# Patient Record
Sex: MALE | Race: WHITE | Employment: OTHER | URBAN - NONMETROPOLITAN AREA
[De-identification: names, ages, dates, MRNs, and addresses within clinical notes are randomized per-mention and may not be internally consistent; named-entity substitution may affect disease eponyms.]

---

## 2017-02-28 ENCOUNTER — OFFICE VISIT (OUTPATIENT)
Dept: PULMONOLOGY | Age: 82
End: 2017-02-28

## 2017-02-28 VITALS
BODY MASS INDEX: 25.04 KG/M2 | DIASTOLIC BLOOD PRESSURE: 70 MMHG | WEIGHT: 165.2 LBS | SYSTOLIC BLOOD PRESSURE: 118 MMHG | OXYGEN SATURATION: 97 % | HEIGHT: 68 IN | HEART RATE: 61 BPM

## 2017-02-28 DIAGNOSIS — R09.82 PND (POST-NASAL DRIP): ICD-10-CM

## 2017-02-28 DIAGNOSIS — R06.02 SOB (SHORTNESS OF BREATH): ICD-10-CM

## 2017-02-28 DIAGNOSIS — R06.3 CENTRAL SLEEP APNEA DUE TO CHEYNE-STOKES RESPIRATION: Primary | ICD-10-CM

## 2017-02-28 DIAGNOSIS — R05.9 COUGH: ICD-10-CM

## 2017-02-28 PROCEDURE — 1123F ACP DISCUSS/DSCN MKR DOCD: CPT | Performed by: PHYSICIAN ASSISTANT

## 2017-02-28 PROCEDURE — 4040F PNEUMOC VAC/ADMIN/RCVD: CPT | Performed by: PHYSICIAN ASSISTANT

## 2017-02-28 PROCEDURE — G8427 DOCREV CUR MEDS BY ELIG CLIN: HCPCS | Performed by: PHYSICIAN ASSISTANT

## 2017-02-28 PROCEDURE — 1036F TOBACCO NON-USER: CPT | Performed by: PHYSICIAN ASSISTANT

## 2017-02-28 PROCEDURE — G8484 FLU IMMUNIZE NO ADMIN: HCPCS | Performed by: PHYSICIAN ASSISTANT

## 2017-02-28 PROCEDURE — 99213 OFFICE O/P EST LOW 20 MIN: CPT | Performed by: PHYSICIAN ASSISTANT

## 2017-02-28 PROCEDURE — G8420 CALC BMI NORM PARAMETERS: HCPCS | Performed by: PHYSICIAN ASSISTANT

## 2017-02-28 ASSESSMENT — ENCOUNTER SYMPTOMS
NAUSEA: 0
SHORTNESS OF BREATH: 0
WHEEZING: 0
GASTROINTESTINAL NEGATIVE: 1
COUGH: 1
SPUTUM PRODUCTION: 1
HEARTBURN: 0
VOMITING: 0
BACK PAIN: 0
HEMOPTYSIS: 0
EYES NEGATIVE: 1
SORE THROAT: 0

## 2017-05-30 ENCOUNTER — OFFICE VISIT (OUTPATIENT)
Dept: PULMONOLOGY | Age: 82
End: 2017-05-30

## 2017-05-30 VITALS
HEIGHT: 68 IN | HEART RATE: 78 BPM | SYSTOLIC BLOOD PRESSURE: 130 MMHG | DIASTOLIC BLOOD PRESSURE: 88 MMHG | BODY MASS INDEX: 25.82 KG/M2 | OXYGEN SATURATION: 98 % | WEIGHT: 170.4 LBS

## 2017-05-30 DIAGNOSIS — R06.3 CENTRAL SLEEP APNEA DUE TO CHEYNE-STOKES RESPIRATION: Primary | ICD-10-CM

## 2017-05-30 DIAGNOSIS — R06.02 SOB (SHORTNESS OF BREATH): ICD-10-CM

## 2017-05-30 DIAGNOSIS — M41.9 KYPHOSCOLIOSIS: ICD-10-CM

## 2017-05-30 PROCEDURE — 4040F PNEUMOC VAC/ADMIN/RCVD: CPT | Performed by: PHYSICIAN ASSISTANT

## 2017-05-30 PROCEDURE — 1036F TOBACCO NON-USER: CPT | Performed by: PHYSICIAN ASSISTANT

## 2017-05-30 PROCEDURE — 1123F ACP DISCUSS/DSCN MKR DOCD: CPT | Performed by: PHYSICIAN ASSISTANT

## 2017-05-30 PROCEDURE — G8420 CALC BMI NORM PARAMETERS: HCPCS | Performed by: PHYSICIAN ASSISTANT

## 2017-05-30 PROCEDURE — 99214 OFFICE O/P EST MOD 30 MIN: CPT | Performed by: PHYSICIAN ASSISTANT

## 2017-05-30 PROCEDURE — G8427 DOCREV CUR MEDS BY ELIG CLIN: HCPCS | Performed by: PHYSICIAN ASSISTANT

## 2017-07-07 RX ORDER — FINASTERIDE 5 MG/1
TABLET, FILM COATED ORAL
Qty: 90 TABLET | Refills: 0 | Status: SHIPPED | OUTPATIENT
Start: 2017-07-07 | End: 2017-11-21 | Stop reason: ALTCHOICE

## 2017-10-03 RX ORDER — FINASTERIDE 5 MG/1
5 TABLET, FILM COATED ORAL DAILY
Qty: 90 TABLET | Refills: 3 | Status: SHIPPED | OUTPATIENT
Start: 2017-10-03 | End: 2017-11-21 | Stop reason: SDUPTHER

## 2017-10-03 NOTE — TELEPHONE ENCOUNTER
Minus Guillermina called requesting a refill on the following medications:  Requested Prescriptions     Pending Prescriptions Disp Refills    finasteride (PROSCAR) 5 MG tablet 90 tablet 3     Sig: Take 1 tablet by mouth daily     Pharmacy verified:  [email or web address]      Date of last visit: 11/17/16  Date of next visit (if applicable): 75/49/0682        Date of last fill and quantity (to be completed by clinical staff)  Pharmacy name: [facility] on [address]

## 2017-11-21 ENCOUNTER — OFFICE VISIT (OUTPATIENT)
Dept: UROLOGY | Age: 82
End: 2017-11-21
Payer: MEDICARE

## 2017-11-21 VITALS
BODY MASS INDEX: 25.31 KG/M2 | SYSTOLIC BLOOD PRESSURE: 100 MMHG | DIASTOLIC BLOOD PRESSURE: 60 MMHG | HEIGHT: 69 IN | WEIGHT: 170.9 LBS

## 2017-11-21 DIAGNOSIS — R35.1 NOCTURIA: ICD-10-CM

## 2017-11-21 DIAGNOSIS — R10.9 FLANK PAIN: ICD-10-CM

## 2017-11-21 DIAGNOSIS — R35.0 BENIGN PROSTATIC HYPERPLASIA WITH URINARY FREQUENCY: Primary | ICD-10-CM

## 2017-11-21 DIAGNOSIS — N40.1 BENIGN PROSTATIC HYPERPLASIA WITH URINARY FREQUENCY: Primary | ICD-10-CM

## 2017-11-21 LAB
BILIRUBIN URINE: NEGATIVE
BLOOD URINE, POC: NORMAL
CHARACTER, URINE: CLEAR
COLOR, URINE: YELLOW
GLUCOSE URINE: NEGATIVE MG/DL
KETONES, URINE: NORMAL
LEUKOCYTE CLUMPS, URINE: NEGATIVE
NITRITE, URINE: NEGATIVE
PH, URINE: 5.5
PROTEIN, URINE: NEGATIVE MG/DL
SPECIFIC GRAVITY, URINE: 1.01 (ref 1–1.03)
UROBILINOGEN, URINE: 1 EU/DL

## 2017-11-21 PROCEDURE — 99213 OFFICE O/P EST LOW 20 MIN: CPT | Performed by: UROLOGY

## 2017-11-21 PROCEDURE — G8484 FLU IMMUNIZE NO ADMIN: HCPCS | Performed by: UROLOGY

## 2017-11-21 PROCEDURE — 4040F PNEUMOC VAC/ADMIN/RCVD: CPT | Performed by: UROLOGY

## 2017-11-21 PROCEDURE — 1036F TOBACCO NON-USER: CPT | Performed by: UROLOGY

## 2017-11-21 PROCEDURE — G8428 CUR MEDS NOT DOCUMENT: HCPCS | Performed by: UROLOGY

## 2017-11-21 PROCEDURE — 1123F ACP DISCUSS/DSCN MKR DOCD: CPT | Performed by: UROLOGY

## 2017-11-21 PROCEDURE — G8417 CALC BMI ABV UP PARAM F/U: HCPCS | Performed by: UROLOGY

## 2017-11-21 RX ORDER — TAMSULOSIN HYDROCHLORIDE 0.4 MG/1
0.4 CAPSULE ORAL NIGHTLY
Qty: 90 CAPSULE | Refills: 3 | Status: SHIPPED | OUTPATIENT
Start: 2017-11-21 | End: 2018-11-06 | Stop reason: SDUPTHER

## 2017-11-21 RX ORDER — LIDOCAINE 50 MG/G
OINTMENT TOPICAL PRN
COMMUNITY
End: 2019-01-21 | Stop reason: SDUPTHER

## 2017-11-21 RX ORDER — POTASSIUM CHLORIDE 1.5 G/1.77G
20 POWDER, FOR SOLUTION ORAL DAILY
COMMUNITY

## 2017-11-21 RX ORDER — FUROSEMIDE 20 MG/1
40 TABLET ORAL DAILY
COMMUNITY

## 2017-11-21 RX ORDER — FINASTERIDE 5 MG/1
5 TABLET, FILM COATED ORAL DAILY
Qty: 90 TABLET | Refills: 3 | Status: SHIPPED | OUTPATIENT
Start: 2017-11-21 | End: 2018-10-15 | Stop reason: SDUPTHER

## 2017-11-22 ENCOUNTER — HOSPITAL ENCOUNTER (OUTPATIENT)
Dept: ULTRASOUND IMAGING | Age: 82
Discharge: HOME | End: 2017-11-22
Payer: MEDICARE

## 2017-11-22 ENCOUNTER — TELEPHONE (OUTPATIENT)
Dept: UROLOGY | Age: 82
End: 2017-11-22

## 2017-11-22 DIAGNOSIS — R10.9 FLANK PAIN: ICD-10-CM

## 2017-11-22 PROCEDURE — 76770 US EXAM ABDO BACK WALL COMP: CPT

## 2017-11-28 ENCOUNTER — OFFICE VISIT (OUTPATIENT)
Dept: PULMONOLOGY | Age: 82
End: 2017-11-28
Payer: MEDICARE

## 2017-11-28 VITALS
HEART RATE: 60 BPM | DIASTOLIC BLOOD PRESSURE: 64 MMHG | WEIGHT: 169.6 LBS | SYSTOLIC BLOOD PRESSURE: 122 MMHG | BODY MASS INDEX: 25.12 KG/M2 | HEIGHT: 69 IN | OXYGEN SATURATION: 94 %

## 2017-11-28 DIAGNOSIS — R05.3 CHRONIC COUGH: ICD-10-CM

## 2017-11-28 DIAGNOSIS — R06.3 CENTRAL SLEEP APNEA DUE TO CHEYNE-STOKES RESPIRATION: Primary | ICD-10-CM

## 2017-11-28 DIAGNOSIS — J42 CHRONIC BRONCHITIS, UNSPECIFIED CHRONIC BRONCHITIS TYPE (HCC): ICD-10-CM

## 2017-11-28 DIAGNOSIS — I27.20 PULMONARY HYPERTENSION (HCC): ICD-10-CM

## 2017-11-28 DIAGNOSIS — R09.82 PND (POST-NASAL DRIP): ICD-10-CM

## 2017-11-28 DIAGNOSIS — J98.4 RESTRICTIVE LUNG DISEASE DUE TO KYPHOSCOLIOSIS: ICD-10-CM

## 2017-11-28 DIAGNOSIS — Z98.890 H/O MITRAL VALVE REPAIR: ICD-10-CM

## 2017-11-28 DIAGNOSIS — M41.9 RESTRICTIVE LUNG DISEASE DUE TO KYPHOSCOLIOSIS: ICD-10-CM

## 2017-11-28 DIAGNOSIS — I48.20 CHRONIC ATRIAL FIBRILLATION (HCC): ICD-10-CM

## 2017-11-28 DIAGNOSIS — R06.09 DYSPNEA ON EXERTION: ICD-10-CM

## 2017-11-28 PROCEDURE — G8417 CALC BMI ABV UP PARAM F/U: HCPCS | Performed by: PHYSICIAN ASSISTANT

## 2017-11-28 PROCEDURE — 99215 OFFICE O/P EST HI 40 MIN: CPT | Performed by: PHYSICIAN ASSISTANT

## 2017-11-28 PROCEDURE — G8427 DOCREV CUR MEDS BY ELIG CLIN: HCPCS | Performed by: PHYSICIAN ASSISTANT

## 2017-11-28 PROCEDURE — 1036F TOBACCO NON-USER: CPT | Performed by: PHYSICIAN ASSISTANT

## 2017-11-28 PROCEDURE — 3023F SPIROM DOC REV: CPT | Performed by: PHYSICIAN ASSISTANT

## 2017-11-28 PROCEDURE — 1123F ACP DISCUSS/DSCN MKR DOCD: CPT | Performed by: PHYSICIAN ASSISTANT

## 2017-11-28 PROCEDURE — 4040F PNEUMOC VAC/ADMIN/RCVD: CPT | Performed by: PHYSICIAN ASSISTANT

## 2017-11-28 PROCEDURE — G8926 SPIRO NO PERF OR DOC: HCPCS | Performed by: PHYSICIAN ASSISTANT

## 2017-11-28 PROCEDURE — G8484 FLU IMMUNIZE NO ADMIN: HCPCS | Performed by: PHYSICIAN ASSISTANT

## 2017-11-28 RX ORDER — FLUTICASONE PROPIONATE 50 MCG
2 SPRAY, SUSPENSION (ML) NASAL DAILY
Qty: 1 BOTTLE | Refills: 6 | Status: SHIPPED | OUTPATIENT
Start: 2017-11-28 | End: 2019-03-12

## 2017-11-28 RX ORDER — ALBUTEROL SULFATE 90 UG/1
2 AEROSOL, METERED RESPIRATORY (INHALATION) EVERY 6 HOURS PRN
Qty: 1 INHALER | Refills: 11 | Status: SHIPPED | OUTPATIENT
Start: 2017-11-28 | End: 2020-07-14 | Stop reason: SDUPTHER

## 2017-11-28 NOTE — PROGRESS NOTES
[facility]      Naida Deras                                         [id number]  11/28/2017   Chief Complaint   Patient presents with    Sleep Apnea     6 month fu with download        Pt of Dr. Gokul Lara    PAP Download:   Original or initial  AHI: 22.3     Date of initial study: 8/12/13    [] Compliant  60%   [x]  Noncompliant 40 %       PAP Type - ASV  Max pressure- 25  Min EPAP 4   Max EPAP 15  Min PS 4  Max PS 15  Avg Hrs/Day 5:5:32  AHI: 7.7   Recorded compliance dates 10/29/17-11/27/17  Machine/Mfg: Respironics Interface: Full face mask    Provider:  [x]-HMAZALEA  []Randall  []Jerod  []Javan         [][facility] []Other:   Neck Size: 17.5in. Mallampati 4  ESS:  8      Presentation:   Mary Lou Peguero presents for sleep medicine follow up for central sleep apnea  Since the last visit, Mary Lou Peguero is doing reasonably well with their sleep machine. Other comments: he had stopped using it for several months and now using it again over the past few weeks. He is still having dyspnea with exertion. Following with cardiology for cardiac issues. Had LE edema but improved with diuretics. He is using albuterol as needed for dyspnea with some benefit -1-2 times a day. He is coughing- chronic and productive of yellow sputum. He complains of PND,    Equipment issues: The pressure is  acceptable, the mask is acceptable and he  is  using the humidity. Sleep issues:  Do you feel better? Yes  More rested? Yes   Better concentration? yes    Progress History:   Since last visit any new medical issues? Yes SOB  New ER or hospitlal visits? No  Any new or changes in medicines? No  Any new sleep medicines?  No        Past Medical History:   Diagnosis Date    Atrial fibrillation ([address]     CAD (coronary artery disease)     mitral valve repair only    Chronic kidney disease     Diabetes mellitus ([address]     Hypertension 8/20/2013    Mild acid reflux 8/20/2013    Pancreatitis, gallstone     Sleep apnea        Past Surgical History:   Procedure Laterality Date    CARDIAC SURGERY      mitral valve repair    CATARACT REMOVAL      CHOLECYSTECTOMY      COLONOSCOPY  01/12/2011,10/09/2015    ENDOSCOPY, COLON, DIAGNOSTIC      HERNIA REPAIR      PACEMAKER PLACEMENT  3/23/15    SKIN BIOPSY      negative    TONSILLECTOMY AND ADENOIDECTOMY         Social History   Substance Use Topics    Smoking status: Never Smoker    Smokeless tobacco: Never Used      Comment: never    Alcohol use 0.0 oz/week     1 Glasses of wine per week      Comment: very little. right now none. No Known Allergies    Current Outpatient Prescriptions   Medication Sig Dispense Refill    CPAP Machine MISC by Does not apply route Please change ASV to  Max pressure- 25  Min EPAP 5  Max EPAP 15  Min PS 4  Max PS 15 1 each 0    albuterol sulfate HFA (PROAIR HFA) 108 (90 Base) MCG/ACT inhaler Inhale 2 puffs into the lungs every 6 hours as needed for Wheezing 1 Inhaler 11    fluticasone (FLONASE) 50 MCG/ACT nasal spray 2 sprays by Nasal route daily 1 Bottle 6    Cetirizine HCl 10 MG CAPS Take 1 capsule by mouth daily 30 capsule 3    furosemide (LASIX) 20 MG tablet Take 20 mg by mouth 2 times daily      potassium chloride (KLOR-CON) 20 MEQ packet Take 20 mEq by mouth 2 times daily      Sacubitril-Valsartan (ENTRESTO PO) Take by mouth      Omega-3 Fatty Acids (FISH OIL CONCENTRATE PO) Take by mouth      lidocaine (XYLOCAINE) 5 % ointment Apply topically as needed for Pain Apply topically as needed.  hydrocortisone (WESTCORT) 0.2 % cream Apply topically 2 times daily Apply topically 2 times daily.       finasteride (PROSCAR) 5 MG tablet Take 1 tablet by mouth daily 90 tablet 3    tamsulosin (FLOMAX) 0.4 MG capsule Take 1 capsule by mouth nightly 90 capsule 3    Buprenorphine (BUTRANS TD) Place 7.5 mg onto the skin 7 days a week      amiodarone (CORDARONE) 200 MG tablet Take 100 mg by mouth daily      Calcium Citrate-Vitamin D (CALCIUM CITRATE +D PO) Take  by mouth daily. 630 mg - 500 IU      hydrocortisone 0.5 % cream Apply  topically daily as needed. Apply topically 2 times daily.  METFORMIN HCL ER, OSM, PO Take 500 mg by mouth 2 times daily.  Cholecalciferol (VITAMIN D3) 2000 UNITS CAPS Take  by mouth daily.  Carvedilol (COREG PO) Take 6.25 mg by mouth 2 times daily       aspirin 81 MG EC tablet Take 162 mg by mouth daily.  Warfarin Sodium (COUMADIN PO) Take by mouth Followed by Dr. Tess Franklin. 2.5 mg daily.  omeprazole (PRILOSEC) 20 MG capsule Take 20 mg by mouth daily.  multivitamin (THERAGRAN) per tablet Take 1 tablet by mouth daily.  Ascorbic Acid (VITAMIN C) 500 MG tablet Take 1,000 mg by mouth daily.  vitamin B-12 (CYANOCOBALAMIN) 100 MCG tablet Take 500 mcg by mouth daily. No current facility-administered medications for this visit. Family History   Problem Relation Age of Onset    Diabetes Mother     Cancer Father         Review of Systems -   General ROS: stable / unchanged  ENT ROS: +nasal congestion, oral lesions or sore throat  Hematological and Lymphatic ROS: negative  Endocrine ROS: negative  Respiratory ROS: +cough, shortness of breath,   Cardiovascular ROS: no chest pain   Gastrointestinal ROS: no abdominal pain, change in bowel habits, or black or bloody stools  Musculoskeletal ROS: negative  Neurological ROS: negative    Physical Exam:    BMI:  Body mass index is 25.05 kg/m².     Wt Readings from Last 3 Encounters:   11/28/17 169 lb 9.6 oz (76.9 kg)   11/21/17 170 lb 14.4 oz (77.5 kg)   05/30/17 170 lb 6.4 oz (77.3 kg)     Vitals: /64 (Site: Left Arm, Position: Sitting, Cuff Size: Large Adult)   Pulse 60   Ht 5' 9\" (1.753 m)   Wt 169 lb 9.6 oz (76.9 kg)   SpO2 94% Comment: RA at rest  BMI 25.05 kg/m²       General appearance: alert and oriented to person, place and time, well-developed and well-nourished, in no acute impression and plan. Patient verbalizes understanding.   We will see Luis Wong back in: 3 months for pulm and 6 months with download    José Arias PA-C, MPAS  11/28/2017

## 2018-03-02 ENCOUNTER — OFFICE VISIT (OUTPATIENT)
Dept: PULMONOLOGY | Age: 83
End: 2018-03-02
Payer: MEDICARE

## 2018-03-02 VITALS
BODY MASS INDEX: 25.86 KG/M2 | SYSTOLIC BLOOD PRESSURE: 126 MMHG | TEMPERATURE: 99.2 F | RESPIRATION RATE: 18 BRPM | HEART RATE: 60 BPM | DIASTOLIC BLOOD PRESSURE: 80 MMHG | OXYGEN SATURATION: 97 % | WEIGHT: 174.6 LBS | HEIGHT: 69 IN

## 2018-03-02 DIAGNOSIS — R05.3 CHRONIC COUGH: ICD-10-CM

## 2018-03-02 DIAGNOSIS — J98.4 RESTRICTIVE LUNG DISEASE DUE TO KYPHOSCOLIOSIS: Primary | ICD-10-CM

## 2018-03-02 DIAGNOSIS — M41.9 RESTRICTIVE LUNG DISEASE DUE TO KYPHOSCOLIOSIS: Primary | ICD-10-CM

## 2018-03-02 PROCEDURE — G8417 CALC BMI ABV UP PARAM F/U: HCPCS | Performed by: NURSE PRACTITIONER

## 2018-03-02 PROCEDURE — 99214 OFFICE O/P EST MOD 30 MIN: CPT | Performed by: NURSE PRACTITIONER

## 2018-03-02 PROCEDURE — 1123F ACP DISCUSS/DSCN MKR DOCD: CPT | Performed by: NURSE PRACTITIONER

## 2018-03-02 PROCEDURE — 4040F PNEUMOC VAC/ADMIN/RCVD: CPT | Performed by: NURSE PRACTITIONER

## 2018-03-02 PROCEDURE — G8484 FLU IMMUNIZE NO ADMIN: HCPCS | Performed by: NURSE PRACTITIONER

## 2018-03-02 PROCEDURE — G8427 DOCREV CUR MEDS BY ELIG CLIN: HCPCS | Performed by: NURSE PRACTITIONER

## 2018-03-02 PROCEDURE — 1036F TOBACCO NON-USER: CPT | Performed by: NURSE PRACTITIONER

## 2018-03-02 ASSESSMENT — ENCOUNTER SYMPTOMS
DIARRHEA: 0
DOUBLE VISION: 0
SHORTNESS OF BREATH: 1
COUGH: 0
BLURRED VISION: 0
NAUSEA: 0
HEMOPTYSIS: 0
SPUTUM PRODUCTION: 0
WHEEZING: 0
VOMITING: 0

## 2018-03-02 NOTE — PROGRESS NOTES
[facility]    Patient: Gladys Bermudez, 80 y.o.   : 1934  3/2/2018    Former Pt of Dr. Anabel James   Patient presents with    Shortness of Breath     3 mo pulm f/u with No tests  Has a cough- with expect  DNQ  N 17.5in M IV      Khurram Friedman is here for follow up after being treated for bronchitis at [facility] with Z-pack and prednisone burst 40mg x 5 days. Has followed with Dr. Pastor Burger and Nancy Fierro PA-C. History of kyphoscoliosis with restrictive defect noted on PFT. Patient previously instructed to follow-up PRN per Dr. Pastor Burger note. Shortness of Breath   This is a chronic problem. The current episode started more than 1 year ago. The problem occurs daily. The problem has been unchanged. Pertinent negatives include no chest pain, fever, hemoptysis, sputum production, vomiting or wheezing. Patient admits to having no regular cardiovascular exercise. Reports going to grocery store 2 times per week, otherwise remains at home caring for his wife. Uses albuterol inhaler but has noticed no improvement. Has no SOB while at rest and breathing improves quickly after stopping and resting. Patient also complains of chronic cough diagnosed with chronic bronchitis in the past. Patient reports on an average day 1 episode of coughing in the morning. When asked to further describe patient reports that he was concerned be cause it has no gone away, no other complaints. Progress History:   Since last visit any new medical issues? Yes bronchitis [facility] ED treated with Z-pack and prednisone 40 mg daily. New ER or hospital visits? Yes see above  Any new or changes in medicines? No  Using inhalers? Yes cpl times per day  Are they helpful? No  Flu vaccine? Yes  Pneumonia vaccine?  Yes in the past 5 years  Past Medical hx   PMH:  Past Medical History:   Diagnosis Date    Atrial fibrillation ([facility] [address]     CAD (coronary artery disease)     mitral valve repair

## 2018-06-05 ENCOUNTER — OFFICE VISIT (OUTPATIENT)
Dept: PULMONOLOGY | Age: 83
End: 2018-06-05
Payer: MEDICARE

## 2018-06-05 VITALS
SYSTOLIC BLOOD PRESSURE: 104 MMHG | WEIGHT: 174 LBS | HEIGHT: 69 IN | OXYGEN SATURATION: 97 % | HEART RATE: 65 BPM | DIASTOLIC BLOOD PRESSURE: 66 MMHG | BODY MASS INDEX: 25.77 KG/M2

## 2018-06-05 DIAGNOSIS — I48.20 CHRONIC ATRIAL FIBRILLATION (HCC): ICD-10-CM

## 2018-06-05 DIAGNOSIS — R06.02 SOB (SHORTNESS OF BREATH): ICD-10-CM

## 2018-06-05 DIAGNOSIS — Z98.890 H/O MITRAL VALVE REPAIR: ICD-10-CM

## 2018-06-05 DIAGNOSIS — R06.3 CENTRAL SLEEP APNEA DUE TO CHEYNE-STOKES RESPIRATION: Primary | ICD-10-CM

## 2018-06-05 DIAGNOSIS — R05.3 CHRONIC COUGH: ICD-10-CM

## 2018-06-05 DIAGNOSIS — J42 CHRONIC BRONCHITIS, UNSPECIFIED CHRONIC BRONCHITIS TYPE (HCC): ICD-10-CM

## 2018-06-05 DIAGNOSIS — R09.82 PND (POST-NASAL DRIP): ICD-10-CM

## 2018-06-05 PROCEDURE — G8926 SPIRO NO PERF OR DOC: HCPCS | Performed by: PHYSICIAN ASSISTANT

## 2018-06-05 PROCEDURE — 1123F ACP DISCUSS/DSCN MKR DOCD: CPT | Performed by: PHYSICIAN ASSISTANT

## 2018-06-05 PROCEDURE — 4040F PNEUMOC VAC/ADMIN/RCVD: CPT | Performed by: PHYSICIAN ASSISTANT

## 2018-06-05 PROCEDURE — 99215 OFFICE O/P EST HI 40 MIN: CPT | Performed by: PHYSICIAN ASSISTANT

## 2018-06-05 PROCEDURE — G8427 DOCREV CUR MEDS BY ELIG CLIN: HCPCS | Performed by: PHYSICIAN ASSISTANT

## 2018-06-05 PROCEDURE — 3023F SPIROM DOC REV: CPT | Performed by: PHYSICIAN ASSISTANT

## 2018-06-05 PROCEDURE — G8417 CALC BMI ABV UP PARAM F/U: HCPCS | Performed by: PHYSICIAN ASSISTANT

## 2018-06-05 PROCEDURE — 1036F TOBACCO NON-USER: CPT | Performed by: PHYSICIAN ASSISTANT

## 2018-06-05 RX ORDER — IPRATROPIUM BROMIDE 42 UG/1
2 SPRAY, METERED NASAL 3 TIMES DAILY
Qty: 1 BOTTLE | Refills: 3 | Status: SHIPPED | OUTPATIENT
Start: 2018-06-05 | End: 2018-08-08 | Stop reason: ALTCHOICE

## 2018-06-05 ASSESSMENT — ENCOUNTER SYMPTOMS
EYES NEGATIVE: 1
SHORTNESS OF BREATH: 1
COUGH: 1
GASTROINTESTINAL NEGATIVE: 1
SORE THROAT: 0
SINUS PAIN: 0
NAUSEA: 0
HEARTBURN: 0
SPUTUM PRODUCTION: 0
WHEEZING: 0
ORTHOPNEA: 0

## 2018-08-08 ENCOUNTER — TELEPHONE (OUTPATIENT)
Dept: PHYSICAL MEDICINE AND REHAB | Age: 83
End: 2018-08-08

## 2018-08-08 ENCOUNTER — OFFICE VISIT (OUTPATIENT)
Dept: PHYSICAL MEDICINE AND REHAB | Age: 83
End: 2018-08-08
Payer: MEDICARE

## 2018-08-08 VITALS
HEART RATE: 61 BPM | HEIGHT: 67 IN | SYSTOLIC BLOOD PRESSURE: 94 MMHG | BODY MASS INDEX: 27.25 KG/M2 | DIASTOLIC BLOOD PRESSURE: 60 MMHG | WEIGHT: 173.6 LBS

## 2018-08-08 DIAGNOSIS — M47.816 SPONDYLOSIS OF LUMBAR REGION WITHOUT MYELOPATHY OR RADICULOPATHY: ICD-10-CM

## 2018-08-08 DIAGNOSIS — G89.4 CHRONIC PAIN SYNDROME: ICD-10-CM

## 2018-08-08 DIAGNOSIS — M53.3 SACROILIAC JOINT DYSFUNCTION: ICD-10-CM

## 2018-08-08 DIAGNOSIS — M48.061 SPINAL STENOSIS OF LUMBAR REGION WITHOUT NEUROGENIC CLAUDICATION: Primary | ICD-10-CM

## 2018-08-08 PROCEDURE — 1123F ACP DISCUSS/DSCN MKR DOCD: CPT | Performed by: NURSE PRACTITIONER

## 2018-08-08 PROCEDURE — G8417 CALC BMI ABV UP PARAM F/U: HCPCS | Performed by: NURSE PRACTITIONER

## 2018-08-08 PROCEDURE — 99215 OFFICE O/P EST HI 40 MIN: CPT | Performed by: NURSE PRACTITIONER

## 2018-08-08 PROCEDURE — 4040F PNEUMOC VAC/ADMIN/RCVD: CPT | Performed by: NURSE PRACTITIONER

## 2018-08-08 PROCEDURE — G8427 DOCREV CUR MEDS BY ELIG CLIN: HCPCS | Performed by: NURSE PRACTITIONER

## 2018-08-08 PROCEDURE — 1036F TOBACCO NON-USER: CPT | Performed by: NURSE PRACTITIONER

## 2018-08-08 PROCEDURE — 1101F PT FALLS ASSESS-DOCD LE1/YR: CPT | Performed by: NURSE PRACTITIONER

## 2018-08-08 RX ORDER — TRIAMCINOLONE ACETONIDE 0.25 MG/ML
LOTION TOPICAL PRN
COMMUNITY

## 2018-08-08 RX ORDER — TRAMADOL HYDROCHLORIDE 50 MG/1
50 TABLET ORAL 2 TIMES DAILY PRN
Qty: 30 TABLET | Refills: 0 | Status: SHIPPED | OUTPATIENT
Start: 2018-08-08 | End: 2018-08-23

## 2018-08-08 ASSESSMENT — ENCOUNTER SYMPTOMS
DIARRHEA: 0
PHOTOPHOBIA: 0
RHINORRHEA: 0
BACK PAIN: 1
COLOR CHANGE: 0
COUGH: 0
NAUSEA: 0
CHEST TIGHTNESS: 0
SINUS PRESSURE: 0
SORE THROAT: 0
EYE PAIN: 0
CONSTIPATION: 0
SHORTNESS OF BREATH: 0
WHEEZING: 0
VOMITING: 0
ABDOMINAL PAIN: 0

## 2018-08-08 NOTE — PROGRESS NOTES
[facility]  [address]  Dept: [phone]  Dept Fax: [phone]  Loc: [phone]    Visit Date: 8/8/2018    Lan Valencia is a 80 y.o. male who is referred for pain management evaluation and treatment per Dr. Wayne Delgado. CAGE and [facility] Questions   1. In the last three months, have you felt you should cut down or stop drinking or using drugs? Yes []        No [x]     2. In the last three months, has anyone annoyed you or gotten on your nerves by telling you to cut down or stop drinking or using drugs? Yes []        No [x]     3. In the last three months, have you felt guilty or bad about how much you drink or use drugs? Yes []        No [x]     4. In the last three months, have you been waking up wanting to have an alcoholic drink or use drugs? Yes []        No [x]        Opioid Risk Tool:  Clinician Form       1. Family History of Substance Abuse: Female Male    Alcohol   []1   []3    Illegal drugs   []2   []3    Prescription drugs     []4   []4   2. Personal History of Substance Abuse:          Alcohol   []3   []3    Illegal drugs   []4   []4    Prescription drugs     []5   []5   3. Age (corinne box if between 12 and 39):     []1   []1   4. History of Preadolescent Sexual Abuse:     []3   []0   5. Psychological Disease:      Attention deficit disorder, obsessive-compulsive disorder, bipolar, schizophrenia   []2   []2      Depression     []1   []1    Scoring Totals  0     Total Score  Low Risk  Moderate Risk  High Risk   Risk Category   0 - 3   4 - 7   8 or Above      Patient states symptoms interfere with:  A.  General Activity:  yes   B. Mood: yes    C. Walking Ability:   yes   D. Normal Work (Includes both work outside the home and housework):   yes    E.  Relations with Other People:  no   F. Sleep:   yes   G.  Enjoyment of Life:  yes       HPI:     Chief Complaint: Low back pain    HPI   He is here today with cane  New patient here today with c/o low back pain for the last 10 years. He has went to Dr Nicky Daigle and Dr Miller Better no surgery recommended. He also has severe scoliosis. He has went to [facility] pain clinic. He has had SI MBB with 3-4 weeks of relief in 2015 one note says he got relief the other note states no relief with different Dr.he has had LESI 2015 and 2016 with great relief with Dr Eugenio Pacheco, he has had Lumbar RFA Right side L5-S1  2015 with 1 year relief of 100-50 % relief over the 1 year. He has had TFLESI Right  2017 no relief and couldn't walk for 2 days. He has been on Butrans patch and Tramadol for a few years. He denies any prior back surgeries. He denies any trauma falls or MVA with back injuries. He worked at [employer] as a manager. Describes the low back pain as constant aching right sided mostly increases to sharp pains with walking or lifting over 5 lbs. He has to sit or lay down to relieve some of the pain. The majority of his pain is his low back but radiates into right leg over the last 3-4 weeks down to ankle. The low back pain will radiates into right SI buttock hip and thigh area mostly. Rates pain at worse 9/10, best 2/10, average 6/10. Treatments tried  PT, traction TENS UNIT, Chiropractor, muscle relaxer, narcotics, NSAIDS in past but unable to take now due to being on Coumadin, tylenol, Lidocaine,. He denies h/o cancer or long term steroid use  He denies any bowel dysfunction he does see a urologist for urinary issues. H/O TURP. Lumbar CT 2018  Multilevel DDD,  Mild to moderate spinal stenosis with foraminal stenosis at multiple levels, facet arthropathy L3 down to S1. The patient has No Known Allergies. Past Medical History  Sandra Keene  has a past medical history of Atrial fibrillation ([facility] [address] Bacterial pneumonia; CAD (coronary artery disease);  Chronic kidney disease; Collar bone fracture; Diabetes mellitus ([address] Rfl: 3    tamsulosin (FLOMAX) 0.4 MG capsule, Take 1 capsule by mouth nightly, Disp: 90 capsule, Rfl: 3    Buprenorphine (BUTRANS TD), Place 7.5 mg onto the skin 7 days a week, Disp: , Rfl:     amiodarone (CORDARONE) 200 MG tablet, Take 100 mg by mouth daily, Disp: , Rfl:     Calcium Citrate-Vitamin D (CALCIUM CITRATE +D PO), Take  by mouth daily. 630 mg - 500 IU, Disp: , Rfl:     METFORMIN HCL ER, OSM, PO, Take 500 mg by mouth 2 times daily. , Disp: , Rfl:     Cholecalciferol (VITAMIN D3) 2000 UNITS CAPS, Take  by mouth daily. , Disp: , Rfl:     Carvedilol (COREG PO), Take 6.25 mg by mouth 2 times daily , Disp: , Rfl:     aspirin 81 MG EC tablet, Take 81 mg by mouth daily , Disp: , Rfl:     Warfarin Sodium (COUMADIN PO), Take by mouth Followed by Dr. Olivia Poole. 2.5 mg daily. , Disp: , Rfl:     omeprazole (PRILOSEC) 20 MG capsule, Take 20 mg by mouth daily. , Disp: , Rfl:     multivitamin (THERAGRAN) per tablet, Take 1 tablet by mouth daily. , Disp: , Rfl:     Ascorbic Acid (VITAMIN C) 500 MG tablet, Take 1,000 mg by mouth daily. , Disp: , Rfl:     vitamin B-12 (CYANOCOBALAMIN) 100 MCG tablet, Take 500 mcg by mouth daily. , Disp: , Rfl:     CPAP Machine MISC, by Does not apply route Please change ASV to Max pressure- 25 Min EPAP 5 Max EPAP 15 Min PS 4 Max PS 15, Disp: 1 each, Rfl: 0    hydrocortisone 0.5 % cream, Apply  topically daily as needed. Apply topically 2 times daily. , Disp: , Rfl:     Subjective:      Review of Systems   Constitutional: Positive for activity change. Negative for appetite change, chills, diaphoresis, fatigue, fever and unexpected weight change. HENT: Negative for congestion, ear pain, hearing loss, mouth sores, nosebleeds, rhinorrhea, sinus pressure and sore throat. Eyes: Negative for photophobia, pain and visual disturbance. Respiratory: Negative for cough, chest tightness, shortness of breath and wheezing. Cardiovascular: Negative for chest pain and palpitations. Cardiovascular: Normal rate, regular rhythm, normal heart sounds and intact distal pulses. Exam reveals no gallop and no friction rub. No murmur heard. Pulmonary/Chest: Effort normal and breath sounds normal. No respiratory distress. He has no wheezes. He has no rales. He exhibits no tenderness. Wears CPAP at hs   Abdominal: Soft. Bowel sounds are normal. He exhibits no distension. There is no tenderness. There is no rebound and no guarding. Musculoskeletal: He exhibits tenderness. Right shoulder: Normal.        Left shoulder: Normal.        Right wrist: Normal.        Right hip: He exhibits decreased range of motion, decreased strength, tenderness and bony tenderness. Left hip: He exhibits tenderness. Right knee: He exhibits decreased range of motion and swelling. Tenderness found. Left knee: Tenderness found. Right ankle: Tenderness. Left ankle: Normal.        Cervical back: Normal.        Thoracic back: He exhibits tenderness. Lumbar back: He exhibits tenderness, pain and spasm. Back:         Right hand: Normal.        Left hand: Normal.        Right upper leg: He exhibits tenderness. Left upper leg: Normal.        Right lower leg: He exhibits tenderness. Left lower leg: Normal.        Right foot: There is tenderness. Left foot: Normal.   Neurological: He is alert and oriented to person, place, and time. He has normal strength and normal reflexes. He is not disoriented. He displays no atrophy. No cranial nerve deficit or sensory deficit. He exhibits normal muscle tone. He displays a negative Romberg sign. Coordination and gait normal. He displays no Babinski's sign on the right side. Motor 4/5 RLE   SLR + RLE    Skin: Skin is warm. No rash noted. He is not diaphoretic. No erythema. No pallor. Psychiatric: He has a normal mood and affect.  His speech is normal and behavior is normal. Judgment and thought content normal. His possible SCS trial  · Discussed possible EMG  Previous Treatments tried:  · PT: Yes,  any benefit? No, how many weeks? 6, last date done: 9649-3286  · NSAIDs: Yes,  any benefit? No,  how long taken: months to years unable to take now due to Coumadin  · Chiropractic: Yes,  any benefit? No  · Muscle relaxants: Yes,  any benefit? No  · Narcotics: Yes,  any benefit? Yes  · Spine surgeon consult: Yes Dr Chance Blum  · Any Implants: Yes mesh form hernia surgery    Meds. Prescribed:   Orders Placed This Encounter   Medications    traMADol (ULTRAM) 50 MG tablet     Sig: Take 1 tablet by mouth 2 times daily as needed for Pain for up to 15 days. .     Dispense:  30 tablet     Refill:  0       Return for [email or web address] or L4 right, Follow up after procedure, Follow up pain medications, Follow up UDS. Time spent with patient was 60 minutes more than 50% was spent  Counseling/coordinated the patient's care.     Electronically signed by MEGHANN George CNP on 8/8/2018 at 10:19 AM

## 2018-08-21 DIAGNOSIS — G89.4 CHRONIC PAIN SYNDROME: Primary | ICD-10-CM

## 2018-08-21 RX ORDER — BUPRENORPHINE 7.5 UG/H
1 PATCH TRANSDERMAL WEEKLY
Qty: 4 PATCH | Refills: 0 | Status: SHIPPED | OUTPATIENT
Start: 2018-08-27 | End: 2018-09-24 | Stop reason: SDUPTHER

## 2018-09-05 DIAGNOSIS — G89.4 CHRONIC PAIN SYNDROME: Primary | ICD-10-CM

## 2018-09-06 RX ORDER — TRAMADOL HYDROCHLORIDE 50 MG/1
50 TABLET ORAL 2 TIMES DAILY PRN
Qty: 60 TABLET | Refills: 0 | Status: SHIPPED | OUTPATIENT
Start: 2018-09-06 | End: 2018-10-06

## 2018-09-24 DIAGNOSIS — G89.4 CHRONIC PAIN SYNDROME: ICD-10-CM

## 2018-09-25 RX ORDER — BUPRENORPHINE 7.5 UG/H
1 PATCH TRANSDERMAL WEEKLY
Qty: 4 PATCH | Refills: 0 | Status: SHIPPED | OUTPATIENT
Start: 2018-09-25 | End: 2018-10-23 | Stop reason: SDUPTHER

## 2018-09-26 ENCOUNTER — OFFICE VISIT (OUTPATIENT)
Dept: PHYSICAL MEDICINE AND REHAB | Age: 83
End: 2018-09-26
Payer: MEDICARE

## 2018-09-26 VITALS
SYSTOLIC BLOOD PRESSURE: 112 MMHG | WEIGHT: 173 LBS | HEART RATE: 60 BPM | HEIGHT: 67 IN | BODY MASS INDEX: 27.15 KG/M2 | DIASTOLIC BLOOD PRESSURE: 66 MMHG

## 2018-09-26 DIAGNOSIS — M48.061 SPINAL STENOSIS OF LUMBAR REGION WITHOUT NEUROGENIC CLAUDICATION: Primary | ICD-10-CM

## 2018-09-26 DIAGNOSIS — M47.816 SPONDYLOSIS OF LUMBAR REGION WITHOUT MYELOPATHY OR RADICULOPATHY: ICD-10-CM

## 2018-09-26 DIAGNOSIS — M54.17 LUMBOSACRAL RADICULITIS: ICD-10-CM

## 2018-09-26 DIAGNOSIS — G89.4 CHRONIC PAIN SYNDROME: ICD-10-CM

## 2018-09-26 DIAGNOSIS — M53.3 SACROILIAC JOINT DYSFUNCTION: ICD-10-CM

## 2018-09-26 PROCEDURE — 4040F PNEUMOC VAC/ADMIN/RCVD: CPT | Performed by: NURSE PRACTITIONER

## 2018-09-26 PROCEDURE — G8417 CALC BMI ABV UP PARAM F/U: HCPCS | Performed by: NURSE PRACTITIONER

## 2018-09-26 PROCEDURE — 1036F TOBACCO NON-USER: CPT | Performed by: NURSE PRACTITIONER

## 2018-09-26 PROCEDURE — 1123F ACP DISCUSS/DSCN MKR DOCD: CPT | Performed by: NURSE PRACTITIONER

## 2018-09-26 PROCEDURE — G8427 DOCREV CUR MEDS BY ELIG CLIN: HCPCS | Performed by: NURSE PRACTITIONER

## 2018-09-26 PROCEDURE — 1101F PT FALLS ASSESS-DOCD LE1/YR: CPT | Performed by: NURSE PRACTITIONER

## 2018-09-26 PROCEDURE — 99213 OFFICE O/P EST LOW 20 MIN: CPT | Performed by: NURSE PRACTITIONER

## 2018-09-26 ASSESSMENT — ENCOUNTER SYMPTOMS
SHORTNESS OF BREATH: 0
CONSTIPATION: 0
BACK PAIN: 1
DIARRHEA: 0
COLOR CHANGE: 0
EYE PAIN: 0
ABDOMINAL PAIN: 0
SORE THROAT: 0
VOMITING: 0
CHEST TIGHTNESS: 0
PHOTOPHOBIA: 0
COUGH: 0
RHINORRHEA: 0
WHEEZING: 0
NAUSEA: 0
SINUS PRESSURE: 0

## 2018-09-26 NOTE — PROGRESS NOTES
[address] [facility] [place]  [address]  Dept: [phone]  Dept Fax: [phone]  Loc: [phone]    Visit Date: 9/26/2018    Functionality Assessment/Goals Worksheet     On a scale of 0 (Does not Interfere) to 10 (Completely Interferes)     1. Which number describes how during the past week pain has interfered with       the following:  A. General Activity:  8  B. Mood: 8  C. Walking Ability:  8  D. Normal Work (Includes both work outside the home and housework):  8  E. Relations with Other People:   8  F. Sleep:   8  G. Enjoyment of Life:   8    2. Patient Prefers to Take their Pain Medications:     [x]  On a regular basis   []  Only when necessary    []  Does not take pain medications    3. What are the Patient's Goals/Expectations for Visiting Pain Management? []  Learn about my pain    []  Receive Medication   []  Physical Therapy     []  Treat Depression   [x]  Receive Injections    []  Treat Sleep   []  Deal with Anxiety and Stress   []  Treat Opoid Dependence/Addiction   []  Other:    HPI:   Mauro Munguia is a 80 y.o. male is here today for    Chief Complaint: Low back pain  SI pain    HPI   F/U was scheduled to have LESI L3 or L4 but had to cancel due to recent hospitalization for Pancreatitis. He is to reschedule. He continues to have low back pain leg pain and SI pain. He continues to take Butrans patch and Tramadol BID prn. He has been havng right side mostly but bilateral leg and foot numbness tingling burning. Medications reviewed. Patient denies  side effects with medications. Patient states he is  taking medications as prescribed. He denies receiving pain medications from other sources. He complains of any ER visits since last visit. Pancreaitis  Pain scale with out pain medications is 7/10. Pain scale with pain medications is  3/10.   Last dose of Tramadol was today  Drug Rfl: 6    furosemide (LASIX) 20 MG tablet, Take 20 mg by mouth 2 times daily, Disp: , Rfl:     potassium chloride (KLOR-CON) 20 MEQ packet, Take 20 mEq by mouth daily , Disp: , Rfl:     Sacubitril-Valsartan (ENTRESTO PO), Take by mouth, Disp: , Rfl:     Omega-3 Fatty Acids (FISH OIL CONCENTRATE PO), Take by mouth, Disp: , Rfl:     lidocaine (XYLOCAINE) 5 % ointment, Apply topically as needed for Pain Apply topically as needed. , Disp: , Rfl:     hydrocortisone (WESTCORT) 0.2 % cream, Apply topically 2 times daily Apply topically 2 times daily. , Disp: , Rfl:     finasteride (PROSCAR) 5 MG tablet, Take 1 tablet by mouth daily, Disp: 90 tablet, Rfl: 3    tamsulosin (FLOMAX) 0.4 MG capsule, Take 1 capsule by mouth nightly, Disp: 90 capsule, Rfl: 3    amiodarone (CORDARONE) 200 MG tablet, Take 100 mg by mouth daily, Disp: , Rfl:     Calcium Citrate-Vitamin D (CALCIUM CITRATE +D PO), Take  by mouth daily. 630 mg - 500 IU, Disp: , Rfl:     hydrocortisone 0.5 % cream, Apply  topically daily as needed. Apply topically 2 times daily. , Disp: , Rfl:     METFORMIN HCL ER, OSM, PO, Take 500 mg by mouth 2 times daily. , Disp: , Rfl:     Cholecalciferol (VITAMIN D3) 2000 UNITS CAPS, Take  by mouth daily. , Disp: , Rfl:     Carvedilol (COREG PO), Take 6.25 mg by mouth 2 times daily , Disp: , Rfl:     aspirin 81 MG EC tablet, Take 81 mg by mouth daily , Disp: , Rfl:     Warfarin Sodium (COUMADIN PO), Take by mouth Followed by Dr. Olivia Poole. 2.5 mg daily. , Disp: , Rfl:     omeprazole (PRILOSEC) 20 MG capsule, Take 20 mg by mouth daily. , Disp: , Rfl:     multivitamin (THERAGRAN) per tablet, Take 1 tablet by mouth daily. , Disp: , Rfl:     Ascorbic Acid (VITAMIN C) 500 MG tablet, Take 1,000 mg by mouth daily. , Disp: , Rfl:     vitamin B-12 (CYANOCOBALAMIN) 100 MCG tablet, Take 500 mcg by mouth daily. , Disp: , Rfl:     Subjective:      Review of Systems   Constitutional: Positive for activity change.  Negative for

## 2018-10-15 RX ORDER — FINASTERIDE 5 MG/1
5 TABLET, FILM COATED ORAL DAILY
Qty: 90 TABLET | Refills: 3 | Status: SHIPPED | OUTPATIENT
Start: 2018-10-15 | End: 2018-11-06 | Stop reason: SDUPTHER

## 2018-10-15 NOTE — TELEPHONE ENCOUNTER
Stevan Del Rio called requesting a refill on the following medications:  Requested Prescriptions     Pending Prescriptions Disp Refills    finasteride (PROSCAR) 5 MG tablet 90 tablet 3     Sig: Take 1 tablet by mouth daily     Pharmacy verified:  [email or web address]      Date of last visit: 11/21/2017  Date of next visit (if applicable): 25/2/6802    **Pt. Has been out of medication for a week.

## 2018-10-18 ENCOUNTER — OFFICE VISIT (OUTPATIENT)
Dept: PULMONOLOGY | Age: 83
End: 2018-10-18
Payer: MEDICARE

## 2018-10-18 VITALS
HEIGHT: 67 IN | DIASTOLIC BLOOD PRESSURE: 68 MMHG | TEMPERATURE: 97.4 F | OXYGEN SATURATION: 96 % | WEIGHT: 173 LBS | BODY MASS INDEX: 27.15 KG/M2 | HEART RATE: 60 BPM | SYSTOLIC BLOOD PRESSURE: 114 MMHG

## 2018-10-18 DIAGNOSIS — R06.09 DYSPNEA ON EXERTION: ICD-10-CM

## 2018-10-18 DIAGNOSIS — J42 CHRONIC BRONCHITIS, UNSPECIFIED CHRONIC BRONCHITIS TYPE (HCC): ICD-10-CM

## 2018-10-18 DIAGNOSIS — I48.20 CHRONIC ATRIAL FIBRILLATION (HCC): ICD-10-CM

## 2018-10-18 DIAGNOSIS — M41.9 RESTRICTIVE LUNG DISEASE DUE TO KYPHOSCOLIOSIS: Primary | ICD-10-CM

## 2018-10-18 DIAGNOSIS — J98.4 RESTRICTIVE LUNG DISEASE DUE TO KYPHOSCOLIOSIS: Primary | ICD-10-CM

## 2018-10-18 PROCEDURE — 3023F SPIROM DOC REV: CPT | Performed by: NURSE PRACTITIONER

## 2018-10-18 PROCEDURE — 1101F PT FALLS ASSESS-DOCD LE1/YR: CPT | Performed by: NURSE PRACTITIONER

## 2018-10-18 PROCEDURE — G8926 SPIRO NO PERF OR DOC: HCPCS | Performed by: NURSE PRACTITIONER

## 2018-10-18 PROCEDURE — G8417 CALC BMI ABV UP PARAM F/U: HCPCS | Performed by: NURSE PRACTITIONER

## 2018-10-18 PROCEDURE — G8427 DOCREV CUR MEDS BY ELIG CLIN: HCPCS | Performed by: NURSE PRACTITIONER

## 2018-10-18 PROCEDURE — 1123F ACP DISCUSS/DSCN MKR DOCD: CPT | Performed by: NURSE PRACTITIONER

## 2018-10-18 PROCEDURE — 99214 OFFICE O/P EST MOD 30 MIN: CPT | Performed by: NURSE PRACTITIONER

## 2018-10-18 PROCEDURE — 1036F TOBACCO NON-USER: CPT | Performed by: NURSE PRACTITIONER

## 2018-10-18 PROCEDURE — 4040F PNEUMOC VAC/ADMIN/RCVD: CPT | Performed by: NURSE PRACTITIONER

## 2018-10-18 PROCEDURE — G8484 FLU IMMUNIZE NO ADMIN: HCPCS | Performed by: NURSE PRACTITIONER

## 2018-10-18 ASSESSMENT — ENCOUNTER SYMPTOMS
SINUS PRESSURE: 0
SINUS PAIN: 0
COUGH: 1
WHEEZING: 0
SHORTNESS OF BREATH: 1
RHINORRHEA: 0
CHEST TIGHTNESS: 0
BACK PAIN: 0
STRIDOR: 0
DIARRHEA: 0
NAUSEA: 0
EYES NEGATIVE: 1

## 2018-10-18 NOTE — PROGRESS NOTES
daily as needed. Apply topically 2 times daily.  METFORMIN HCL ER, OSM, PO Take 500 mg by mouth 2 times daily.  Cholecalciferol (VITAMIN D3) 2000 UNITS CAPS Take  by mouth daily.  Carvedilol (COREG PO) Take 6.25 mg by mouth 2 times daily       aspirin 81 MG EC tablet Take 81 mg by mouth daily       Warfarin Sodium (COUMADIN PO) Take by mouth Followed by Dr. Chaim Youssef. 2.5 mg daily.  omeprazole (PRILOSEC) 20 MG capsule Take 20 mg by mouth daily.  multivitamin (THERAGRAN) per tablet Take 1 tablet by mouth daily.  Ascorbic Acid (VITAMIN C) 500 MG tablet Take 1,000 mg by mouth daily.  vitamin B-12 (CYANOCOBALAMIN) 100 MCG tablet Take 500 mcg by mouth daily. No current facility-administered medications for this visit. Leesa MONTGOMERY   Review of Systems   Constitutional: Negative for activity change, appetite change, chills, fatigue, fever and unexpected weight change. HENT: Negative for congestion, postnasal drip, rhinorrhea, sinus pain and sinus pressure. Eyes: Negative. Respiratory: Positive for cough (morning cough with yellow mucous ) and shortness of breath. Negative for chest tightness, wheezing and stridor. Cardiovascular: Negative for chest pain and leg swelling. Gastrointestinal: Negative for diarrhea and nausea. Endocrine: Negative. Genitourinary: Negative. Musculoskeletal: Negative. Negative for arthralgias and back pain. Skin: Negative. Allergic/Immunologic: Positive for environmental allergies. Neurological: Negative. Negative for dizziness, seizures and light-headedness. Hematological: Bruises/bleeds easily. Psychiatric/Behavioral: Negative. Negative for decreased concentration and sleep disturbance. All other systems reviewed and are negative.        Physical exam   /68   Pulse 60   Temp 97.4 °F (36.3 °C)   Ht 5' 7\" (1.702 m)   Wt 173 lb (78.5 kg)   SpO2 96%   BMI 27.10 kg/m²        Physical Exam   Constitutional:

## 2018-10-19 ENCOUNTER — SURG/PROC ORDERS (OUTPATIENT)
Dept: PHYSICAL MEDICINE AND REHAB | Age: 83
End: 2018-10-19

## 2018-10-19 NOTE — H&P
Gamal Leger is an 80 y.o.  male. Chief Complaint: Low back pain            The patient has No Known Allergies. Past Medical History  Stann Mcardle  has a past medical history of Atrial fibrillation ([facility] [address] Bacterial pneumonia; CAD (coronary artery disease); Chronic kidney disease; Collar bone fracture; Diabetes mellitus ([facility] [address] Enlarged prostate; Hypertension; Mild acid reflux; Pancreatitis, gallstone; Sleep apnea; and UTI (urinary tract infection). Past Surgical History  The patient  has a past surgical history that includes hernia repair; Tonsillectomy and adenoidectomy; Cholecystectomy; Endoscopy, colon, diagnostic; Colonoscopy (01/12/2011,10/09/2015); Cardiac surgery; skin biopsy; pacemaker placement (3/23/15); Cataract removal; TURP (01/04/2010); and hernia repair (Right, 03/12/2010). Family History  This patient's family history includes Cancer in his father; Diabetes in his mother. Social History  Stann Mcardle  reports that he has never smoked. He has never used smokeless tobacco. He reports that he drinks alcohol. He reports that he does not use drugs. Medications    Current Medication      Current Outpatient Prescriptions:     buprenorphine (BUTRANS) 7.5 MCG/HR PTWK, Place 1 patch onto the skin once a week for 30 days. 7 days a week., Disp: 4 patch, Rfl: 0    traMADol (ULTRAM) 50 MG tablet, Take 1 tablet by mouth 2 times daily as needed for Pain for up to 30 days. ., Disp: 60 tablet, Rfl: 0    triamcinolone (KENALOG) 0.025 % LOTN lotion, Apply topically 3 times daily, Disp: , Rfl:     CPAP Machine MISC, by Does not apply route Please change ASV to Max pressure- 25 Min EPAP 5 Max EPAP 15 Min PS 4 Max PS 15, Disp: 1 each, Rfl: 0    albuterol sulfate HFA (PROAIR HFA) 108 (90 Base) MCG/ACT inhaler, Inhale 2 puffs into the lungs every 6 hours as needed for Wheezing, Disp: 1 Inhaler, Rfl: 11    fluticasone (FLONASE) 50 MCG/ACT nasal spray, 2 sprays by Nasal route daily, Disp: 1 Bottle, Rfl: 6   furosemide (LASIX) 20 MG tablet, Take 20 mg by mouth 2 times daily, Disp: , Rfl:     potassium chloride (KLOR-CON) 20 MEQ packet, Take 20 mEq by mouth daily , Disp: , Rfl:     Sacubitril-Valsartan (ENTRESTO PO), Take by mouth, Disp: , Rfl:     Omega-3 Fatty Acids (FISH OIL CONCENTRATE PO), Take by mouth, Disp: , Rfl:     lidocaine (XYLOCAINE) 5 % ointment, Apply topically as needed for Pain Apply topically as needed. , Disp: , Rfl:     hydrocortisone (WESTCORT) 0.2 % cream, Apply topically 2 times daily Apply topically 2 times daily. , Disp: , Rfl:     finasteride (PROSCAR) 5 MG tablet, Take 1 tablet by mouth daily, Disp: 90 tablet, Rfl: 3    tamsulosin (FLOMAX) 0.4 MG capsule, Take 1 capsule by mouth nightly, Disp: 90 capsule, Rfl: 3    amiodarone (CORDARONE) 200 MG tablet, Take 100 mg by mouth daily, Disp: , Rfl:     Calcium Citrate-Vitamin D (CALCIUM CITRATE +D PO), Take  by mouth daily. 630 mg - 500 IU, Disp: , Rfl:     hydrocortisone 0.5 % cream, Apply  topically daily as needed. Apply topically 2 times daily. , Disp: , Rfl:     METFORMIN HCL ER, OSM, PO, Take 500 mg by mouth 2 times daily. , Disp: , Rfl:     Cholecalciferol (VITAMIN D3) 2000 UNITS CAPS, Take  by mouth daily. , Disp: , Rfl:     Carvedilol (COREG PO), Take 6.25 mg by mouth 2 times daily , Disp: , Rfl:     aspirin 81 MG EC tablet, Take 81 mg by mouth daily , Disp: , Rfl:     Warfarin Sodium (COUMADIN PO), Take by mouth Followed by Dr. Esme Benton. 2.5 mg daily. , Disp: , Rfl:     omeprazole (PRILOSEC) 20 MG capsule, Take 20 mg by mouth daily. , Disp: , Rfl:     multivitamin (THERAGRAN) per tablet, Take 1 tablet by mouth daily. , Disp: , Rfl:     Ascorbic Acid (VITAMIN C) 500 MG tablet, Take 1,000 mg by mouth daily. , Disp: , Rfl:     vitamin B-12 (CYANOCOBALAMIN) 100 MCG tablet, Take 500 mcg by mouth daily. , Disp: , Rfl:         Subjective:      Review of Systems   Constitutional: Positive for activity change.  Negative for appetite

## 2018-10-23 DIAGNOSIS — G89.4 CHRONIC PAIN SYNDROME: ICD-10-CM

## 2018-10-23 RX ORDER — MULTIVIT WITH MINERALS/LUTEIN
1000 TABLET ORAL DAILY
COMMUNITY
End: 2020-07-14

## 2018-10-23 RX ORDER — AMIODARONE HYDROCHLORIDE 100 MG/1
100 TABLET ORAL DAILY
COMMUNITY

## 2018-10-23 RX ORDER — BUPRENORPHINE 7.5 UG/H
1 PATCH TRANSDERMAL WEEKLY
Qty: 4 PATCH | Refills: 0 | Status: SHIPPED | OUTPATIENT
Start: 2018-10-23 | End: 2018-11-16 | Stop reason: SDUPTHER

## 2018-10-23 RX ORDER — CARVEDILOL 6.25 MG/1
6.25 TABLET ORAL DAILY
COMMUNITY

## 2018-10-23 RX ORDER — TRAMADOL HYDROCHLORIDE 50 MG/1
50 TABLET ORAL EVERY 6 HOURS PRN
COMMUNITY
End: 2018-11-16 | Stop reason: ALTCHOICE

## 2018-10-29 ENCOUNTER — HOSPITAL ENCOUNTER (OUTPATIENT)
Age: 83
Setting detail: OUTPATIENT SURGERY
Discharge: HOME | End: 2018-10-29
Attending: PAIN MEDICINE | Admitting: PAIN MEDICINE
Payer: MEDICARE

## 2018-10-29 ENCOUNTER — APPOINTMENT (OUTPATIENT)
Dept: GENERAL RADIOLOGY | Age: 83
End: 2018-10-29
Attending: PAIN MEDICINE
Payer: MEDICARE

## 2018-10-29 ENCOUNTER — ANESTHESIA EVENT (OUTPATIENT)
Dept: OPERATING ROOM | Age: 83
End: 2018-10-29
Payer: MEDICARE

## 2018-10-29 ENCOUNTER — ANESTHESIA (OUTPATIENT)
Dept: OPERATING ROOM | Age: 83
End: 2018-10-29
Payer: MEDICARE

## 2018-10-29 VITALS
WEIGHT: 171.2 LBS | HEART RATE: 60 BPM | SYSTOLIC BLOOD PRESSURE: 104 MMHG | RESPIRATION RATE: 16 BRPM | TEMPERATURE: 97.4 F | DIASTOLIC BLOOD PRESSURE: 57 MMHG | OXYGEN SATURATION: 96 % | HEIGHT: 68 IN | BODY MASS INDEX: 25.94 KG/M2

## 2018-10-29 VITALS
DIASTOLIC BLOOD PRESSURE: 57 MMHG | OXYGEN SATURATION: 100 % | SYSTOLIC BLOOD PRESSURE: 102 MMHG | RESPIRATION RATE: 14 BRPM

## 2018-10-29 LAB
GLUCOSE BLD-MCNC: 92 MG/DL (ref 70–108)
POC INR: 1.1 (ref 0.8–1.2)
POC POTASSIUM, WHOLE BLOOD: 4.4 MEQ/L (ref 3.5–4.9)

## 2018-10-29 PROCEDURE — 84132 ASSAY OF SERUM POTASSIUM: CPT

## 2018-10-29 PROCEDURE — 3700000000 HC ANESTHESIA ATTENDED CARE: Performed by: PAIN MEDICINE

## 2018-10-29 PROCEDURE — 7100000010 HC PHASE II RECOVERY - FIRST 15 MIN: Performed by: PAIN MEDICINE

## 2018-10-29 PROCEDURE — 82948 REAGENT STRIP/BLOOD GLUCOSE: CPT

## 2018-10-29 PROCEDURE — 2580000003 HC RX 258: Performed by: PAIN MEDICINE

## 2018-10-29 PROCEDURE — 7100000011 HC PHASE II RECOVERY - ADDTL 15 MIN: Performed by: PAIN MEDICINE

## 2018-10-29 PROCEDURE — 3700000001 HC ADD 15 MINUTES (ANESTHESIA): Performed by: PAIN MEDICINE

## 2018-10-29 PROCEDURE — 6360000004 HC RX CONTRAST MEDICATION: Performed by: PAIN MEDICINE

## 2018-10-29 PROCEDURE — 6360000002 HC RX W HCPCS: Performed by: NURSE ANESTHETIST, CERTIFIED REGISTERED

## 2018-10-29 PROCEDURE — 2709999900 HC NON-CHARGEABLE SUPPLY: Performed by: PAIN MEDICINE

## 2018-10-29 PROCEDURE — 2500000003 HC RX 250 WO HCPCS: Performed by: PAIN MEDICINE

## 2018-10-29 PROCEDURE — 3209999900 FLUORO FOR SURGICAL PROCEDURES

## 2018-10-29 PROCEDURE — 6360000002 HC RX W HCPCS: Performed by: PAIN MEDICINE

## 2018-10-29 PROCEDURE — 3600000054 HC PAIN LEVEL 3 BASE: Performed by: PAIN MEDICINE

## 2018-10-29 PROCEDURE — 3600000055 HC PAIN LEVEL 3 ADDL 15 MIN: Performed by: PAIN MEDICINE

## 2018-10-29 PROCEDURE — 62323 NJX INTERLAMINAR LMBR/SAC: CPT | Performed by: PAIN MEDICINE

## 2018-10-29 RX ORDER — PROPOFOL 10 MG/ML
INJECTION, EMULSION INTRAVENOUS PRN
Status: DISCONTINUED | OUTPATIENT
Start: 2018-10-29 | End: 2018-10-29 | Stop reason: SDUPTHER

## 2018-10-29 RX ORDER — 0.9 % SODIUM CHLORIDE 0.9 %
VIAL (ML) INJECTION PRN
Status: DISCONTINUED | OUTPATIENT
Start: 2018-10-29 | End: 2018-10-29 | Stop reason: HOSPADM

## 2018-10-29 RX ORDER — LIDOCAINE HYDROCHLORIDE 10 MG/ML
INJECTION, SOLUTION INFILTRATION; PERINEURAL PRN
Status: DISCONTINUED | OUTPATIENT
Start: 2018-10-29 | End: 2018-10-29 | Stop reason: HOSPADM

## 2018-10-29 RX ORDER — DEXAMETHASONE SODIUM PHOSPHATE 4 MG/ML
INJECTION, SOLUTION INTRA-ARTICULAR; INTRALESIONAL; INTRAMUSCULAR; INTRAVENOUS; SOFT TISSUE PRN
Status: DISCONTINUED | OUTPATIENT
Start: 2018-10-29 | End: 2018-10-29 | Stop reason: HOSPADM

## 2018-10-29 RX ADMIN — PROPOFOL 50 MG: 10 INJECTION, EMULSION INTRAVENOUS at 13:46

## 2018-10-29 RX ADMIN — PROPOFOL 30 MG: 10 INJECTION, EMULSION INTRAVENOUS at 13:48

## 2018-10-29 ASSESSMENT — PULMONARY FUNCTION TESTS
PIF_VALUE: 0

## 2018-10-29 ASSESSMENT — PAIN - FUNCTIONAL ASSESSMENT: PAIN_FUNCTIONAL_ASSESSMENT: 0-10

## 2018-10-29 ASSESSMENT — PAIN DESCRIPTION - DESCRIPTORS: DESCRIPTORS: ACHING

## 2018-10-29 ASSESSMENT — PAIN SCALES - GENERAL: PAINLEVEL_OUTOF10: 4

## 2018-10-29 NOTE — ANESTHESIA POSTPROCEDURE EVALUATION
Department of Anesthesiology  Postprocedure Note    Patient: Joan Triana  MRN: [id number]  YOB: 1934  Date of evaluation: 10/29/2018  Time:  2:08 PM     Procedure Summary     Date:  10/29/18 Room / Location:  [facility] 03 / [address]    Anesthesia Start:  0698 Anesthesia Stop:  1350    Procedure:  LESI  L5 RIGHT SIDE (Right ) Diagnosis:  (SPINAL STENOSIS)    Surgeon:  Radha Aviles MD Responsible Provider:  Landy Escobar MD    Anesthesia Type:  MAC ASA Status:  2          Anesthesia Type: MAC    Josef Phase I:      Josef Phase II: Josef Score: 9    Last vitals: Reviewed and per EMR flowsheets. Anesthesia Post Evaluation    Patient location during evaluation: PACU  Patient participation: complete - patient participated  Level of consciousness: awake and alert  Airway patency: patent  Nausea & Vomiting: no nausea and no vomiting  Complications: no  Cardiovascular status: hemodynamically stable  Respiratory status: acceptable  Hydration status: euvolemic      [facility]  POST-ANESTHESIA NOTE       Name:  Joan Triana                                         Age:  80 y.o.   MRN:  [id number]      Last Vitals:  BP (!) 104/57   Pulse 60   Temp 97.4 °F (36.3 °C) (Temporal)   Resp 16   Ht 5' 8\" (1.727 m)   Wt 171 lb 3.2 oz (77.7 kg)   SpO2 96%   BMI 26.03 kg/m²   Patient Vitals for the past 4 hrs:   BP Temp Temp src Pulse Resp SpO2 Height Weight   10/29/18 1352 (!) 104/57 97.4 °F (36.3 °C) Temporal 60 16 96 % - -   10/29/18 1304 (!) 152/70 97.8 °F (36.6 °C) Temporal 60 16 98 % 5' 8\" (1.727 m) 171 lb 3.2 oz (77.7 kg)       Level of Consciousness:  Awake    Respiratory:  Stable    Oxygen Saturation:  Stable    Cardiovascular:  Stable    Hydration:  Adequate    PONV:  Stable    Post-op Pain:  Adequate analgesia    Post-op Assessment:  No apparent anesthetic complications    Additional Follow-Up / Treatment / Comment:  Jazmine Mims MD  October

## 2018-10-29 NOTE — PROGRESS NOTES
1352-Patient to Phase II via cart. Report received from [facility] ([facility]). Patient awake and alert. Vitals obtained and stable. Respirations even and unlabored on room air. Injection site open to air. No drainage noted. 1400-Patient provided with snack and drink. Wife to bedside. 1405-Report given to [employer].

## 2018-10-29 NOTE — OP NOTE
the back was prepped and draped in sterile manner. Then using fluoroscopy the L5 interspace was observed and the skin and deep tissues in the right paramedian area were infiltrated with 3 ml of 1% lidocaine. The #20-gauge, 3-1/2 inch Tuohy needle was inserted through the skin wheal and the epidural space was identified using loss of resistance technique . This was confirmed with AP and lateral views using fluoroscopy after injecting about 2 ml of Omnipaque-180 and observing the spread of the contrast in the epidural space. Then after negative aspiration a total of 6 mg of dexamethasone with 3 ml of normal saline was injected into the epidural space. The needle is removed and a Band-Aid was placed over the needle insertion site. No paresthesia. Patient's vital signs remained stable and the patient tolerated the procedure well. The patient will be discharged home in stable condition and will be followed in the office in the next few weeks for further planning.     Electronically signed by Cristy Ashraf MD on 10/29/2018 at 1:51 PM

## 2018-10-29 NOTE — PROGRESS NOTES
Resting quietly in recliner with feet elevated and call light in reach and warmer applied. Pt PT/INR POCT 1.1. Pt states he stopped his potassium  X 1 week but continued Lasix. POCT K+ ordered per anesthesia. 4.4 mg/dl noted and Dr. Anitha Coon updated. OK to continue with procedure.

## 2018-10-30 ENCOUNTER — PROCEDURE VISIT (OUTPATIENT)
Dept: NEUROLOGY | Age: 83
End: 2018-10-30
Payer: MEDICARE

## 2018-10-30 DIAGNOSIS — G62.9 NEUROPATHY: ICD-10-CM

## 2018-10-30 DIAGNOSIS — M54.16 LUMBAR RADICULOPATHY: Primary | ICD-10-CM

## 2018-10-30 DIAGNOSIS — R20.0 NUMBNESS OF RIGHT FOOT: ICD-10-CM

## 2018-10-30 DIAGNOSIS — M79.604 BILATERAL LEG PAIN: ICD-10-CM

## 2018-10-30 DIAGNOSIS — M79.605 BILATERAL LEG PAIN: ICD-10-CM

## 2018-10-30 PROCEDURE — 95886 MUSC TEST DONE W/N TEST COMP: CPT | Performed by: PSYCHIATRY & NEUROLOGY

## 2018-10-30 PROCEDURE — 95910 NRV CNDJ TEST 7-8 STUDIES: CPT | Performed by: PSYCHIATRY & NEUROLOGY

## 2018-11-06 ENCOUNTER — OFFICE VISIT (OUTPATIENT)
Dept: UROLOGY | Age: 83
End: 2018-11-06
Payer: MEDICARE

## 2018-11-06 VITALS
BODY MASS INDEX: 27.61 KG/M2 | HEIGHT: 66 IN | WEIGHT: 171.8 LBS | SYSTOLIC BLOOD PRESSURE: 130 MMHG | DIASTOLIC BLOOD PRESSURE: 62 MMHG

## 2018-11-06 DIAGNOSIS — R39.15 URINARY URGENCY: ICD-10-CM

## 2018-11-06 DIAGNOSIS — N40.1 BENIGN PROSTATIC HYPERPLASIA WITH LOWER URINARY TRACT SYMPTOMS, SYMPTOM DETAILS UNSPECIFIED: Primary | ICD-10-CM

## 2018-11-06 LAB
BILIRUBIN URINE: NEGATIVE
BLOOD URINE, POC: NORMAL
CHARACTER, URINE: CLEAR
COLOR, URINE: YELLOW
GLUCOSE URINE: NEGATIVE MG/DL
KETONES, URINE: NEGATIVE
LEUKOCYTE CLUMPS, URINE: NEGATIVE
NITRITE, URINE: NEGATIVE
PH, URINE: 6
POST VOID RESIDUAL (PVR): 0 ML
PROTEIN, URINE: NEGATIVE MG/DL
SPECIFIC GRAVITY, URINE: 1.01 (ref 1–1.03)
UROBILINOGEN, URINE: 1 EU/DL

## 2018-11-06 PROCEDURE — G8427 DOCREV CUR MEDS BY ELIG CLIN: HCPCS | Performed by: NURSE PRACTITIONER

## 2018-11-06 PROCEDURE — 99213 OFFICE O/P EST LOW 20 MIN: CPT | Performed by: NURSE PRACTITIONER

## 2018-11-06 PROCEDURE — 1123F ACP DISCUSS/DSCN MKR DOCD: CPT | Performed by: NURSE PRACTITIONER

## 2018-11-06 PROCEDURE — 4040F PNEUMOC VAC/ADMIN/RCVD: CPT | Performed by: NURSE PRACTITIONER

## 2018-11-06 PROCEDURE — G8417 CALC BMI ABV UP PARAM F/U: HCPCS | Performed by: NURSE PRACTITIONER

## 2018-11-06 PROCEDURE — 81003 URINALYSIS AUTO W/O SCOPE: CPT | Performed by: NURSE PRACTITIONER

## 2018-11-06 PROCEDURE — 1036F TOBACCO NON-USER: CPT | Performed by: NURSE PRACTITIONER

## 2018-11-06 PROCEDURE — 51798 US URINE CAPACITY MEASURE: CPT | Performed by: NURSE PRACTITIONER

## 2018-11-06 PROCEDURE — G8484 FLU IMMUNIZE NO ADMIN: HCPCS | Performed by: NURSE PRACTITIONER

## 2018-11-06 PROCEDURE — 1101F PT FALLS ASSESS-DOCD LE1/YR: CPT | Performed by: NURSE PRACTITIONER

## 2018-11-06 RX ORDER — TAMSULOSIN HYDROCHLORIDE 0.4 MG/1
0.4 CAPSULE ORAL NIGHTLY
Qty: 90 CAPSULE | Refills: 3 | Status: SHIPPED | OUTPATIENT
Start: 2018-11-06 | End: 2019-11-12

## 2018-11-06 RX ORDER — FINASTERIDE 5 MG/1
5 TABLET, FILM COATED ORAL DAILY
Qty: 90 TABLET | Refills: 3 | Status: SHIPPED | OUTPATIENT
Start: 2018-11-06 | End: 2019-11-12

## 2018-11-16 DIAGNOSIS — G89.4 CHRONIC PAIN SYNDROME: Primary | ICD-10-CM

## 2018-11-16 RX ORDER — BUPRENORPHINE 7.5 UG/H
1 PATCH TRANSDERMAL WEEKLY
Qty: 4 PATCH | Refills: 0 | Status: SHIPPED | OUTPATIENT
Start: 2018-11-23 | End: 2018-11-20 | Stop reason: SDUPTHER

## 2018-11-16 RX ORDER — TRAMADOL HYDROCHLORIDE 50 MG/1
50 TABLET ORAL 2 TIMES DAILY PRN
Qty: 60 TABLET | Refills: 0 | Status: SHIPPED | OUTPATIENT
Start: 2018-11-16 | End: 2018-12-16

## 2018-11-20 ENCOUNTER — OFFICE VISIT (OUTPATIENT)
Dept: PHYSICAL MEDICINE AND REHAB | Age: 83
End: 2018-11-20
Payer: MEDICARE

## 2018-11-20 VITALS
BODY MASS INDEX: 27.6 KG/M2 | DIASTOLIC BLOOD PRESSURE: 65 MMHG | HEART RATE: 63 BPM | SYSTOLIC BLOOD PRESSURE: 118 MMHG | WEIGHT: 171.74 LBS | HEIGHT: 66 IN

## 2018-11-20 DIAGNOSIS — M53.3 SACROILIAC JOINT DYSFUNCTION: ICD-10-CM

## 2018-11-20 DIAGNOSIS — M47.816 SPONDYLOSIS OF LUMBAR REGION WITHOUT MYELOPATHY OR RADICULOPATHY: Primary | ICD-10-CM

## 2018-11-20 DIAGNOSIS — M48.061 SPINAL STENOSIS OF LUMBAR REGION WITHOUT NEUROGENIC CLAUDICATION: ICD-10-CM

## 2018-11-20 DIAGNOSIS — M54.17 LUMBOSACRAL RADICULITIS: ICD-10-CM

## 2018-11-20 DIAGNOSIS — G89.4 CHRONIC PAIN SYNDROME: ICD-10-CM

## 2018-11-20 PROCEDURE — 99214 OFFICE O/P EST MOD 30 MIN: CPT | Performed by: NURSE PRACTITIONER

## 2018-11-20 PROCEDURE — 1101F PT FALLS ASSESS-DOCD LE1/YR: CPT | Performed by: NURSE PRACTITIONER

## 2018-11-20 PROCEDURE — G8484 FLU IMMUNIZE NO ADMIN: HCPCS | Performed by: NURSE PRACTITIONER

## 2018-11-20 PROCEDURE — G8427 DOCREV CUR MEDS BY ELIG CLIN: HCPCS | Performed by: NURSE PRACTITIONER

## 2018-11-20 PROCEDURE — G8417 CALC BMI ABV UP PARAM F/U: HCPCS | Performed by: NURSE PRACTITIONER

## 2018-11-20 PROCEDURE — 4040F PNEUMOC VAC/ADMIN/RCVD: CPT | Performed by: NURSE PRACTITIONER

## 2018-11-20 PROCEDURE — 1123F ACP DISCUSS/DSCN MKR DOCD: CPT | Performed by: NURSE PRACTITIONER

## 2018-11-20 PROCEDURE — 1036F TOBACCO NON-USER: CPT | Performed by: NURSE PRACTITIONER

## 2018-11-20 RX ORDER — BUPRENORPHINE 7.5 UG/H
1 PATCH TRANSDERMAL WEEKLY
Qty: 4 PATCH | Refills: 0 | Status: SHIPPED | OUTPATIENT
Start: 2018-11-23 | End: 2018-12-19 | Stop reason: SDUPTHER

## 2018-11-20 ASSESSMENT — ENCOUNTER SYMPTOMS
DIARRHEA: 0
CHEST TIGHTNESS: 0
COUGH: 0
RHINORRHEA: 0
PHOTOPHOBIA: 0
SINUS PRESSURE: 0
ABDOMINAL PAIN: 0
WHEEZING: 0
COLOR CHANGE: 0
VOMITING: 0
CONSTIPATION: 0
SHORTNESS OF BREATH: 0
EYE PAIN: 0
SORE THROAT: 0
NAUSEA: 0
BACK PAIN: 1

## 2018-11-20 NOTE — PROGRESS NOTES
[facility]  [address] [facility] [address]  Dept: [phone]  Dept Fax: [phone]: [phone]    Visit Date: 11/20/2018    Functionality Assessment/Goals Worksheet     On a scale of 0 (Does not Interfere) to 10 (Completely Interferes)     1. Which number describes how during the past week pain has interfered with       the following:  A. General Activity:  5  B. Mood: 1  C. Walking Ability:  5  D. Normal Work (Includes both work outside the home and housework):  5  E. Relations with Other People:   2  F. Sleep:   2  G. Enjoyment of Life:   6    2. Patient Prefers to Take their Pain Medications:     [x]  On a regular basis   []  Only when necessary    []  Does not take pain medications    3. What are the Patient's Goals/Expectations for Visiting Pain Management? [x]  Learn about my pain    [x]  Receive Medication   []  Physical Therapy     []  Treat Depression   []  Receive Injections    []  Treat Sleep   []  Deal with Anxiety and Stress   []  Treat Opoid Dependence/Addiction   []  Other:      HPI:   Anabel Nicholson is a 80 y.o. male is here today for    Chief Complaint: Low back pain  Right leg pain right SI pain    HPI   F/U LESI @ L5 right on 10/29/2018 states his right leg pain is gone  100 % relief but did not help his lumbar pain at all. He continues to have lumbar pain and takes Tramadol and Butrans patch. EMG showed L5 right radiculopathy but he denies any radicular s/s at this time. Medications reviewed. Patient denies  side effects with medications. Patient states he is  taking medications as prescribed. Hedenies receiving pain medications from other sources. He denies any ER visits since last visit. Pain scale with out pain medications is 9/10. Pain scale with pain medications is  3/10.   Last dose of  Butrans patch Tramadol  was today  Drug screen reviewed from  9/26/2018

## 2018-12-03 ENCOUNTER — HOSPITAL ENCOUNTER (OUTPATIENT)
Dept: PULMONOLOGY | Age: 83
Discharge: HOME | End: 2018-12-03
Payer: MEDICARE

## 2018-12-03 DIAGNOSIS — J98.4 RESTRICTIVE LUNG DISEASE DUE TO KYPHOSCOLIOSIS: ICD-10-CM

## 2018-12-03 DIAGNOSIS — M41.9 RESTRICTIVE LUNG DISEASE DUE TO KYPHOSCOLIOSIS: ICD-10-CM

## 2018-12-03 PROCEDURE — 94060 EVALUATION OF WHEEZING: CPT

## 2018-12-03 PROCEDURE — 94729 DIFFUSING CAPACITY: CPT

## 2018-12-03 PROCEDURE — 94726 PLETHYSMOGRAPHY LUNG VOLUMES: CPT

## 2018-12-03 PROCEDURE — 2709999900 HC NON-CHARGEABLE SUPPLY

## 2018-12-06 ENCOUNTER — OFFICE VISIT (OUTPATIENT)
Dept: PULMONOLOGY | Age: 83
End: 2018-12-06
Payer: MEDICARE

## 2018-12-06 VITALS
HEIGHT: 65 IN | HEART RATE: 70 BPM | WEIGHT: 170 LBS | DIASTOLIC BLOOD PRESSURE: 70 MMHG | OXYGEN SATURATION: 99 % | BODY MASS INDEX: 28.32 KG/M2 | SYSTOLIC BLOOD PRESSURE: 120 MMHG

## 2018-12-06 DIAGNOSIS — R05.3 CHRONIC COUGH: ICD-10-CM

## 2018-12-06 DIAGNOSIS — M41.9 RESTRICTIVE LUNG DISEASE DUE TO KYPHOSCOLIOSIS: ICD-10-CM

## 2018-12-06 DIAGNOSIS — I27.20 PULMONARY HYPERTENSION (HCC): ICD-10-CM

## 2018-12-06 DIAGNOSIS — R06.3 CENTRAL SLEEP APNEA DUE TO CHEYNE-STOKES RESPIRATION: Primary | ICD-10-CM

## 2018-12-06 DIAGNOSIS — R06.02 SOB (SHORTNESS OF BREATH): ICD-10-CM

## 2018-12-06 DIAGNOSIS — J98.4 RESTRICTIVE LUNG DISEASE DUE TO KYPHOSCOLIOSIS: ICD-10-CM

## 2018-12-06 PROCEDURE — G8484 FLU IMMUNIZE NO ADMIN: HCPCS | Performed by: PHYSICIAN ASSISTANT

## 2018-12-06 PROCEDURE — 1101F PT FALLS ASSESS-DOCD LE1/YR: CPT | Performed by: PHYSICIAN ASSISTANT

## 2018-12-06 PROCEDURE — 1036F TOBACCO NON-USER: CPT | Performed by: PHYSICIAN ASSISTANT

## 2018-12-06 PROCEDURE — 99214 OFFICE O/P EST MOD 30 MIN: CPT | Performed by: PHYSICIAN ASSISTANT

## 2018-12-06 PROCEDURE — G8417 CALC BMI ABV UP PARAM F/U: HCPCS | Performed by: PHYSICIAN ASSISTANT

## 2018-12-06 PROCEDURE — 4040F PNEUMOC VAC/ADMIN/RCVD: CPT | Performed by: PHYSICIAN ASSISTANT

## 2018-12-06 PROCEDURE — G8427 DOCREV CUR MEDS BY ELIG CLIN: HCPCS | Performed by: PHYSICIAN ASSISTANT

## 2018-12-06 PROCEDURE — 1123F ACP DISCUSS/DSCN MKR DOCD: CPT | Performed by: PHYSICIAN ASSISTANT

## 2018-12-06 ASSESSMENT — ENCOUNTER SYMPTOMS
DIARRHEA: 0
ALLERGIC/IMMUNOLOGIC NEGATIVE: 1
COUGH: 0
NAUSEA: 0
CHEST TIGHTNESS: 0
EYES NEGATIVE: 1
BACK PAIN: 0
WHEEZING: 0
STRIDOR: 0
SHORTNESS OF BREATH: 0

## 2018-12-06 NOTE — PROGRESS NOTES
[facility]      Nikkie Perez         [id number]  12/6/2018   Chief Complaint   Patient presents with    Sleep Apnea     PATRICIA 6 month f/u with [facility] download         Pt of Dr. Anshu Chavez     PAP Download:   Original or initialAHI: 22.3    Date of initial study: 8/12/13      Neck Size:  17.5  [id number]  ESS:  11      Presentation:   Gabby Joshi presents for sleep medicine follow up for obstructive sleep apnea  Since the last visit, Gabby Joshi has not been wearing PAP. He has not worn PAP in months. He sleeps better without it and does not feel it helps. He states that he has not been SOB and rarely using albuterol. Sleep issues:  Do you feel better? Yes  More rested? Yes   Better concentration? yes    Progress History:   Since last visit any new medical issues? No  New ER or hospitlal visits? No  Any new or changes in medicines? No  Any new sleep medicines?  No        Past Medical History:   Diagnosis Date    Atrial fibrillation ([address]     Bacterial pneumonia 02/07/2014    CAD (coronary artery disease)     mitral valve repair only    Chronic kidney disease     Collar bone fracture 09/1949    right side    Diabetes mellitus ([address]     Enlarged prostate Early 90's    Hypertension 8/20/2013    Mild acid reflux 8/20/2013    Pancreatitis, gallstone     Sleep apnea     doesn't wear CPAP    UTI (urinary tract infection)        Past Surgical History:   Procedure Laterality Date    CARDIAC SURGERY      mitral valve repair    CATARACT REMOVAL      CHOLECYSTECTOMY      COLONOSCOPY  01/12/2011,10/09/2015    ENDOSCOPY, COLON, DIAGNOSTIC      HERNIA REPAIR      HERNIA REPAIR Right 03/12/2010   Lenin Desais PACEMAKER PLACEMENT  3/23/15    [facility] [place] [facility] [address]    [address] DX/THER SBST INTRLMNR LMBR/SAC W/IMG GDN Right 10/29/2018    LESI  L5 RIGHT SIDE performed by Alivia Valdivia MD at 1 [facility]      negative    TONSILLECTOMY AND Cholecalciferol (VITAMIN D3) 2000 UNITS CAPS Take  by mouth daily.  aspirin 81 MG EC tablet Take 81 mg by mouth daily       Warfarin Sodium (COUMADIN PO) Take by mouth Followed by Dr. Zoey Kirkland 2.5 mg daily.  omeprazole (PRILOSEC) 20 MG capsule Take 20 mg by mouth daily.  multivitamin (THERAGRAN) per tablet Take 1 tablet by mouth daily.  vitamin B-12 (CYANOCOBALAMIN) 100 MCG tablet Take 500 mcg by mouth daily.  fluticasone (FLONASE) 50 MCG/ACT nasal spray 2 sprays by Nasal route daily 1 Bottle 6     No current facility-administered medications for this visit. Family History   Problem Relation Age of Onset    Diabetes Mother     High Blood Pressure Mother     Cancer Father     Heart Disease Father     Stroke Neg Hx         Review of Systems -   Review of Systems   Constitutional: Negative for activity change, appetite change, chills and fever. HENT: Negative for congestion and postnasal drip. Eyes: Negative. Respiratory: Negative for cough, chest tightness, shortness of breath, wheezing and stridor. Cardiovascular: Negative for chest pain and leg swelling. Gastrointestinal: Negative for diarrhea and nausea. Endocrine: Negative. Genitourinary: Negative. Musculoskeletal: Negative. Negative for arthralgias and back pain. Skin: Negative. Allergic/Immunologic: Negative. Neurological: Negative. Negative for dizziness and light-headedness. Psychiatric/Behavioral: Negative. All other systems reviewed and are negative. Physical Exam:    BMI:  Body mass index is 28.29 kg/m². Wt Readings from Last 3 Encounters:   12/06/18 170 lb (77.1 kg)   11/20/18 171 lb 11.8 oz (77.9 kg)   11/06/18 171 lb 12.8 oz (77.9 kg)     Weight stable / unchanged  Vitals: /70   Pulse 70   Ht 5' 5\" (1.651 m)   Wt 170 lb (77.1 kg)   SpO2 99% Comment: room air  BMI 28.29 kg/m²       Physical Exam   Constitutional: He is oriented to person, place, and time.

## 2018-12-19 DIAGNOSIS — G89.4 CHRONIC PAIN SYNDROME: ICD-10-CM

## 2018-12-19 DIAGNOSIS — M54.17 LUMBOSACRAL RADICULITIS: ICD-10-CM

## 2018-12-19 RX ORDER — BUPRENORPHINE 7.5 UG/H
1 PATCH TRANSDERMAL WEEKLY
Qty: 4 PATCH | Refills: 0 | Status: SHIPPED | OUTPATIENT
Start: 2018-12-21 | End: 2019-01-14 | Stop reason: SDUPTHER

## 2019-01-02 ENCOUNTER — PREP FOR PROCEDURE (OUTPATIENT)
Dept: PHYSICAL MEDICINE AND REHAB | Age: 84
End: 2019-01-02

## 2019-01-07 ENCOUNTER — TELEPHONE (OUTPATIENT)
Dept: PHYSICAL MEDICINE AND REHAB | Age: 84
End: 2019-01-07

## 2019-01-14 DIAGNOSIS — G89.4 CHRONIC PAIN SYNDROME: ICD-10-CM

## 2019-01-14 DIAGNOSIS — M54.17 LUMBOSACRAL RADICULITIS: ICD-10-CM

## 2019-01-14 RX ORDER — BUPRENORPHINE 7.5 UG/H
1 PATCH TRANSDERMAL WEEKLY
Qty: 4 PATCH | Refills: 0 | Status: SHIPPED | OUTPATIENT
Start: 2019-01-18 | End: 2019-02-14 | Stop reason: SDUPTHER

## 2019-01-21 DIAGNOSIS — G89.4 CHRONIC PAIN SYNDROME: Primary | ICD-10-CM

## 2019-01-21 RX ORDER — TRAMADOL HYDROCHLORIDE 50 MG/1
50 TABLET ORAL 2 TIMES DAILY PRN
Qty: 60 TABLET | Refills: 0 | Status: SHIPPED | OUTPATIENT
Start: 2019-01-21 | End: 2019-02-20

## 2019-01-21 RX ORDER — LIDOCAINE 50 MG/G
OINTMENT TOPICAL PRN
Qty: 3 TUBE | Refills: 2 | Status: SHIPPED | OUTPATIENT
Start: 2019-01-21 | End: 2020-02-06 | Stop reason: SDUPTHER

## 2019-01-29 ENCOUNTER — OFFICE VISIT (OUTPATIENT)
Dept: PHYSICAL MEDICINE AND REHAB | Age: 84
End: 2019-01-29
Payer: MEDICARE

## 2019-01-29 VITALS
HEART RATE: 62 BPM | BODY MASS INDEX: 28.32 KG/M2 | SYSTOLIC BLOOD PRESSURE: 113 MMHG | HEIGHT: 65 IN | WEIGHT: 169.97 LBS | DIASTOLIC BLOOD PRESSURE: 64 MMHG

## 2019-01-29 DIAGNOSIS — M53.3 SACROILIAC JOINT DYSFUNCTION: ICD-10-CM

## 2019-01-29 DIAGNOSIS — M48.061 SPINAL STENOSIS OF LUMBAR REGION WITHOUT NEUROGENIC CLAUDICATION: ICD-10-CM

## 2019-01-29 DIAGNOSIS — M47.816 SPONDYLOSIS OF LUMBAR REGION WITHOUT MYELOPATHY OR RADICULOPATHY: Primary | ICD-10-CM

## 2019-01-29 DIAGNOSIS — G89.4 CHRONIC PAIN SYNDROME: ICD-10-CM

## 2019-01-29 PROCEDURE — 1036F TOBACCO NON-USER: CPT | Performed by: NURSE PRACTITIONER

## 2019-01-29 PROCEDURE — 4040F PNEUMOC VAC/ADMIN/RCVD: CPT | Performed by: NURSE PRACTITIONER

## 2019-01-29 PROCEDURE — G8484 FLU IMMUNIZE NO ADMIN: HCPCS | Performed by: NURSE PRACTITIONER

## 2019-01-29 PROCEDURE — 1101F PT FALLS ASSESS-DOCD LE1/YR: CPT | Performed by: NURSE PRACTITIONER

## 2019-01-29 PROCEDURE — G8427 DOCREV CUR MEDS BY ELIG CLIN: HCPCS | Performed by: NURSE PRACTITIONER

## 2019-01-29 PROCEDURE — 99213 OFFICE O/P EST LOW 20 MIN: CPT | Performed by: NURSE PRACTITIONER

## 2019-01-29 PROCEDURE — G8417 CALC BMI ABV UP PARAM F/U: HCPCS | Performed by: NURSE PRACTITIONER

## 2019-01-29 PROCEDURE — 1123F ACP DISCUSS/DSCN MKR DOCD: CPT | Performed by: NURSE PRACTITIONER

## 2019-01-29 ASSESSMENT — ENCOUNTER SYMPTOMS
DIARRHEA: 0
EYE PAIN: 0
SINUS PRESSURE: 0
SHORTNESS OF BREATH: 0
COUGH: 0
BACK PAIN: 1
CONSTIPATION: 0
CHEST TIGHTNESS: 0
WHEEZING: 0
NAUSEA: 0
RHINORRHEA: 0
COLOR CHANGE: 0
ABDOMINAL PAIN: 0
SORE THROAT: 0
PHOTOPHOBIA: 0
VOMITING: 0

## 2019-02-14 DIAGNOSIS — M54.17 LUMBOSACRAL RADICULITIS: ICD-10-CM

## 2019-02-14 DIAGNOSIS — G89.4 CHRONIC PAIN SYNDROME: ICD-10-CM

## 2019-02-14 RX ORDER — BUPRENORPHINE 7.5 UG/H
1 PATCH TRANSDERMAL WEEKLY
Qty: 4 PATCH | Refills: 0 | Status: SHIPPED | OUTPATIENT
Start: 2019-02-15 | End: 2019-03-11 | Stop reason: SDUPTHER

## 2019-03-11 DIAGNOSIS — M54.17 LUMBOSACRAL RADICULITIS: ICD-10-CM

## 2019-03-11 DIAGNOSIS — G89.4 CHRONIC PAIN SYNDROME: ICD-10-CM

## 2019-03-11 RX ORDER — BUPRENORPHINE 7.5 UG/H
1 PATCH TRANSDERMAL WEEKLY
Qty: 4 PATCH | Refills: 0 | Status: SHIPPED | OUTPATIENT
Start: 2019-03-18 | End: 2019-04-11 | Stop reason: SDUPTHER

## 2019-03-12 ENCOUNTER — HOSPITAL ENCOUNTER (OUTPATIENT)
Dept: GENERAL RADIOLOGY | Age: 84
Discharge: HOME | End: 2019-03-12
Payer: MEDICARE

## 2019-03-12 ENCOUNTER — OFFICE VISIT (OUTPATIENT)
Dept: PULMONOLOGY | Age: 84
End: 2019-03-12
Payer: MEDICARE

## 2019-03-12 VITALS
SYSTOLIC BLOOD PRESSURE: 124 MMHG | RESPIRATION RATE: 15 BRPM | OXYGEN SATURATION: 92 % | DIASTOLIC BLOOD PRESSURE: 72 MMHG | HEART RATE: 68 BPM | HEIGHT: 68 IN | BODY MASS INDEX: 26.52 KG/M2 | WEIGHT: 175 LBS

## 2019-03-12 DIAGNOSIS — R06.3 CENTRAL SLEEP APNEA DUE TO CHEYNE-STOKES RESPIRATION: ICD-10-CM

## 2019-03-12 DIAGNOSIS — I27.20 PULMONARY HYPERTENSION (HCC): ICD-10-CM

## 2019-03-12 DIAGNOSIS — R94.2 DECREASED DIFFUSION CAPACITY OF LUNG: ICD-10-CM

## 2019-03-12 DIAGNOSIS — J45.40 MODERATE PERSISTENT ASTHMA WITHOUT COMPLICATION: Primary | ICD-10-CM

## 2019-03-12 DIAGNOSIS — Z00.6 EXAMINATION FOR NORMAL COMPARISON FOR CLINICAL RESEARCH: ICD-10-CM

## 2019-03-12 DIAGNOSIS — J98.4 RESTRICTIVE LUNG DISEASE: ICD-10-CM

## 2019-03-12 DIAGNOSIS — I48.20 CHRONIC ATRIAL FIBRILLATION (HCC): ICD-10-CM

## 2019-03-12 DIAGNOSIS — R06.02 SOB (SHORTNESS OF BREATH): ICD-10-CM

## 2019-03-12 DIAGNOSIS — M41.9 KYPHOSCOLIOSIS: ICD-10-CM

## 2019-03-12 DIAGNOSIS — Z98.890 H/O MITRAL VALVE REPAIR: ICD-10-CM

## 2019-03-12 PROCEDURE — 99215 OFFICE O/P EST HI 40 MIN: CPT | Performed by: NURSE PRACTITIONER

## 2019-03-12 PROCEDURE — 1036F TOBACCO NON-USER: CPT | Performed by: NURSE PRACTITIONER

## 2019-03-12 PROCEDURE — G8427 DOCREV CUR MEDS BY ELIG CLIN: HCPCS | Performed by: NURSE PRACTITIONER

## 2019-03-12 PROCEDURE — 3209999900 XR COMPARISON OF OUTSIDE FILMS

## 2019-03-12 PROCEDURE — 1101F PT FALLS ASSESS-DOCD LE1/YR: CPT | Performed by: NURSE PRACTITIONER

## 2019-03-12 PROCEDURE — 95012 NITRIC OXIDE EXP GAS DETER: CPT | Performed by: NURSE PRACTITIONER

## 2019-03-12 PROCEDURE — 4040F PNEUMOC VAC/ADMIN/RCVD: CPT | Performed by: NURSE PRACTITIONER

## 2019-03-12 PROCEDURE — 1123F ACP DISCUSS/DSCN MKR DOCD: CPT | Performed by: NURSE PRACTITIONER

## 2019-03-12 PROCEDURE — G8417 CALC BMI ABV UP PARAM F/U: HCPCS | Performed by: NURSE PRACTITIONER

## 2019-03-12 PROCEDURE — G8484 FLU IMMUNIZE NO ADMIN: HCPCS | Performed by: NURSE PRACTITIONER

## 2019-03-12 ASSESSMENT — ENCOUNTER SYMPTOMS
COUGH: 0
EYES NEGATIVE: 1
NAUSEA: 0
BACK PAIN: 0
DIARRHEA: 0
STRIDOR: 0
WHEEZING: 1
SHORTNESS OF BREATH: 1
CHEST TIGHTNESS: 1

## 2019-03-29 ENCOUNTER — TELEPHONE (OUTPATIENT)
Dept: SURGERY | Age: 84
End: 2019-03-29

## 2019-04-01 ENCOUNTER — OFFICE VISIT (OUTPATIENT)
Dept: SURGERY | Age: 84
End: 2019-04-01
Payer: MEDICARE

## 2019-04-01 VITALS
SYSTOLIC BLOOD PRESSURE: 110 MMHG | HEIGHT: 68 IN | HEART RATE: 87 BPM | BODY MASS INDEX: 25.61 KG/M2 | WEIGHT: 169 LBS | OXYGEN SATURATION: 98 % | RESPIRATION RATE: 20 BRPM | DIASTOLIC BLOOD PRESSURE: 74 MMHG | TEMPERATURE: 97.9 F

## 2019-04-01 DIAGNOSIS — I48.20 CHRONIC ATRIAL FIBRILLATION (HCC): ICD-10-CM

## 2019-04-01 DIAGNOSIS — S80.11XA TRAUMATIC HEMATOMA OF RIGHT LOWER LEG, INITIAL ENCOUNTER: Primary | ICD-10-CM

## 2019-04-01 DIAGNOSIS — Z79.01 ON WARFARIN THERAPY: ICD-10-CM

## 2019-04-01 PROCEDURE — 1123F ACP DISCUSS/DSCN MKR DOCD: CPT | Performed by: SURGERY

## 2019-04-01 PROCEDURE — G8427 DOCREV CUR MEDS BY ELIG CLIN: HCPCS | Performed by: SURGERY

## 2019-04-01 PROCEDURE — G8417 CALC BMI ABV UP PARAM F/U: HCPCS | Performed by: SURGERY

## 2019-04-01 PROCEDURE — 4040F PNEUMOC VAC/ADMIN/RCVD: CPT | Performed by: SURGERY

## 2019-04-01 PROCEDURE — 99202 OFFICE O/P NEW SF 15 MIN: CPT | Performed by: SURGERY

## 2019-04-01 PROCEDURE — 1036F TOBACCO NON-USER: CPT | Performed by: SURGERY

## 2019-04-01 NOTE — PROGRESS NOTES
Cassidy Ortiz MD   General Surgery  New Patient Evaluation in Office  Pt Name: Salma Rooney  Date of Birth 1934   Today's Date: 4/1/2019  Medical Record Number: [id number]  Referring Provider: MEGHANN Santana - *  Primary Care Provider: MEGHANN Nieves - CNP  Chief Complaint:  Chief Complaint   Patient presents with   [facility]     new patient--ref by AR Graham for right lower leg hematoma       ASSESSMENT      1. Traumatic hematoma of right lower leg, initial encounter    2. On warfarin therapy    3. Chronic atrial fibrillation (HCC)         PLANS      1. No intervention is needed for this resolving hematoma post traumatic right lower extremity. 2.  Continue compression garments bilateral lower extremities. 3.  Vascular Doppler studies reviewed no evidence of DVT. Patient on chronic warfarin therapy. 4.  All questions answered. Call with any questions or concerns. Romy Ruby is a 80y.o. year old male who is presenting today in the office for evaluation of a hematoma on his right lower extremity. Les Deleon requested to come to my office as I took care of his wife's breast cancer many years ago. He is on chronic Coumadin therapy has chronic atrial fibrillation and believes he bumped his leg about a month ago he doesn't remember. He has chronic venous insufficiency and brawny induration of his lower extremities. He wears compression stockings daily for many years. He stated he had a palpable lump anterior compartment of his right leg. He is really not discretely palpable anymore. He underwent bilateral lower extremity venous Dopplers as well as an ultrasound of the subcutaneous tissues. There was a superficial bruises about 1.2 cm at the time of the ultrasound. It is not discretely palpable. He has a palpable dorsalis pedis pulse on the right. I don't feel he requires any further intervention at this time. All his questions were answered.        Past Medical History  Past Medical History:   Diagnosis Date    Atrial fibrillation ([facility] [address]     Bacterial pneumonia 02/07/2014    CAD (coronary artery disease)     mitral valve repair only    Chronic kidney disease     Collar bone fracture 09/1949    right side    Diabetes mellitus ([facility] [address]     Enlarged prostate Early 90's    Hypertension 8/20/2013    Mild acid reflux 8/20/2013    Pancreatitis, gallstone     Sleep apnea     doesn't wear CPAP    UTI (urinary tract infection)        Past Surgical History  Past Surgical History:   Procedure Laterality Date    CARDIAC SURGERY      mitral valve repair    CATARACT REMOVAL      CHOLECYSTECTOMY      COLONOSCOPY  01/12/2011,10/09/2015    ENDOSCOPY, COLON, DIAGNOSTIC      HERNIA REPAIR      HERNIA REPAIR Right 03/12/2010   Angelina Larch PACEMAKER PLACEMENT  3/23/15    [facility] [place] [facility] [id number]    [place] NJX DX/THER SBST INTRLMNR LMBR/SAC W/IMG GDN Right 10/29/2018    LESI  L5 RIGHT SIDE performed by Kajal Redding MD at 1 [facility]      negative    TONSILLECTOMY AND ADENOIDECTOMY      TURP  01/04/2010       Medications  Current Outpatient Medications   Medication Sig Dispense Refill    UNABLE TO FIND Ipraitropi/alb 0.5/3 mg inh with salt air elite enhaler      fluticasone (ARNUITY ELLIPTA) 100 MCG/ACT AEPB Inhale 100 mcg into the lungs daily 30 each 11    buprenorphine (BUTRANS) 7.5 MCG/HR PTWK Place 1 patch onto the skin once a week for 28 days. 7 days a week 4 patch 0    lidocaine (XYLOCAINE) 5 % ointment Apply topically as needed for Pain Apply topically as needed.  3 Tube 2    finasteride (PROSCAR) 5 MG tablet Take 1 tablet by mouth daily 90 tablet 3    tamsulosin (FLOMAX) 0.4 MG capsule Take 1 capsule by mouth nightly 90 capsule 3    amiodarone (PACERONE) 100 MG tablet Take 100 mg by mouth daily      Ascorbic Acid (VITAMIN C) 1000 MG tablet Take 1,000 mg by mouth daily      Calcium Carb-Cholecalciferol (CALCIUM/VITAMIN D PO) Take by mouth daily 630-500      carvedilol (COREG) 6.25 MG tablet Take 6.25 mg by mouth 2 times daily (with meals)      metFORMIN (GLUCOPHAGE) 500 MG tablet Take 500 mg by mouth 2 times daily (with meals)      sacubitril-valsartan (ENTRESTO) 24-26 MG per tablet Take 1 tablet by mouth 2 times daily      triamcinolone (KENALOG) 0.025 % LOTN lotion Apply topically as needed       albuterol sulfate HFA (PROAIR HFA) 108 (90 Base) MCG/ACT inhaler Inhale 2 puffs into the lungs every 6 hours as needed for Wheezing 1 Inhaler 11    furosemide (LASIX) 20 MG tablet Take 20 mg by mouth 2 times daily      potassium chloride (KLOR-CON) 20 MEQ packet Take 20 mEq by mouth daily       Omega-3 Fatty Acids (FISH OIL CONCENTRATE PO) Take by mouth daily       hydrocortisone (WESTCORT) 0.2 % cream Apply topically as needed Apply topically 2 times daily.  Cholecalciferol (VITAMIN D3) 2000 UNITS CAPS Take  by mouth daily.  aspirin 81 MG EC tablet Take 81 mg by mouth daily       Warfarin Sodium (COUMADIN PO) Take by mouth Followed by Dr. Kortney Covarrubias. 2.5 mg daily.  omeprazole (PRILOSEC) 20 MG capsule Take 20 mg by mouth daily.  multivitamin (THERAGRAN) per tablet Take 1 tablet by mouth daily.  vitamin B-12 (CYANOCOBALAMIN) 100 MCG tablet Take 500 mcg by mouth daily. No current facility-administered medications for this visit.       Allergies   No Known Allergies    Family History  Family History   Problem Relation Age of Onset    Diabetes Mother     High Blood Pressure Mother     Cancer Father     Heart Disease Father     Stroke Neg Hx        SocialHistory  Social History     Socioeconomic History    Marital status:      Spouse name: Not on file    Number of children: Not on file    Years of education: Not on file    Highest education level: Not on file   Occupational History    Not on file   Social Needs    Financial resource strain: Not on file    Food insecurity:     Worry: Not on file     Inability: Not on file    Transportation needs:     Medical: Not on file     Non-medical: Not on file   Tobacco Use    Smoking status: Never Smoker    Smokeless tobacco: Never Used    Tobacco comment: never   Substance and Sexual Activity    Alcohol use: Yes     Alcohol/week: 0.0 oz     Types: 1 Glasses of wine per week     Comment: very little. right now none.  Drug use: No    Sexual activity: Yes   Lifestyle    Physical activity:     Days per week: Not on file     Minutes per session: Not on file    Stress: Not on file   Relationships    Social connections:     Talks on phone: Not on file     Gets together: Not on file     Attends Congregation service: Not on file     Active member of club or organization: Not on file     Attends meetings of clubs or organizations: Not on file     Relationship status: Not on file    Intimate partner violence:     Fear of current or ex partner: Not on file     Emotionally abused: Not on file     Physically abused: Not on file     Forced sexual activity: Not on file   Other Topics Concern    Not on file   Social History Narrative    Not on file           Review of Systems  Review of Systems   Constitutional: Negative for chills, fatigue, fever and unexpected weight change. HENT: Positive for hearing loss. Negative for sore throat, trouble swallowing and voice change. Eyes: Negative for visual disturbance. Respiratory: Positive for shortness of breath and wheezing. Negative for cough. Cardiovascular: Positive for leg swelling. Negative for chest pain and palpitations. Gastrointestinal: Negative for abdominal pain, blood in stool, nausea and vomiting. Endocrine: Negative for cold intolerance, heat intolerance and polydipsia. Genitourinary: Negative for dysuria, flank pain and hematuria. Musculoskeletal: Negative for gait problem, joint swelling and myalgias. Skin: Negative for color change and rash.    Allergic/Immunologic: Negative for 10/12/2012    BUN 13 10/12/2012    CO2 27 10/12/2012    TSH 0.645 10/11/2012    INR 1.10 10/29/2018

## 2019-04-01 NOTE — LETTER
[facility]  [address]  [address]  Phone: [phone]  Fax: [phone]    Lashanda Matta MD        April 2, 2019    [address]  [address] [address]    Patient: Hemal Gomez   MR Number: [id number]   YOB: 1934   Date of Visit: 4/1/2019     Dear Thena Ahumada. Agustin,    Thank you for the request for consultation for Hemal Gomez to me for the evaluation of a contusion of the right lower leg. Below are the relevant portions of my assessment and plan of care. 1. Traumatic hematoma of right lower leg, initial encounter    2. On warfarin therapy    3. Chronic atrial fibrillation ([facility]           1. No intervention is needed for this resolving hematoma post traumatic right lower extremity. 2.  Continue compression garments bilateral lower extremities. 3.  Vascular Doppler studies reviewed no evidence of DVT. Patient on chronic warfarin therapy. 4.  All questions answered. Call with any questions or concerns. If you have questions, please do not hesitate to call me. I look forward to following Derek Carreno along with you.     Sincerely,          Lashanda Matta MD

## 2019-04-02 ASSESSMENT — ENCOUNTER SYMPTOMS
ABDOMINAL PAIN: 0
NAUSEA: 0
SHORTNESS OF BREATH: 1
COUGH: 0
SORE THROAT: 0
COLOR CHANGE: 0
TROUBLE SWALLOWING: 0
WHEEZING: 1
VOMITING: 0
BLOOD IN STOOL: 0
VOICE CHANGE: 0

## 2019-04-10 DIAGNOSIS — M54.17 LUMBOSACRAL RADICULITIS: ICD-10-CM

## 2019-04-10 DIAGNOSIS — G89.4 CHRONIC PAIN SYNDROME: ICD-10-CM

## 2019-04-10 NOTE — TELEPHONE ENCOUNTER
Steven Agent called requesting a refill on the following medications:  Requested Prescriptions     Pending Prescriptions Disp Refills    buprenorphine (BUTRANS) 7.5 MCG/HR PTWK 4 patch 0     Sig: Place 1 patch onto the skin once a week for 28 days.  7 days a week     Pharmacy verified: [facility] [address]       ++++++pt is aware that this is a few days early     Date of last visit: 1/29/19  Date of next visit (if applicable): 0/77/7278

## 2019-04-11 RX ORDER — BUPRENORPHINE 7.5 UG/H
1 PATCH TRANSDERMAL WEEKLY
Qty: 4 PATCH | Refills: 0 | Status: SHIPPED | OUTPATIENT
Start: 2019-04-13 | End: 2019-05-01 | Stop reason: SDUPTHER

## 2019-05-01 ENCOUNTER — OFFICE VISIT (OUTPATIENT)
Dept: PHYSICAL MEDICINE AND REHAB | Age: 84
End: 2019-05-01
Payer: MEDICARE

## 2019-05-01 VITALS
SYSTOLIC BLOOD PRESSURE: 115 MMHG | BODY MASS INDEX: 25.61 KG/M2 | WEIGHT: 169 LBS | DIASTOLIC BLOOD PRESSURE: 63 MMHG | HEART RATE: 63 BPM | HEIGHT: 68 IN

## 2019-05-01 DIAGNOSIS — M48.061 SPINAL STENOSIS OF LUMBAR REGION WITHOUT NEUROGENIC CLAUDICATION: ICD-10-CM

## 2019-05-01 DIAGNOSIS — M53.3 SACROILIAC JOINT DYSFUNCTION: ICD-10-CM

## 2019-05-01 DIAGNOSIS — G89.4 CHRONIC PAIN SYNDROME: ICD-10-CM

## 2019-05-01 DIAGNOSIS — M54.17 LUMBOSACRAL RADICULITIS: ICD-10-CM

## 2019-05-01 DIAGNOSIS — M47.816 SPONDYLOSIS OF LUMBAR REGION WITHOUT MYELOPATHY OR RADICULOPATHY: Primary | ICD-10-CM

## 2019-05-01 PROCEDURE — G8417 CALC BMI ABV UP PARAM F/U: HCPCS | Performed by: NURSE PRACTITIONER

## 2019-05-01 PROCEDURE — 1036F TOBACCO NON-USER: CPT | Performed by: NURSE PRACTITIONER

## 2019-05-01 PROCEDURE — 99213 OFFICE O/P EST LOW 20 MIN: CPT | Performed by: NURSE PRACTITIONER

## 2019-05-01 PROCEDURE — 1123F ACP DISCUSS/DSCN MKR DOCD: CPT | Performed by: NURSE PRACTITIONER

## 2019-05-01 PROCEDURE — G8427 DOCREV CUR MEDS BY ELIG CLIN: HCPCS | Performed by: NURSE PRACTITIONER

## 2019-05-01 PROCEDURE — 4040F PNEUMOC VAC/ADMIN/RCVD: CPT | Performed by: NURSE PRACTITIONER

## 2019-05-01 RX ORDER — BUPRENORPHINE 7.5 UG/H
1 PATCH TRANSDERMAL WEEKLY
Qty: 4 PATCH | Refills: 0 | Status: SHIPPED | OUTPATIENT
Start: 2019-05-08 | End: 2019-06-04 | Stop reason: SDUPTHER

## 2019-05-01 RX ORDER — TRAMADOL HYDROCHLORIDE 50 MG/1
50 TABLET ORAL 3 TIMES DAILY PRN
Qty: 90 TABLET | Refills: 0 | Status: SHIPPED | OUTPATIENT
Start: 2019-05-01 | End: 2019-05-31

## 2019-05-01 ASSESSMENT — ENCOUNTER SYMPTOMS
SHORTNESS OF BREATH: 0
COUGH: 0
SORE THROAT: 0
COLOR CHANGE: 0
NAUSEA: 0
DIARRHEA: 0
ABDOMINAL PAIN: 0
BACK PAIN: 1
PHOTOPHOBIA: 0
RHINORRHEA: 0
WHEEZING: 0
CONSTIPATION: 0
CHEST TIGHTNESS: 0
SINUS PRESSURE: 0
EYE PAIN: 0
VOMITING: 0

## 2019-05-01 NOTE — PROGRESS NOTES
[facility]  [address] [facility] [address]  Dept: [phone]  Dept Fax: [phone]  Loc: [phone]    Visit Date: 5/1/2019    Functionality Assessment/Goals Worksheet     On a scale of 0 (Does not Interfere) to 10 (Completely Interferes)     1. Which number describes how during the past week pain has interfered with       the following:  A. General Activity:  6  B. Mood: 3  C. Walking Ability:  4  D. Normal Work (Includes both work outside the home and housework):  6  E. Relations with Other People:   1  F. Sleep:   2  G. Enjoyment of Life:   4    2. Patient Prefers to Take their Pain Medications:     [x]  On a regular basis   []  Only when necessary    []  Does not take pain medications    3. What are the Patient's Goals/Expectations for Visiting Pain Management? [x]  Learn about my pain    [x]  Receive Medication   []  Physical Therapy     []  Treat Depression   []  Receive Injections    []  Treat Sleep   []  Deal with Anxiety and Stress   []  Treat Opoid Dependence/Addiction   []  Other:      HPI:   Hemal Gomez is a 80 y.o. male is here today for    Chief Complaint: Low back pain Si leg pain    HPI   Here with cane  F/U has had LESI L5 right with 80-90% pan relief for RLE pain. , no relief for low back pain. He continues to have low back pain and SI pain. He states the LESI Si starting to wear off from 10/29/2018. He states the pain is tolerable and manageable. He continues to take Tramadol prn and wears Butrans patch. He uses lidocaine gel also. He had RLE hematoma Feb 2019 and had  vascular studies were negative for  DVT, he was following surgeon Dr Zaki Jacinto, she did not evacuate the hematoma. Medications reviewed. Patient denies side effects with medications. Patient states he is taking medications as prescribed. Hedenies receiving pain medications from other sources.  He denies any ER visits since last visit. Pain scale with out pain medications or at its worst is 5/10. Pain scale with pain medications or at its best is 0/10. Last dose of Butrans patch is on [facility]  Tramadol last week was today  Drug screen reviewed from 1/29/2019  and was appropriate  Pill count was not completed today and patient was educated on bringing in medications  Patient does not have naloxone available at home. Patient has not required use of naloxone at home since last office visit. The patienthas No Known Allergies. Past Medical History  Tyson Watts  has a past medical history of Atrial fibrillation ([facility] [address] Bacterial pneumonia, CAD (coronary artery disease), Chronic kidney disease, Collar bone fracture, Diabetes mellitus ([facility] [address] Enlarged prostate, Hypertension, Mild acid reflux, Pancreatitis, gallstone, Sleep apnea, and UTI (urinary tract infection). Past Surgical History  The patient  has a past surgical history that includes hernia repair; Tonsillectomy and adenoidectomy; Cholecystectomy; Endoscopy, colon, diagnostic; Colonoscopy (01/12/2011,10/09/2015); Cardiac surgery; skin biopsy; Cataract removal; TURP (01/04/2010); hernia repair (Right, 03/12/2010); pacemaker placement (3/23/15); and pr njx dx/ther sbst intrlmnr lmbr/sac w/img gdn (Right, 10/29/2018). Family History  This patient's family history includes Cancer in his father; Diabetes in his mother; Heart Disease in his father; High Blood Pressure in his mother. Social History  Tyson Watts  reports that he has never smoked. He has never used smokeless tobacco. He reports that he drinks alcohol. He reports that he does not use drugs. Medications    Current Outpatient Medications:     [START ON 5/8/2019] buprenorphine (BUTRANS) 7.5 MCG/HR PTWK, Place 1 patch onto the skin once a week for 28 days. 7 days a week, Disp: 4 patch, Rfl: 0    traMADol (ULTRAM) 50 MG tablet, Take 1 tablet by mouth 3 times daily as needed for Pain for up to 30 days. , Disp: 90 tablet, Rfl: 0    UNABLE TO FIND, Ipraitropi/alb 0.5/3 mg inh with [facility] elite enhaler, Disp: , Rfl:     fluticasone (ARNUITY ELLIPTA) 100 MCG/ACT AEPB, Inhale 100 mcg into the lungs daily, Disp: 30 each, Rfl: 11    lidocaine (XYLOCAINE) 5 % ointment, Apply topically as needed for Pain Apply topically as needed. , Disp: 3 Tube, Rfl: 2    finasteride (PROSCAR) 5 MG tablet, Take 1 tablet by mouth daily, Disp: 90 tablet, Rfl: 3    tamsulosin (FLOMAX) 0.4 MG capsule, Take 1 capsule by mouth nightly, Disp: 90 capsule, Rfl: 3    amiodarone (PACERONE) 100 MG tablet, Take 100 mg by mouth daily, Disp: , Rfl:     Ascorbic Acid (VITAMIN C) 1000 MG tablet, Take 1,000 mg by mouth daily, Disp: , Rfl:     Calcium Carb-Cholecalciferol (CALCIUM/VITAMIN D PO), Take by mouth daily 630-500, Disp: , Rfl:     carvedilol (COREG) 6.25 MG tablet, Take 6.25 mg by mouth 2 times daily (with meals), Disp: , Rfl:     metFORMIN (GLUCOPHAGE) 500 MG tablet, Take 500 mg by mouth 2 times daily (with meals), Disp: , Rfl:     sacubitril-valsartan (ENTRESTO) 24-26 MG per tablet, Take 1 tablet by mouth 2 times daily, Disp: , Rfl:     triamcinolone (KENALOG) 0.025 % LOTN lotion, Apply topically as needed , Disp: , Rfl:     albuterol sulfate HFA (PROAIR HFA) 108 (90 Base) MCG/ACT inhaler, Inhale 2 puffs into the lungs every 6 hours as needed for Wheezing, Disp: 1 Inhaler, Rfl: 11    furosemide (LASIX) 20 MG tablet, Take 20 mg by mouth 2 times daily, Disp: , Rfl:     potassium chloride (KLOR-CON) 20 MEQ packet, Take 20 mEq by mouth daily , Disp: , Rfl:     Omega-3 Fatty Acids (FISH OIL CONCENTRATE PO), Take by mouth daily , Disp: , Rfl:     hydrocortisone (WESTCORT) 0.2 % cream, Apply topically as needed Apply topically 2 times daily. , Disp: , Rfl:     Cholecalciferol (VITAMIN D3) 2000 UNITS CAPS, Take  by mouth daily. , Disp: , Rfl:     aspirin 81 MG EC tablet, Take 81 mg by mouth daily , Disp: , Rfl:     Warfarin Sodium (COUMADIN PO), Take by mouth Followed by Dr. Elizabeth Cruz. 2.5 mg daily. , Disp: , Rfl:     omeprazole (PRILOSEC) 20 MG capsule, Take 20 mg by mouth daily. , Disp: , Rfl:     multivitamin (THERAGRAN) per tablet, Take 1 tablet by mouth daily. , Disp: , Rfl:     vitamin B-12 (CYANOCOBALAMIN) 100 MCG tablet, Take 500 mcg by mouth daily. , Disp: , Rfl:     Subjective:      Review of Systems   Constitutional: Positive for activity change. Negative for appetite change, chills, diaphoresis, fatigue, fever and unexpected weight change. HENT: Negative for congestion, ear pain, hearing loss, mouth sores, nosebleeds, rhinorrhea, sinus pressure and sore throat. Eyes: Negative for photophobia, pain and visual disturbance. Respiratory: Negative for cough, chest tightness, shortness of breath and wheezing. PATRICIA has ASTHMA   Cardiovascular: Negative for chest pain and palpitations. 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 GERD   Endocrine: Negative for cold intolerance, heat intolerance, polydipsia, polyphagia and polyuria. DM   Genitourinary: Negative for decreased urine volume, difficulty urinating, frequency and hematuria. Prostate issues   Musculoskeletal: Positive for arthralgias, back pain, gait problem, joint swelling, myalgias, neck pain and neck stiffness. Skin: Negative for color change and rash. Allergic/Immunologic: Negative for food allergies and immunocompromised state. Neurological: Positive for weakness and numbness. Negative for dizziness, tremors, seizures, syncope, facial asymmetry, speech difficulty, light-headedness and headaches. Hematological: Does not bruise/bleed easily. Psychiatric/Behavioral: Negative for agitation, behavioral problems, confusion, decreased concentration, dysphoric mood, hallucinations, self-injury, sleep disturbance and suicidal ideas.  The patient is not nervous/anxious and is not hyperactive. Objective:     Vitals:    05/01/19 0826   BP: 115/63   Site: Right Upper Arm   Position: Sitting   Cuff Size: Medium Adult   Pulse: 63   Weight: 169 lb (76.7 kg)   Height: 5' 8\" (1.727 m)       Physical Exam   Constitutional: He is oriented to person, place, and time. He appears well-developed and well-nourished. No distress. HENT:   Head: Normocephalic and atraumatic. Right Ear: External ear normal.   Left Ear: External ear normal.   Nose: Nose normal.   Mouth/Throat: Oropharynx is clear and moist. No oropharyngeal exudate. Eyes: Pupils are equal, round, and reactive to light. Conjunctivae and EOM are normal. Right eye exhibits no discharge. Left eye exhibits no discharge. No scleral icterus. Neck: Normal range of motion and full passive range of motion without pain. Neck supple. No muscular tenderness present. No neck rigidity. No edema, no erythema and normal range of motion present. No thyromegaly present. Cardiovascular: Normal rate, regular rhythm, normal heart sounds and intact distal pulses. Exam reveals no gallop and no friction rub. No murmur heard. Pulmonary/Chest: Effort normal and breath sounds normal. No respiratory distress. He has no wheezes. He has no rales. He exhibits no tenderness. Wears CPAP at hs   Abdominal: Soft. Bowel sounds are normal. He exhibits no distension. There is no tenderness. There is no rebound and no guarding. Musculoskeletal: He exhibits tenderness. Right shoulder: Normal.        Left shoulder: Normal.        Right wrist: Normal.        Right hip: He exhibits decreased range of motion, decreased strength, tenderness and bony tenderness. Left hip: He exhibits tenderness. Right knee: He exhibits decreased range of motion and swelling. Tenderness found. Left knee: Tenderness found. Right ankle: Tenderness.         Left ankle: Normal.        Cervical back: Normal.        Thoracic back: He exhibits neurogenic claudication    4. Sacroiliac joint dysfunction    5. Chronic pain syndrome            Plan:      · OARRS reviewed. Current MED: 15  · Patient was not offered naloxone for home. · Discussed long term side effects of medications, tolerance, dependency and addiction. · Previous UDS reviewed  · UDS preformed today for compliance. · Patient told can not receive any pain medications from any other source. · No evidence of abuse, diversion or aberrant behavior.  Medications and/or procedures to improve function and quality of life- patient understanding with this and that may not be pain free   Discussed with patient about safe storage of medications at home   Discussed possible weaning of medication dosing dependent on treatment/procedure results.  Testing: none   Procedures: May repeat LESI L5 right, discussed Lumbar and SI MBB if need   Discussed with patient about risks with procedure including infection, reaction to medication, increased pain, or bleeding.  Medications:Continue Tramadol prn and Butrans patch. Meds. Prescribed:   Orders Placed This Encounter   Medications    buprenorphine (BUTRANS) 7.5 MCG/HR PTWK     Sig: Place 1 patch onto the skin once a week for 28 days. 7 days a week     Dispense:  4 patch     Refill:  0     Reduce doses taken as pain becomes manageable    traMADol (ULTRAM) 50 MG tablet     Sig: Take 1 tablet by mouth 3 times daily as needed for Pain for up to 30 days. Dispense:  90 tablet     Refill:  0     Reduce doses taken as pain becomes manageable       Return in about 3 months (around 8/1/2019) for Follow up pain medications.          Electronically signed by MEGHANN Newell CNP on5/1/2019 at 8:53 AM

## 2019-06-04 DIAGNOSIS — G89.4 CHRONIC PAIN SYNDROME: ICD-10-CM

## 2019-06-04 DIAGNOSIS — M54.17 LUMBOSACRAL RADICULITIS: ICD-10-CM

## 2019-06-05 RX ORDER — BUPRENORPHINE 7.5 UG/H
1 PATCH TRANSDERMAL WEEKLY
Qty: 4 PATCH | Refills: 0 | Status: SHIPPED | OUTPATIENT
Start: 2019-06-10 | End: 2019-07-01 | Stop reason: SDUPTHER

## 2019-06-12 ENCOUNTER — OFFICE VISIT (OUTPATIENT)
Dept: PULMONOLOGY | Age: 84
End: 2019-06-12
Payer: MEDICARE

## 2019-06-12 VITALS
HEIGHT: 68 IN | BODY MASS INDEX: 26.1 KG/M2 | HEART RATE: 60 BPM | WEIGHT: 172.2 LBS | TEMPERATURE: 98.5 F | SYSTOLIC BLOOD PRESSURE: 124 MMHG | DIASTOLIC BLOOD PRESSURE: 62 MMHG | OXYGEN SATURATION: 96 %

## 2019-06-12 DIAGNOSIS — J41.1 CHRONIC BRONCHITIS WITH PRODUCTIVE MUCOPURULENT COUGH (HCC): ICD-10-CM

## 2019-06-12 DIAGNOSIS — J98.4 RESTRICTIVE LUNG DISEASE: ICD-10-CM

## 2019-06-12 DIAGNOSIS — J45.40 MODERATE PERSISTENT ASTHMA WITHOUT COMPLICATION: Primary | ICD-10-CM

## 2019-06-12 DIAGNOSIS — R94.2 DECREASED DIFFUSION CAPACITY OF LUNG: ICD-10-CM

## 2019-06-12 PROCEDURE — 1036F TOBACCO NON-USER: CPT | Performed by: NURSE PRACTITIONER

## 2019-06-12 PROCEDURE — 3023F SPIROM DOC REV: CPT | Performed by: NURSE PRACTITIONER

## 2019-06-12 PROCEDURE — G8427 DOCREV CUR MEDS BY ELIG CLIN: HCPCS | Performed by: NURSE PRACTITIONER

## 2019-06-12 PROCEDURE — 1123F ACP DISCUSS/DSCN MKR DOCD: CPT | Performed by: NURSE PRACTITIONER

## 2019-06-12 PROCEDURE — 99214 OFFICE O/P EST MOD 30 MIN: CPT | Performed by: NURSE PRACTITIONER

## 2019-06-12 PROCEDURE — G8417 CALC BMI ABV UP PARAM F/U: HCPCS | Performed by: NURSE PRACTITIONER

## 2019-06-12 PROCEDURE — 4040F PNEUMOC VAC/ADMIN/RCVD: CPT | Performed by: NURSE PRACTITIONER

## 2019-06-12 PROCEDURE — G8926 SPIRO NO PERF OR DOC: HCPCS | Performed by: NURSE PRACTITIONER

## 2019-06-12 RX ORDER — TRAMADOL HYDROCHLORIDE 50 MG/1
50 TABLET ORAL 2 TIMES DAILY PRN
COMMUNITY
End: 2020-02-06 | Stop reason: SDUPTHER

## 2019-06-12 ASSESSMENT — ENCOUNTER SYMPTOMS
RHINORRHEA: 0
SINUS PAIN: 0
WHEEZING: 0
EYES NEGATIVE: 1
SHORTNESS OF BREATH: 1
TROUBLE SWALLOWING: 1
VOMITING: 0
VOICE CHANGE: 1
ABDOMINAL PAIN: 0
DIARRHEA: 0
NAUSEA: 0
COUGH: 1
ALLERGIC/IMMUNOLOGIC NEGATIVE: 1

## 2019-06-12 NOTE — PROGRESS NOTES
[facility]     Patient: Sherrie Joseph, 80 y.o.   : 1934    Pt of Dr. Mike Wallace   Patient presents with    Follow-up     Asthma 3 month pulmonary follow up. Medication check        HPI  Arianne Bush is here for  3 month follow up for Asthma  FENO level 71 3/12/19: was started on Arnuity 100 mcg daily   CXR 3/2019-  No acute findings     Has had chronic cough- worse over past 3 months - worse in mornings  Sputum is light tan to dark tan, and sometimes tinges of blood. Never is bright red or dark brown color   Takes Coumadin daily   No fever, chills, weight loss, or appetite changes. Non smoker. Denies GERD symptoms- takes omeprazole 20 mg PO daily in AM, does sometimes eat carmels or peanuts right before bed, sleeps with HOB elevated  Denies leg swelling     SOB has been stable  No wheezing  Has tried occ Robitussin for cough with moderate relief,  Has not been on any recent antibiotics or oral steroids. No ER visits or hospitalizations   6 min walk completed 3/2019- no hypoxia     Reports occasional dysphagia when eating something dry and crunchy like dry cereal, does not occur every time. , denies dental problems    Past Medical hx   PMH:  Past Medical History:   Diagnosis Date    Atrial fibrillation ([address]     Bacterial pneumonia 2014    CAD (coronary artery disease)     mitral valve repair only    Chronic kidney disease     Collar bone fracture 1949    right side    Diabetes mellitus ([address]     Enlarged prostate Early     Hypertension 2013    Mild acid reflux 2013    Pancreatitis, gallstone     Sleep apnea     doesn't wear CPAP    UTI (urinary tract infection)      SURGICAL HISTORY:  Past Surgical History:   Procedure Laterality Date    CARDIAC SURGERY      mitral valve repair    CATARACT REMOVAL      CHOLECYSTECTOMY      COLONOSCOPY  2011,10/09/2015    ENDOSCOPY, COLON, DIAGNOSTIC      HERNIA REPAIR      HERNIA REPAIR Right 03/12/2010   [facility] PACEMAKER PLACEMENT  3/23/15    Heart Accident [place] [id number]    [place] NJX DX/THER SBST INTRLMNR LMBR/SAC W/IMG GDN Right 10/29/2018    LESI  L5 RIGHT SIDE performed by Odalis Vasquez MD at [facility]      negative    TONSILLECTOMY AND ADENOIDECTOMY      TURP  01/04/2010     SOCIAL HISTORY:  Social History     Tobacco Use    Smoking status: Never Smoker    Smokeless tobacco: Never Used    Tobacco comment: never   Substance Use Topics    Alcohol use: Yes     Alcohol/week: 0.0 oz     Types: 1 Glasses of wine per week     Comment: very little. right now none.  Drug use: No     ALLERGIES:No Known Allergies  FAMILY HISTORY:  Family History   Problem Relation Age of Onset    Diabetes Mother     High Blood Pressure Mother     Cancer Father     Heart Disease Father     Stroke Neg Hx      CURRENT MEDICATIONS:  Current Outpatient Medications   Medication Sig Dispense Refill    traMADol (ULTRAM) 50 MG tablet Take 50 mg by mouth 2 times daily as needed for Pain.  buprenorphine (BUTRANS) 7.5 MCG/HR PTWK Place 1 patch onto the skin once a week for 28 days. 7 days a week 4 patch 0    fluticasone (ARNUITY ELLIPTA) 100 MCG/ACT AEPB Inhale 100 mcg into the lungs daily 30 each 11    lidocaine (XYLOCAINE) 5 % ointment Apply topically as needed for Pain Apply topically as needed.  3 Tube 2    finasteride (PROSCAR) 5 MG tablet Take 1 tablet by mouth daily 90 tablet 3    tamsulosin (FLOMAX) 0.4 MG capsule Take 1 capsule by mouth nightly 90 capsule 3    amiodarone (PACERONE) 100 MG tablet Take 100 mg by mouth daily      Ascorbic Acid (VITAMIN C) 1000 MG tablet Take 1,000 mg by mouth daily      Calcium Carb-Cholecalciferol (CALCIUM/VITAMIN D PO) Take by mouth daily 630-500      carvedilol (COREG) 6.25 MG tablet Take 6.25 mg by mouth 2 times daily (with meals)      metFORMIN (GLUCOPHAGE) 500 MG tablet Take 500 mg by mouth 2 times daily (with meals)      sacubitril-valsartan (ENTRESTO) 24-26 MG per tablet Take 1 tablet by mouth 2 times daily      triamcinolone (KENALOG) 0.025 % LOTN lotion Apply topically as needed       albuterol sulfate HFA (PROAIR HFA) 108 (90 Base) MCG/ACT inhaler Inhale 2 puffs into the lungs every 6 hours as needed for Wheezing 1 Inhaler 11    furosemide (LASIX) 20 MG tablet Take 20 mg by mouth 2 times daily      potassium chloride (KLOR-CON) 20 MEQ packet Take 20 mEq by mouth daily       Omega-3 Fatty Acids (FISH OIL CONCENTRATE PO) Take by mouth daily       hydrocortisone (WESTCORT) 0.2 % cream Apply topically as needed Apply topically 2 times daily.  Cholecalciferol (VITAMIN D3) 2000 UNITS CAPS Take  by mouth daily.  aspirin 81 MG EC tablet Take 81 mg by mouth daily       Warfarin Sodium (COUMADIN PO) Take by mouth Followed by Dr. Jude Castanon. 2.5 mg daily.  omeprazole (PRILOSEC) 20 MG capsule Take 20 mg by mouth daily.  multivitamin (THERAGRAN) per tablet Take 1 tablet by mouth daily.  vitamin B-12 (CYANOCOBALAMIN) 100 MCG tablet Take 500 mcg by mouth daily. No current facility-administered medications for this visit. Yanira MONTGOMERY   Review of Systems   Constitutional: Negative for chills, fatigue, fever and unexpected weight change. HENT: Positive for trouble swallowing (with drier foods- cause coughing ) and voice change (little hoarseness ). Negative for congestion, dental problem, postnasal drip, rhinorrhea and sinus pain. Eyes: Negative. Negative for visual disturbance. Respiratory: Positive for cough and shortness of breath (with exertion ). Negative for wheezing. Cardiovascular: Negative for chest pain, palpitations and leg swelling. Gastrointestinal: Negative for abdominal pain, diarrhea, nausea and vomiting. Genitourinary: Negative. Musculoskeletal: Negative. Skin: Negative. Allergic/Immunologic: Negative. Neurological: Negative. Hematological: Does not bruise/bleed easily. Psychiatric/Behavioral: Negative for sleep disturbance and suicidal ideas. Physical exam   /62 (Site: Left Upper Arm, Position: Sitting, Cuff Size: Medium Adult)   Pulse 60   Temp 98.5 °F (36.9 °C) (Oral)   Ht 5' 8\" (1.727 m)   Wt 172 lb 3.2 oz (78.1 kg)   SpO2 96% Comment: on RA  BMI 26.18 kg/m²        Physical Exam   Constitutional: He is oriented to person, place, and time. He appears well-developed and well-nourished. No distress. HENT:   Mouth/Throat: No oropharyngeal exudate. Eyes: Conjunctivae are normal. Right eye exhibits no discharge. No scleral icterus. Neck: Neck supple. No JVD present. Cardiovascular: Normal rate and regular rhythm. Exam reveals no friction rub. No murmur heard. Pulmonary/Chest: Effort normal. No respiratory distress. He has no wheezes. He has rales (fine crackles bibasilar - greater on right ). He exhibits no tenderness. Abdominal: Soft. Musculoskeletal: He exhibits no edema or tenderness. Kyphoscoliosis    Lymphadenopathy:     He has no cervical adenopathy. Neurological: He is alert and oriented to person, place, and time. Coordination (use of walker - forward posturing ) abnormal.   Skin: Skin is warm and dry. Capillary refill takes less than 2 seconds. Psychiatric: He has a normal mood and affect. His behavior is normal. Judgment and thought content normal.   Nursing note and vitals reviewed. Test results   Lung Nodule Screening     [] Qualifies    [x]Does not qualify   [] Declined    [] Completed          Assessment      Diagnosis Orders   1. Moderate persistent asthma without complication  POCT Nitric Oxide    CT CHEST HIGH RESOLUTION    Sputum culture   2. Chronic bronchitis with productive mucopurulent cough (HCC)  CT CHEST HIGH RESOLUTION    Sputum culture   3. Decreased diffusion capacity of lung  CT CHEST HIGH RESOLUTION    Sputum culture   4.  Restrictive

## 2019-07-01 DIAGNOSIS — G89.4 CHRONIC PAIN SYNDROME: ICD-10-CM

## 2019-07-01 DIAGNOSIS — M54.17 LUMBOSACRAL RADICULITIS: ICD-10-CM

## 2019-07-01 RX ORDER — BUPRENORPHINE 7.5 UG/H
1 PATCH TRANSDERMAL WEEKLY
Qty: 4 PATCH | Refills: 0 | Status: SHIPPED | OUTPATIENT
Start: 2019-07-05 | End: 2019-07-30 | Stop reason: SDUPTHER

## 2019-07-01 NOTE — TELEPHONE ENCOUNTER
Reynaldo Lucio called requesting a refill on the following medications:  Requested Prescriptions     Pending Prescriptions Disp Refills    buprenorphine (BUTRANS) 7.5 MCG/HR PTWK 4 patch 0     Sig: Place 1 patch onto the skin once a week for 28 days. 7 days a week     Pharmacy verified: Luis [facility]en      Date of last visit: 5/1/19  Date of next visit (if applicable): 2/8/9572    Patient is asking if he can  patches on Friday during the week instead of Saturday

## 2019-07-03 ENCOUNTER — HOSPITAL ENCOUNTER (OUTPATIENT)
Dept: CT IMAGING | Age: 84
Discharge: HOME | End: 2019-07-03
Payer: MEDICARE

## 2019-07-03 DIAGNOSIS — J41.1 CHRONIC BRONCHITIS WITH PRODUCTIVE MUCOPURULENT COUGH (HCC): ICD-10-CM

## 2019-07-03 DIAGNOSIS — J45.40 MODERATE PERSISTENT ASTHMA WITHOUT COMPLICATION: ICD-10-CM

## 2019-07-03 DIAGNOSIS — R94.2 DECREASED DIFFUSION CAPACITY OF LUNG: ICD-10-CM

## 2019-07-03 DIAGNOSIS — J98.4 RESTRICTIVE LUNG DISEASE: ICD-10-CM

## 2019-07-03 PROCEDURE — 71250 CT THORAX DX C-: CPT

## 2019-07-09 ENCOUNTER — NURSE ONLY (OUTPATIENT)
Dept: LAB | Age: 84
End: 2019-07-09

## 2019-07-09 ENCOUNTER — OFFICE VISIT (OUTPATIENT)
Dept: PULMONOLOGY | Age: 84
End: 2019-07-09
Payer: MEDICARE

## 2019-07-09 VITALS
HEIGHT: 68 IN | WEIGHT: 173 LBS | BODY MASS INDEX: 26.22 KG/M2 | DIASTOLIC BLOOD PRESSURE: 62 MMHG | HEART RATE: 81 BPM | SYSTOLIC BLOOD PRESSURE: 118 MMHG | OXYGEN SATURATION: 97 % | TEMPERATURE: 97 F

## 2019-07-09 DIAGNOSIS — J98.4 RESTRICTIVE LUNG DISEASE: ICD-10-CM

## 2019-07-09 DIAGNOSIS — R94.2 DECREASED DIFFUSION CAPACITY OF LUNG: ICD-10-CM

## 2019-07-09 DIAGNOSIS — I27.20 PULMONARY HYPERTENSION (HCC): ICD-10-CM

## 2019-07-09 DIAGNOSIS — J41.1 CHRONIC BRONCHITIS WITH PRODUCTIVE MUCOPURULENT COUGH (HCC): ICD-10-CM

## 2019-07-09 DIAGNOSIS — J47.9 BRONCHIECTASIS WITHOUT COMPLICATION (HCC): ICD-10-CM

## 2019-07-09 DIAGNOSIS — I48.20 CHRONIC ATRIAL FIBRILLATION (HCC): ICD-10-CM

## 2019-07-09 DIAGNOSIS — R06.3 CENTRAL SLEEP APNEA DUE TO CHEYNE-STOKES RESPIRATION: ICD-10-CM

## 2019-07-09 DIAGNOSIS — M41.9 KYPHOSCOLIOSIS: ICD-10-CM

## 2019-07-09 DIAGNOSIS — J45.40 MODERATE PERSISTENT ASTHMA WITHOUT COMPLICATION: ICD-10-CM

## 2019-07-09 DIAGNOSIS — J45.40 MODERATE PERSISTENT ASTHMA WITHOUT COMPLICATION: Primary | ICD-10-CM

## 2019-07-09 PROCEDURE — G8417 CALC BMI ABV UP PARAM F/U: HCPCS | Performed by: NURSE PRACTITIONER

## 2019-07-09 PROCEDURE — 4040F PNEUMOC VAC/ADMIN/RCVD: CPT | Performed by: NURSE PRACTITIONER

## 2019-07-09 PROCEDURE — 95012 NITRIC OXIDE EXP GAS DETER: CPT | Performed by: NURSE PRACTITIONER

## 2019-07-09 PROCEDURE — 1123F ACP DISCUSS/DSCN MKR DOCD: CPT | Performed by: NURSE PRACTITIONER

## 2019-07-09 PROCEDURE — G8427 DOCREV CUR MEDS BY ELIG CLIN: HCPCS | Performed by: NURSE PRACTITIONER

## 2019-07-09 PROCEDURE — 1036F TOBACCO NON-USER: CPT | Performed by: NURSE PRACTITIONER

## 2019-07-09 PROCEDURE — 99214 OFFICE O/P EST MOD 30 MIN: CPT | Performed by: NURSE PRACTITIONER

## 2019-07-09 ASSESSMENT — ENCOUNTER SYMPTOMS
SHORTNESS OF BREATH: 0
VOICE CHANGE: 1
VOMITING: 0
NAUSEA: 0
ABDOMINAL PAIN: 0
WHEEZING: 0
TROUBLE SWALLOWING: 0
EYES NEGATIVE: 1
DIARRHEA: 0
COUGH: 1

## 2019-07-09 NOTE — PROGRESS NOTES
daily       Omega-3 Fatty Acids (FISH OIL CONCENTRATE PO) Take by mouth daily       hydrocortisone (WESTCORT) 0.2 % cream Apply topically as needed Apply topically 2 times daily.  Cholecalciferol (VITAMIN D3) 2000 UNITS CAPS Take  by mouth daily.  aspirin 81 MG EC tablet Take 81 mg by mouth daily       Warfarin Sodium (COUMADIN PO) Take by mouth Followed by Dr. Kassie Galvez. 2.5 mg daily.  omeprazole (PRILOSEC) 20 MG capsule Take 20 mg by mouth daily.  multivitamin (THERAGRAN) per tablet Take 1 tablet by mouth daily.  vitamin B-12 (CYANOCOBALAMIN) 100 MCG tablet Take 500 mcg by mouth daily. No current facility-administered medications for this visit. Diony MONTGOMERY   Review of Systems   Constitutional: Negative for chills, fever and unexpected weight change. HENT: Positive for voice change (occ hoarseness ). Negative for trouble swallowing. Eyes: Negative. Respiratory: Positive for cough. Negative for shortness of breath and wheezing. Cardiovascular: Negative for chest pain, palpitations and leg swelling. Gastrointestinal: Negative for abdominal pain, diarrhea, nausea and vomiting. Genitourinary: Negative. Musculoskeletal: Negative. Skin: Negative. Neurological: Negative. Hematological: Does not bruise/bleed easily. Psychiatric/Behavioral: Negative for suicidal ideas. Physical exam   /62 (Site: Left Upper Arm, Position: Sitting, Cuff Size: Medium Adult)   Pulse 81   Temp 97 °F (36.1 °C) (Tympanic)   Ht 5' 8\" (1.727 m)   Wt 173 lb (78.5 kg)   SpO2 97% Comment: room air  BMI 26.30 kg/m²        Physical Exam   Constitutional: He is oriented to person, place, and time. He appears well-developed and well-nourished. No distress. HENT:   Mouth/Throat: No oropharyngeal exudate. Eyes: Conjunctivae are normal. Right eye exhibits no discharge. No scleral icterus. Neck: Neck supple. No JVD present.    Cardiovascular: Normal rate and regular

## 2019-07-12 LAB
GRAM STAIN RESULT: NORMAL
RESPIRATORY CULTURE: NORMAL

## 2019-07-15 ENCOUNTER — TELEPHONE (OUTPATIENT)
Dept: PULMONOLOGY | Age: 84
End: 2019-07-15

## 2019-07-16 ENCOUNTER — TELEPHONE (OUTPATIENT)
Dept: PULMONOLOGY | Age: 84
End: 2019-07-16

## 2019-07-16 NOTE — TELEPHONE ENCOUNTER
Patient called in stating he needs to see someone ASAP. He states that he is tired all the time, dizzy and just cannot function. He feels it is due to asthma medications. He wanted an appointment this week, but you do not have anything. He then requested an appointment with one of the physician's and advised him that they did not have any open appointments either. Could you please call the patient and then advise if he needs to be seen. He was just seen on July 9, 2019.   Patient can be reached at [phone]

## 2019-07-29 DIAGNOSIS — G89.4 CHRONIC PAIN SYNDROME: ICD-10-CM

## 2019-07-29 DIAGNOSIS — M54.17 LUMBOSACRAL RADICULITIS: ICD-10-CM

## 2019-07-29 NOTE — TELEPHONE ENCOUNTER
Prema Taylor called requesting a refill on the following medications:  Requested Prescriptions     Pending Prescriptions Disp Refills    buprenorphine (BUTRANS) 7.5 MCG/HR PTWK 4 patch 0     Sig: Place 1 patch onto the skin once a week for 28 days.  7 days a week     Pharmacy verified:  Lori Richards      Date of last visit: 5/1/19  Date of next visit (if applicable): 2/7/9542

## 2019-07-30 RX ORDER — BUPRENORPHINE 7.5 UG/H
1 PATCH TRANSDERMAL WEEKLY
Qty: 4 PATCH | Refills: 0 | Status: SHIPPED | OUTPATIENT
Start: 2019-08-02 | End: 2019-08-27 | Stop reason: SDUPTHER

## 2019-07-30 NOTE — TELEPHONE ENCOUNTER
OARRS reviewed. UDS: + for    Buprenorphine POSITIVE CONSISTENT  Norbuprenorphine POSITIVE CONSISTENT  Tramadol Not Detected   O-Desmethyltramadol POSITIVE CONSISTENT      Narcan offered: no       Last seen: 5/1/2019.      Follow-up:   Future Appointments   Date Time Provider Pancho Clark   8/1/2019  1:00 PM MEGHANN French - CNP [facility] Pain [facility] - [facility] [facility] [place]   8/13/2019  1:30 PM Michael Mcdonald, MEGHANN - [address] [facility]   [facility]6/2019 12:[facility] [facility] [address]   7/9/2020  1:30 PM Michael Mcdonald, [address] [facility]

## 2019-08-01 ENCOUNTER — OFFICE VISIT (OUTPATIENT)
Dept: PHYSICAL MEDICINE AND REHAB | Age: 84
End: 2019-08-01
Payer: MEDICARE

## 2019-08-01 VITALS
DIASTOLIC BLOOD PRESSURE: 60 MMHG | HEIGHT: 68 IN | HEART RATE: 70 BPM | WEIGHT: 171.96 LBS | BODY MASS INDEX: 26.06 KG/M2 | SYSTOLIC BLOOD PRESSURE: 112 MMHG

## 2019-08-01 DIAGNOSIS — G89.4 CHRONIC PAIN SYNDROME: ICD-10-CM

## 2019-08-01 DIAGNOSIS — M48.061 SPINAL STENOSIS OF LUMBAR REGION WITHOUT NEUROGENIC CLAUDICATION: ICD-10-CM

## 2019-08-01 DIAGNOSIS — M54.17 LUMBOSACRAL RADICULITIS: Primary | ICD-10-CM

## 2019-08-01 DIAGNOSIS — M53.3 SACROILIAC JOINT DYSFUNCTION: ICD-10-CM

## 2019-08-01 DIAGNOSIS — M47.816 SPONDYLOSIS OF LUMBAR REGION WITHOUT MYELOPATHY OR RADICULOPATHY: ICD-10-CM

## 2019-08-01 PROCEDURE — G8427 DOCREV CUR MEDS BY ELIG CLIN: HCPCS | Performed by: NURSE PRACTITIONER

## 2019-08-01 PROCEDURE — 1123F ACP DISCUSS/DSCN MKR DOCD: CPT | Performed by: NURSE PRACTITIONER

## 2019-08-01 PROCEDURE — 99213 OFFICE O/P EST LOW 20 MIN: CPT | Performed by: NURSE PRACTITIONER

## 2019-08-01 PROCEDURE — G8417 CALC BMI ABV UP PARAM F/U: HCPCS | Performed by: NURSE PRACTITIONER

## 2019-08-01 PROCEDURE — 1036F TOBACCO NON-USER: CPT | Performed by: NURSE PRACTITIONER

## 2019-08-01 PROCEDURE — 4040F PNEUMOC VAC/ADMIN/RCVD: CPT | Performed by: NURSE PRACTITIONER

## 2019-08-01 ASSESSMENT — ENCOUNTER SYMPTOMS
RHINORRHEA: 0
SORE THROAT: 0
VOMITING: 0
EYE PAIN: 0
NAUSEA: 0
CHEST TIGHTNESS: 0
SHORTNESS OF BREATH: 0
DIARRHEA: 0
COLOR CHANGE: 0
SINUS PRESSURE: 0
PHOTOPHOBIA: 0
COUGH: 0
ABDOMINAL PAIN: 0
WHEEZING: 0
BACK PAIN: 1
CONSTIPATION: 0

## 2019-08-01 NOTE — PROGRESS NOTES
addiction. · Previous UDS reviewed  · UDS preformed today for compliance. · Patient told can not receive any pain medications from any other source. · No evidence of abuse, diversion or aberrant behavior.  Medications and/or procedures to improve function and quality of life- patient understanding with this and that may not be pain free   Discussed with patient about safe storage of medications at home   Discussed possible weaning of medication dosing dependent on treatment/procedure results.  Testing: none   Procedures: May repeat LESI  discussed Left Si MBB left hip steroid injection or Lumbar Facet MBB    Discussed with patient about risks with procedure including infection, reaction to medication, increased pain, or bleeding.  Medications:Continue Butrans patch Tramadol po       Meds. Prescribed:   No orders of the defined types were placed in this encounter. Return in about 3 months (around 11/1/2019) for Follow up pain medications.          Electronically signed by MEGHANN Ruiz CNP on8/1/2019 at 10:11 AM

## 2019-08-13 ENCOUNTER — OFFICE VISIT (OUTPATIENT)
Dept: PULMONOLOGY | Age: 84
End: 2019-08-13
Payer: MEDICARE

## 2019-08-13 VITALS
BODY MASS INDEX: 26.37 KG/M2 | HEART RATE: 60 BPM | DIASTOLIC BLOOD PRESSURE: 61 MMHG | TEMPERATURE: 98.4 F | HEIGHT: 68 IN | WEIGHT: 174 LBS | SYSTOLIC BLOOD PRESSURE: 115 MMHG | OXYGEN SATURATION: 98 %

## 2019-08-13 DIAGNOSIS — J45.40 MODERATE PERSISTENT ASTHMA WITHOUT COMPLICATION: Primary | ICD-10-CM

## 2019-08-13 DIAGNOSIS — R53.83 OTHER FATIGUE: ICD-10-CM

## 2019-08-13 DIAGNOSIS — J98.4 RESTRICTIVE LUNG DISEASE: ICD-10-CM

## 2019-08-13 DIAGNOSIS — I27.20 PULMONARY HYPERTENSION (HCC): ICD-10-CM

## 2019-08-13 DIAGNOSIS — J47.9 BRONCHIECTASIS WITHOUT COMPLICATION (HCC): ICD-10-CM

## 2019-08-13 PROCEDURE — 1036F TOBACCO NON-USER: CPT | Performed by: NURSE PRACTITIONER

## 2019-08-13 PROCEDURE — G8417 CALC BMI ABV UP PARAM F/U: HCPCS | Performed by: NURSE PRACTITIONER

## 2019-08-13 PROCEDURE — 99214 OFFICE O/P EST MOD 30 MIN: CPT | Performed by: NURSE PRACTITIONER

## 2019-08-13 PROCEDURE — 1123F ACP DISCUSS/DSCN MKR DOCD: CPT | Performed by: NURSE PRACTITIONER

## 2019-08-13 PROCEDURE — 4040F PNEUMOC VAC/ADMIN/RCVD: CPT | Performed by: NURSE PRACTITIONER

## 2019-08-13 PROCEDURE — G8427 DOCREV CUR MEDS BY ELIG CLIN: HCPCS | Performed by: NURSE PRACTITIONER

## 2019-08-13 ASSESSMENT — ENCOUNTER SYMPTOMS
RHINORRHEA: 1
TROUBLE SWALLOWING: 0
WHEEZING: 0
VOICE CHANGE: 1
VOMITING: 0
EYES NEGATIVE: 1
SHORTNESS OF BREATH: 0
ABDOMINAL PAIN: 0
DIARRHEA: 0
NAUSEA: 0
ALLERGIC/IMMUNOLOGIC NEGATIVE: 1
COUGH: 0

## 2019-08-26 DIAGNOSIS — M54.17 LUMBOSACRAL RADICULITIS: ICD-10-CM

## 2019-08-26 DIAGNOSIS — G89.4 CHRONIC PAIN SYNDROME: ICD-10-CM

## 2019-08-27 RX ORDER — BUPRENORPHINE 7.5 UG/H
1 PATCH TRANSDERMAL WEEKLY
Qty: 4 PATCH | Refills: 0 | Status: SHIPPED | OUTPATIENT
Start: 2019-08-30 | End: 2019-09-24 | Stop reason: SDUPTHER

## 2019-09-24 DIAGNOSIS — G89.4 CHRONIC PAIN SYNDROME: ICD-10-CM

## 2019-09-24 DIAGNOSIS — M54.17 LUMBOSACRAL RADICULITIS: ICD-10-CM

## 2019-09-24 RX ORDER — BUPRENORPHINE 7.5 UG/H
1 PATCH TRANSDERMAL WEEKLY
Qty: 4 PATCH | Refills: 0 | Status: SHIPPED | OUTPATIENT
Start: 2019-09-24 | End: 2019-10-21 | Stop reason: SDUPTHER

## 2019-10-07 RX ORDER — FINASTERIDE 5 MG/1
5 TABLET, FILM COATED ORAL DAILY
Qty: 90 TABLET | Refills: 0 | Status: SHIPPED | OUTPATIENT
Start: 2019-10-07 | End: 2020-01-07

## 2019-10-21 DIAGNOSIS — M54.17 LUMBOSACRAL RADICULITIS: ICD-10-CM

## 2019-10-21 DIAGNOSIS — G89.4 CHRONIC PAIN SYNDROME: ICD-10-CM

## 2019-10-21 RX ORDER — BUPRENORPHINE 7.5 UG/H
1 PATCH TRANSDERMAL WEEKLY
Qty: 4 PATCH | Refills: 0 | Status: SHIPPED | OUTPATIENT
Start: 2019-10-26 | End: 2019-11-19 | Stop reason: SDUPTHER

## 2019-11-05 DIAGNOSIS — N40.1 BENIGN PROSTATIC HYPERPLASIA WITH LOWER URINARY TRACT SYMPTOMS, SYMPTOM DETAILS UNSPECIFIED: Primary | ICD-10-CM

## 2019-11-07 ENCOUNTER — OFFICE VISIT (OUTPATIENT)
Dept: PHYSICAL MEDICINE AND REHAB | Age: 84
End: 2019-11-07
Payer: MEDICARE

## 2019-11-07 VITALS
HEART RATE: 70 BPM | SYSTOLIC BLOOD PRESSURE: 90 MMHG | HEIGHT: 68 IN | BODY MASS INDEX: 26.36 KG/M2 | DIASTOLIC BLOOD PRESSURE: 62 MMHG | WEIGHT: 173.94 LBS

## 2019-11-07 DIAGNOSIS — M54.17 LUMBOSACRAL RADICULITIS: Primary | ICD-10-CM

## 2019-11-07 DIAGNOSIS — M47.816 SPONDYLOSIS OF LUMBAR REGION WITHOUT MYELOPATHY OR RADICULOPATHY: ICD-10-CM

## 2019-11-07 DIAGNOSIS — G89.4 CHRONIC PAIN SYNDROME: ICD-10-CM

## 2019-11-07 DIAGNOSIS — M48.061 SPINAL STENOSIS OF LUMBAR REGION WITHOUT NEUROGENIC CLAUDICATION: ICD-10-CM

## 2019-11-07 DIAGNOSIS — M53.3 SACROILIAC JOINT DYSFUNCTION: ICD-10-CM

## 2019-11-07 LAB — PROSTATE SPECIFIC ANTIGEN: 0.66 NG/ML

## 2019-11-07 PROCEDURE — 1036F TOBACCO NON-USER: CPT | Performed by: NURSE PRACTITIONER

## 2019-11-07 PROCEDURE — G8427 DOCREV CUR MEDS BY ELIG CLIN: HCPCS | Performed by: NURSE PRACTITIONER

## 2019-11-07 PROCEDURE — G8417 CALC BMI ABV UP PARAM F/U: HCPCS | Performed by: NURSE PRACTITIONER

## 2019-11-07 PROCEDURE — 4040F PNEUMOC VAC/ADMIN/RCVD: CPT | Performed by: NURSE PRACTITIONER

## 2019-11-07 PROCEDURE — G8484 FLU IMMUNIZE NO ADMIN: HCPCS | Performed by: NURSE PRACTITIONER

## 2019-11-07 PROCEDURE — 1123F ACP DISCUSS/DSCN MKR DOCD: CPT | Performed by: NURSE PRACTITIONER

## 2019-11-07 PROCEDURE — 99213 OFFICE O/P EST LOW 20 MIN: CPT | Performed by: NURSE PRACTITIONER

## 2019-11-07 ASSESSMENT — ENCOUNTER SYMPTOMS
SORE THROAT: 0
PHOTOPHOBIA: 0
NAUSEA: 0
RHINORRHEA: 0
SHORTNESS OF BREATH: 0
CHEST TIGHTNESS: 0
CONSTIPATION: 0
SINUS PRESSURE: 0
BACK PAIN: 1
COLOR CHANGE: 0
ABDOMINAL PAIN: 0
VOMITING: 0
COUGH: 0
EYE PAIN: 0
DIARRHEA: 0
WHEEZING: 0

## 2019-11-12 ENCOUNTER — OFFICE VISIT (OUTPATIENT)
Dept: UROLOGY | Age: 84
End: 2019-11-12
Payer: MEDICARE

## 2019-11-12 VITALS
BODY MASS INDEX: 26.07 KG/M2 | SYSTOLIC BLOOD PRESSURE: 128 MMHG | HEIGHT: 68 IN | WEIGHT: 172 LBS | DIASTOLIC BLOOD PRESSURE: 78 MMHG

## 2019-11-12 DIAGNOSIS — N40.1 HYPERTROPHY OF PROSTATE WITH URINARY OBSTRUCTION: Primary | ICD-10-CM

## 2019-11-12 DIAGNOSIS — R33.9 RETENTION OF URINE: ICD-10-CM

## 2019-11-12 DIAGNOSIS — R31.29 MICROSCOPIC HEMATURIA: ICD-10-CM

## 2019-11-12 DIAGNOSIS — N13.8 HYPERTROPHY OF PROSTATE WITH URINARY OBSTRUCTION: Primary | ICD-10-CM

## 2019-11-12 LAB
BACTERIA: ABNORMAL
BILIRUBIN URINE: NEGATIVE
BILIRUBIN URINE: NEGATIVE
BLOOD URINE, POC: NORMAL
BLOOD, URINE: ABNORMAL
CASTS: ABNORMAL /LPF
CASTS: ABNORMAL /LPF
CHARACTER, URINE: CLEAR
CHARACTER, URINE: CLEAR
COLOR, URINE: YELLOW
COLOR: YELLOW
CRYSTALS: ABNORMAL
EPITHELIAL CELLS, UA: ABNORMAL /HPF
GLUCOSE URINE: NEGATIVE MG/DL
GLUCOSE, URINE: NEGATIVE MG/DL
KETONES, URINE: NEGATIVE
KETONES, URINE: NEGATIVE
LEUKOCYTE CLUMPS, URINE: NEGATIVE
LEUKOCYTE ESTERASE, URINE: NEGATIVE
MISCELLANEOUS LAB TEST RESULT: ABNORMAL
NITRITE, URINE: NEGATIVE
NITRITE, URINE: NEGATIVE
PH UA: 5 (ref 5–9)
PH, URINE: 5 (ref 5–9)
POST VOID RESIDUAL (PVR): 297 ML
PROTEIN UA: NEGATIVE MG/DL
PROTEIN, URINE: NEGATIVE MG/DL
RBC URINE: ABNORMAL /HPF
RENAL EPITHELIAL, UA: ABNORMAL
SPECIFIC GRAVITY UA: 1.01 (ref 1–1.03)
SPECIFIC GRAVITY, URINE: 1.01 (ref 1–1.03)
UROBILINOGEN, URINE: 0.2 EU/DL (ref 0–1)
UROBILINOGEN, URINE: 0.2 EU/DL (ref 0–1)
WBC UA: ABNORMAL /HPF
YEAST: ABNORMAL

## 2019-11-12 PROCEDURE — 81003 URINALYSIS AUTO W/O SCOPE: CPT | Performed by: NURSE PRACTITIONER

## 2019-11-12 PROCEDURE — 1036F TOBACCO NON-USER: CPT | Performed by: NURSE PRACTITIONER

## 2019-11-12 PROCEDURE — 4040F PNEUMOC VAC/ADMIN/RCVD: CPT | Performed by: NURSE PRACTITIONER

## 2019-11-12 PROCEDURE — G8417 CALC BMI ABV UP PARAM F/U: HCPCS | Performed by: NURSE PRACTITIONER

## 2019-11-12 PROCEDURE — G8484 FLU IMMUNIZE NO ADMIN: HCPCS | Performed by: NURSE PRACTITIONER

## 2019-11-12 PROCEDURE — 1123F ACP DISCUSS/DSCN MKR DOCD: CPT | Performed by: NURSE PRACTITIONER

## 2019-11-12 PROCEDURE — G8427 DOCREV CUR MEDS BY ELIG CLIN: HCPCS | Performed by: NURSE PRACTITIONER

## 2019-11-12 PROCEDURE — 99213 OFFICE O/P EST LOW 20 MIN: CPT | Performed by: NURSE PRACTITIONER

## 2019-11-12 PROCEDURE — 51798 US URINE CAPACITY MEASURE: CPT | Performed by: NURSE PRACTITIONER

## 2019-11-12 RX ORDER — TAMSULOSIN HYDROCHLORIDE 0.4 MG/1
0.4 CAPSULE ORAL 2 TIMES DAILY
Qty: 180 CAPSULE | Refills: 3 | Status: SHIPPED | OUTPATIENT
Start: 2019-11-12 | End: 2020-11-30 | Stop reason: SDUPTHER

## 2019-11-17 DIAGNOSIS — M53.3 SACROILIAC JOINT DYSFUNCTION: ICD-10-CM

## 2019-11-17 DIAGNOSIS — M48.061 SPINAL STENOSIS OF LUMBAR REGION WITHOUT NEUROGENIC CLAUDICATION: ICD-10-CM

## 2019-11-17 DIAGNOSIS — G89.4 CHRONIC PAIN SYNDROME: ICD-10-CM

## 2019-11-17 DIAGNOSIS — M54.17 LUMBOSACRAL RADICULITIS: ICD-10-CM

## 2019-11-17 DIAGNOSIS — M47.816 SPONDYLOSIS OF LUMBAR REGION WITHOUT MYELOPATHY OR RADICULOPATHY: ICD-10-CM

## 2019-11-18 DIAGNOSIS — G89.4 CHRONIC PAIN SYNDROME: ICD-10-CM

## 2019-11-18 DIAGNOSIS — M54.17 LUMBOSACRAL RADICULITIS: ICD-10-CM

## 2019-11-18 RX ORDER — TRAMADOL HYDROCHLORIDE 50 MG/1
TABLET ORAL
Qty: 90 TABLET | Refills: 0 | OUTPATIENT
Start: 2019-11-18

## 2019-11-19 RX ORDER — TRAMADOL HYDROCHLORIDE 50 MG/1
50 TABLET ORAL EVERY 8 HOURS PRN
Qty: 90 TABLET | Refills: 0 | Status: SHIPPED | OUTPATIENT
Start: 2019-11-19 | End: 2019-12-19

## 2019-11-19 RX ORDER — BUPRENORPHINE 7.5 UG/H
1 PATCH TRANSDERMAL WEEKLY
Qty: 4 PATCH | Refills: 0 | Status: SHIPPED | OUTPATIENT
Start: 2019-11-25 | End: 2019-12-17 | Stop reason: SDUPTHER

## 2019-12-16 DIAGNOSIS — M54.17 LUMBOSACRAL RADICULITIS: ICD-10-CM

## 2019-12-16 DIAGNOSIS — G89.4 CHRONIC PAIN SYNDROME: ICD-10-CM

## 2019-12-17 RX ORDER — BUPRENORPHINE 7.5 UG/H
1 PATCH TRANSDERMAL WEEKLY
Qty: 4 PATCH | Refills: 0 | Status: SHIPPED | OUTPATIENT
Start: 2019-12-24 | End: 2020-01-17 | Stop reason: SDUPTHER

## 2020-01-17 RX ORDER — BUPRENORPHINE 7.5 UG/H
1 PATCH TRANSDERMAL WEEKLY
Qty: 4 PATCH | Refills: 0 | Status: SHIPPED | OUTPATIENT
Start: 2020-01-17 | End: 2020-02-13 | Stop reason: SDUPTHER

## 2020-01-17 NOTE — TELEPHONE ENCOUNTER
Luis Alfredo Kimble called requesting a refill on the following medications:  Requested Prescriptions     Pending Prescriptions Disp Refills    buprenorphine (BUTRANS) 7.5 MCG/HR PTWK 4 patch 0     Sig: Place 1 patch onto the skin once a week for 28 days. 7 days a week     Pharmacy verified:  [facility] [address]    Date of last visit: 11/7/19  Date of next visit (if applicable): 4/8/8744    PATIENT SAID HE SHOULD HAVE CALLED Monday BUT TOTALLY SLIPPED HIS MIND. IF CAN BE CALLED IN TODAY HE'D APPRECIATE.

## 2020-02-06 ENCOUNTER — OFFICE VISIT (OUTPATIENT)
Dept: PHYSICAL MEDICINE AND REHAB | Age: 85
End: 2020-02-06
Payer: MEDICARE

## 2020-02-06 VITALS
HEART RATE: 60 BPM | SYSTOLIC BLOOD PRESSURE: 118 MMHG | BODY MASS INDEX: 26.06 KG/M2 | DIASTOLIC BLOOD PRESSURE: 74 MMHG | WEIGHT: 171.96 LBS | HEIGHT: 68 IN

## 2020-02-06 PROCEDURE — G8484 FLU IMMUNIZE NO ADMIN: HCPCS | Performed by: NURSE PRACTITIONER

## 2020-02-06 PROCEDURE — 99213 OFFICE O/P EST LOW 20 MIN: CPT | Performed by: NURSE PRACTITIONER

## 2020-02-06 PROCEDURE — 1123F ACP DISCUSS/DSCN MKR DOCD: CPT | Performed by: NURSE PRACTITIONER

## 2020-02-06 PROCEDURE — G8417 CALC BMI ABV UP PARAM F/U: HCPCS | Performed by: NURSE PRACTITIONER

## 2020-02-06 PROCEDURE — 1036F TOBACCO NON-USER: CPT | Performed by: NURSE PRACTITIONER

## 2020-02-06 PROCEDURE — 4040F PNEUMOC VAC/ADMIN/RCVD: CPT | Performed by: NURSE PRACTITIONER

## 2020-02-06 PROCEDURE — G8427 DOCREV CUR MEDS BY ELIG CLIN: HCPCS | Performed by: NURSE PRACTITIONER

## 2020-02-06 RX ORDER — LIDOCAINE 50 MG/G
OINTMENT TOPICAL PRN
Qty: 3 TUBE | Refills: 2 | Status: SHIPPED | OUTPATIENT
Start: 2020-02-06 | End: 2020-05-07 | Stop reason: SDUPTHER

## 2020-02-06 RX ORDER — TRAMADOL HYDROCHLORIDE 50 MG/1
50 TABLET ORAL EVERY 8 HOURS PRN
Qty: 90 TABLET | Refills: 0 | Status: SHIPPED | OUTPATIENT
Start: 2020-02-06 | End: 2020-03-07

## 2020-02-06 ASSESSMENT — ENCOUNTER SYMPTOMS
VOMITING: 0
SHORTNESS OF BREATH: 0
NAUSEA: 0
CONSTIPATION: 0
COUGH: 0
ABDOMINAL PAIN: 0
DIARRHEA: 0
BACK PAIN: 1
WHEEZING: 0
CHEST TIGHTNESS: 0
PHOTOPHOBIA: 0
SINUS PRESSURE: 0
EYE PAIN: 0
RHINORRHEA: 0
SORE THROAT: 0
COLOR CHANGE: 0

## 2020-02-06 NOTE — PROGRESS NOTES
[facility]  [address]  Dept: [phone]  Dept Fax: [phone]: [phone]    Visit Date: 2/6/2020    Functionality Assessment/Goals Worksheet     On a scale of 0 (Does not Interfere) to 10 (Completely Interferes)     1. Which number describes how during the past week pain has interfered with       the following:  A. General Activity:  6  B. Mood: 4  C. Walking Ability:  6  D. Normal Work (Includes both work outside the home and housework):  5  E. Relations with Other People:   5  F. Sleep:   6  G. Enjoyment of Life:   5    2. Patient Prefers to Take their Pain Medications:     [x]  On a regular basis   [x]  Only when necessary    []  Does not take pain medications    3. What are the Patient's Goals/Expectations for Visiting Pain Management? []  Learn about my pain    [x]  Receive Medication   []  Physical Therapy     []  Treat Depression   []  Receive Injections    []  Treat Sleep   []  Deal with Anxiety and Stress   []  Treat Opoid Dependence/Addiction   []  Other:    HPI:   Melly Marin is a 80 y.o. male is here today for    Chief Complaint: Low back pain, Left leg pain and Hip pain    HPI   F/U continues to have ow back SI  LLE pain. He had LESI L5 left in 2018 and had 50% pain relief or benefit to LLE for 12-18 months and no relief of back pain. Discussed Lumbar Facet MBB or SI MBB he has refused. He continues to take Tramadol and Butrans patch. He also uses lidocaine gel. He uses a cane, his low back pain increases with long term standing walking twisting turning torso. Medications reviewed. Patient denies side effects with medications. Patient states he is taking medications as prescribed. Hedenies receiving pain medications from other sources. He denies any ER visits since last visit.     Pain scale with out pain medications or at its worst is 5/10.  Pain scale with pain medications or at its best is 3/10. Last dose of Tramadol  was today Butrans patch on [facility]  Drug screen reviewed from 11/7/2019 and was appropriate  Pill count was not completed today and patient was educated on bringing in medications  Patient does not have naloxone available at home. Patient has not required use of naloxone at home since last office visit. The patienthas No Known Allergies. Past Medical History  Chris Alegria  has a past medical history of Atrial fibrillation ([facility] [address] Bacterial pneumonia, CAD (coronary artery disease), Chronic kidney disease, Collar bone fracture, Diabetes mellitus ([address] Enlarged prostate, Hypertension, Mild acid reflux, Pancreatitis, gallstone, Sleep apnea, and UTI (urinary tract infection). Past Surgical History  The patient  has a past surgical history that includes hernia repair; Tonsillectomy and adenoidectomy; Cholecystectomy; Endoscopy, colon, diagnostic; Colonoscopy (01/12/2011,10/09/2015); Cardiac surgery; skin biopsy; Cataract removal; TURP (01/04/2010); hernia repair (Right, 03/12/2010); pacemaker placement (3/23/15); and pr njx dx/ther sbst intrlmnr lmbr/sac w/img gdn (Right, 10/29/2018). Family History  This patient's family history includes Cancer in his father; Diabetes in his mother; Heart Disease in his father; High Blood Pressure in his mother. Social History  Chris Alegria  reports that he has never smoked. He has never used smokeless tobacco. He reports current alcohol use. He reports that he does not use drugs. Medications    Current Outpatient Medications:     lidocaine (XYLOCAINE) 5 % ointment, Apply topically as needed for Pain Apply topically as needed. , Disp: 3 Tube, Rfl: 2    traMADol (ULTRAM) 50 MG tablet, Take 1 tablet by mouth every 8 hours as needed for Pain for up to 30 days. , Disp: 90 tablet, Rfl: 0    buprenorphine (BUTRANS) 7.5 MCG/HR PTWK, Place 1 patch onto the skin once a week for 28 days.  7 days a week, Disp: 4 patch, Rfl: 0    finasteride (PROSCAR) 5 MG tablet, TAKE 1 TABLET BY MOUTH DAILY, Disp: 90 tablet, Rfl: 3    tamsulosin (FLOMAX) 0.4 MG capsule, Take 1 capsule by mouth 2 times daily, Disp: 180 capsule, Rfl: 3    fluticasone (ARNUITY ELLIPTA) 100 MCG/ACT AEPB, Inhale 100 mcg into the lungs daily, Disp: 30 each, Rfl: 11    amiodarone (PACERONE) 100 MG tablet, Take 100 mg by mouth daily, Disp: , Rfl:     Ascorbic Acid (VITAMIN C) 1000 MG tablet, Take 1,000 mg by mouth daily, Disp: , Rfl:     Calcium Carb-Cholecalciferol (CALCIUM/VITAMIN D PO), Take by mouth daily 630-500, Disp: , Rfl:     carvedilol (COREG) 6.25 MG tablet, Take 6.25 mg by mouth daily , Disp: , Rfl:     metFORMIN (GLUCOPHAGE) 500 MG tablet, Take 500 mg by mouth daily (with breakfast) , Disp: , Rfl:     sacubitril-valsartan (ENTRESTO) 24-26 MG per tablet, Take 1 tablet by mouth 2 times daily, Disp: , Rfl:     triamcinolone (KENALOG) 0.025 % LOTN lotion, Apply topically as needed , Disp: , Rfl:     albuterol sulfate HFA (PROAIR HFA) 108 (90 Base) MCG/ACT inhaler, Inhale 2 puffs into the lungs every 6 hours as needed for Wheezing, Disp: 1 Inhaler, Rfl: 11    furosemide (LASIX) 20 MG tablet, Take 40 mg by mouth daily , Disp: , Rfl:     potassium chloride (KLOR-CON) 20 MEQ packet, Take 20 mEq by mouth daily , Disp: , Rfl:     Omega-3 Fatty Acids (FISH OIL CONCENTRATE PO), Take by mouth daily , Disp: , Rfl:     hydrocortisone (WESTCORT) 0.2 % cream, Apply topically as needed Apply topically 2 times daily. , Disp: , Rfl:     Cholecalciferol (VITAMIN D3) 2000 UNITS CAPS, Take  by mouth daily. , Disp: , Rfl:     Warfarin Sodium (COUMADIN PO), Take by mouth Followed by Dr. Demetra Guzman. 2.5 mg daily. , Disp: , Rfl:     omeprazole (PRILOSEC) 20 MG capsule, Take 20 mg by mouth daily. , Disp: , Rfl:     multivitamin (THERAGRAN) per tablet, Take 1 tablet by mouth daily.   , Disp: , Rfl:     vitamin B-12 (CYANOCOBALAMIN) 100 MCG tablet, Take 500 mcg by mouth daily. , Disp: , Rfl:     Subjective:      Review of Systems   Constitutional: Positive for activity change. Negative for appetite change, chills, diaphoresis, fatigue, fever and unexpected weight change. HENT: Negative for congestion, ear pain, hearing loss, mouth sores, nosebleeds, rhinorrhea, sinus pressure and sore throat. Eyes: Negative for photophobia, pain and visual disturbance. Respiratory: Negative for cough, chest tightness, shortness of breath and wheezing. PATRICIA has ASTHMA   Cardiovascular: Negative for chest pain and palpitations. 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 GERD   Endocrine: Negative for cold intolerance, heat intolerance, polydipsia, polyphagia and polyuria. DM   Genitourinary: Negative for decreased urine volume, difficulty urinating, frequency and hematuria. Prostate issues   Musculoskeletal: Positive for arthralgias, back pain, gait problem, joint swelling, myalgias, neck pain and neck stiffness. Skin: Positive for wound. Negative for color change and rash. Right elbow carpet burn skin tear, dry dressing intact   Allergic/Immunologic: Negative for food allergies and immunocompromised state. Neurological: Positive for weakness and numbness. Negative for dizziness, tremors, seizures, syncope, facial asymmetry, speech difficulty, light-headedness and headaches. Hematological: Does not bruise/bleed easily. Psychiatric/Behavioral: Negative for agitation, behavioral problems, confusion, decreased concentration, dysphoric mood, hallucinations, self-injury, sleep disturbance and suicidal ideas. The patient is not nervous/anxious and is not hyperactive.         Objective:     Vitals:    02/06/20 1152   BP: 118/74   Site: Left Upper Arm   Position: Sitting   Cuff Size: Large Adult   Pulse: 60   Weight: 171 lb 15.3 oz (78 kg)   Height: 5' 7.99\" (1.727 m) paranoid or delusional. Thought content does not include homicidal or suicidal ideation. Thought content does not include homicidal or suicidal plan. Cognition and Memory: Cognition normal. Memory is not impaired. He does not exhibit impaired recent memory or impaired remote memory. Judgment: Judgment normal. Judgment is not impulsive or inappropriate. IONA test:+  Yeomans test: +  Gaenslen test: +     Assessment:     1. Lumbosacral radiculitis    2. Spondylosis of lumbar region without myelopathy or radiculopathy    3. Spinal stenosis of lumbar region without neurogenic claudication    4. Sacroiliac joint dysfunction    5. Chronic pain syndrome            Plan:      · OARRS reviewed. Current MED 16  · Patient was not offered naloxone for home. · Discussed long term side effects of medications, tolerance, dependency and addiction. · Previous UDS reviewed  · UDS preformed today for compliance. · Patient told can not receive any pain medications from any other source. · No evidence of abuse, diversion or aberrant behavior.  Medications and/or procedures to improve function and quality of life- patient understanding with this and that may not be pain free   Discussed with patient about safe storage of medications at home   Discussed possible weaning of medication dosing dependent on treatment/procedure results.  Testing:none    Procedures: refuses anymore   Discussed with patient about risks with procedure including infection, reaction to medication, increased pain, or bleeding.  Medications:Tramadol Butrans patch  Lidocaine      Meds. Prescribed:   Orders Placed This Encounter   Medications    lidocaine (XYLOCAINE) 5 % ointment     Sig: Apply topically as needed for Pain Apply topically as needed. Dispense:  3 Tube     Refill:  2    traMADol (ULTRAM) 50 MG tablet     Sig: Take 1 tablet by mouth every 8 hours as needed for Pain for up to 30 days.      Dispense:  90 tablet Refill:  0     Reduce doses taken as pain becomes manageable       Return in about 3 months (around 5/6/2020) for Follow up pain medications.          Electronically signed by MEGHANN Myrick CNP on2/6/2020 at 12:13 PM

## 2020-02-12 NOTE — TELEPHONE ENCOUNTER
Nate Santiago called requesting a refill on the following medications:  Requested Prescriptions     Pending Prescriptions Disp Refills    buprenorphine (BUTRANS) 7.5 MCG/HR PTWK 4 patch 0     Sig: Place 1 patch onto the skin once a week for 28 days. 7 days a week     Pharmacy verified: Justin Miranda on [address] [place] [address] Drive, [address]  . pv      Date of last visit: 2/06/20  Date of next visit (if applicable): 3/5/8491

## 2020-02-13 RX ORDER — BUPRENORPHINE 7.5 UG/H
1 PATCH TRANSDERMAL WEEKLY
Qty: 4 PATCH | Refills: 0 | Status: SHIPPED | OUTPATIENT
Start: 2020-02-17 | End: 2020-03-11 | Stop reason: SDUPTHER

## 2020-02-13 NOTE — TELEPHONE ENCOUNTER
OARRS reviewed. UDS: + for  Tramadol, butrans - consistent       Narcan offered: no       Last seen: 2/6/2020.        Follow-up:   Future Appointments   Date Time Provider Pancho Clark   5/7/2020 12:00 PM MEGHANN Torrez - CNP SRPX Pain [facility] - [address] [facility]   7/9/2020  1:30 PM MEGHANN Jimenez - [address] [facility]   11/19/2020  1:00 PM Brittanie Vega Urology [facility] - [address] [facility]

## 2020-03-09 NOTE — TELEPHONE ENCOUNTER
Laina Hurt called requesting a refill on the following medications:  Requested Prescriptions     Pending Prescriptions Disp Refills    buprenorphine (BUTRANS) 7.5 MCG/HR PTWK 4 patch 0     Sig: Place 1 patch onto the skin once a week for 28 days.  7 days a week     Pharmacy verified:  [facility]    Date of last visit: 02-06-20  Date of next visit (if applicable): 7/6/0030

## 2020-03-11 RX ORDER — BUPRENORPHINE 7.5 UG/H
1 PATCH TRANSDERMAL WEEKLY
Qty: 4 PATCH | Refills: 0 | Status: SHIPPED | OUTPATIENT
Start: 2020-03-16 | End: 2020-04-07 | Stop reason: SDUPTHER

## 2020-04-07 RX ORDER — BUPRENORPHINE 7.5 UG/H
1 PATCH TRANSDERMAL WEEKLY
Qty: 4 PATCH | Refills: 0 | Status: SHIPPED | OUTPATIENT
Start: 2020-04-11 | End: 2020-05-07 | Stop reason: SDUPTHER

## 2020-04-07 NOTE — TELEPHONE ENCOUNTER
Alicia Yi called requesting a refill on the following medications:  Requested Prescriptions     Pending Prescriptions Disp Refills    buprenorphine (BUTRANS) 7.5 MCG/HR PTWK 4 patch 0     Sig: Place 1 patch onto the skin once a week for 28 days.  7 days a week     Pharmacy verified:  [facility] [employer] [facility] [employer]      Date of last visit: 2/6/20  Date of next visit (if applicable): 5/8/4352

## 2020-04-10 NOTE — TELEPHONE ENCOUNTER
Requested Prescriptions     Pending Prescriptions Disp Refills    ARNUITY ELLIPTA 100 MCG/ACT AEPB [Pharmacy Med Name: ARNUITY ELLIPTA 100MCG ORAL INH 30] 30 each 11     Sig: INHALE 1 PUFF INTO THE LUNGS EVERY DAY     Please check the amount Brittny.       LOV 8-13-19    Next OV 7-9-20

## 2020-04-13 RX ORDER — FLUTICASONE FUROATE 100 UG/1
POWDER RESPIRATORY (INHALATION)
Qty: 30 EACH | Refills: 11 | Status: SHIPPED | OUTPATIENT
Start: 2020-04-13 | End: 2020-10-06 | Stop reason: HOSPADM

## 2020-05-04 RX ORDER — BUPRENORPHINE 7.5 UG/H
1 PATCH TRANSDERMAL WEEKLY
Qty: 4 PATCH | Refills: 0 | Status: CANCELLED | OUTPATIENT
Start: 2020-05-04 | End: 2020-06-01

## 2020-05-07 ENCOUNTER — VIRTUAL VISIT (OUTPATIENT)
Dept: PHYSICAL MEDICINE AND REHAB | Age: 85
End: 2020-05-07

## 2020-05-07 PROCEDURE — 99441 PR PHYS/QHP TELEPHONE EVALUATION 5-10 MIN: CPT | Performed by: NURSE PRACTITIONER

## 2020-05-07 RX ORDER — BUPRENORPHINE 7.5 UG/H
1 PATCH TRANSDERMAL WEEKLY
Qty: 4 PATCH | Refills: 0 | Status: SHIPPED | OUTPATIENT
Start: 2020-05-07 | End: 2020-06-05 | Stop reason: SDUPTHER

## 2020-05-07 RX ORDER — GABAPENTIN 300 MG/1
300 CAPSULE ORAL NIGHTLY
Qty: 30 CAPSULE | Refills: 1 | Status: SHIPPED | OUTPATIENT
Start: 2020-05-07 | End: 2022-10-24 | Stop reason: SDUPTHER

## 2020-05-07 RX ORDER — TRAMADOL HYDROCHLORIDE 50 MG/1
50 TABLET ORAL EVERY 6 HOURS PRN
Qty: 120 TABLET | Refills: 0 | Status: SHIPPED | OUTPATIENT
Start: 2020-05-07 | End: 2020-06-08 | Stop reason: SDUPTHER

## 2020-05-07 RX ORDER — LIDOCAINE 50 MG/G
OINTMENT TOPICAL PRN
Qty: 3 TUBE | Refills: 2 | Status: SHIPPED | OUTPATIENT
Start: 2020-05-07 | End: 2020-10-27 | Stop reason: SDUPTHER

## 2020-06-08 RX ORDER — BUPRENORPHINE 7.5 UG/H
PATCH TRANSDERMAL
Qty: 4 PATCH | OUTPATIENT
Start: 2020-06-08

## 2020-06-08 RX ORDER — TRAMADOL HYDROCHLORIDE 50 MG/1
50 TABLET ORAL EVERY 6 HOURS PRN
Qty: 120 TABLET | Refills: 0 | Status: SHIPPED | OUTPATIENT
Start: 2020-06-08 | End: 2020-07-08

## 2020-06-08 RX ORDER — TRAMADOL HYDROCHLORIDE 50 MG/1
TABLET ORAL
Qty: 120 TABLET | OUTPATIENT
Start: 2020-06-08

## 2020-06-08 RX ORDER — BUPRENORPHINE 7.5 UG/H
1 PATCH TRANSDERMAL WEEKLY
Qty: 4 PATCH | Refills: 0 | Status: SHIPPED | OUTPATIENT
Start: 2020-06-08 | End: 2020-06-30 | Stop reason: SDUPTHER

## 2020-06-08 NOTE — TELEPHONE ENCOUNTER
OARRS reviewed.  UDS: + for  Tramadol, butrans - consistent     Narcan offered: no     Last seen: 2/6/2020. - vv 05/07/2020    Follow-up:   Future Appointments   Date Time Provider Pancho Clark   7/9/2020  1:30 PM MEGHANN Munoz [facility] [address]   8/10/2020 12:30 PM MEGHANN Howell - CNP [facility] [address]   11/19/2020  1:00 PM Brittanie Becerra [facility] [address]

## 2020-06-30 RX ORDER — BUPRENORPHINE 7.5 UG/H
1 PATCH TRANSDERMAL WEEKLY
Qty: 4 PATCH | Refills: 0 | Status: SHIPPED | OUTPATIENT
Start: 2020-07-08 | End: 2020-07-30 | Stop reason: SDUPTHER

## 2020-06-30 NOTE — TELEPHONE ENCOUNTER
OARRS reviewed. UDS: + for  Tramadol, Butrans consistent. Narcan offered: offered  Last seen: 5/7/2020.  Follow-up: 08*/10/2020

## 2020-07-14 ENCOUNTER — OFFICE VISIT (OUTPATIENT)
Dept: PULMONOLOGY | Age: 85
End: 2020-07-14
Payer: MEDICARE

## 2020-07-14 VITALS
OXYGEN SATURATION: 99 % | BODY MASS INDEX: 27.31 KG/M2 | SYSTOLIC BLOOD PRESSURE: 116 MMHG | TEMPERATURE: 96.4 F | WEIGHT: 174 LBS | DIASTOLIC BLOOD PRESSURE: 62 MMHG | HEART RATE: 64 BPM | HEIGHT: 67 IN

## 2020-07-14 PROCEDURE — 95012 NITRIC OXIDE EXP GAS DETER: CPT | Performed by: NURSE PRACTITIONER

## 2020-07-14 PROCEDURE — G8427 DOCREV CUR MEDS BY ELIG CLIN: HCPCS | Performed by: NURSE PRACTITIONER

## 2020-07-14 PROCEDURE — 4040F PNEUMOC VAC/ADMIN/RCVD: CPT | Performed by: NURSE PRACTITIONER

## 2020-07-14 PROCEDURE — 1123F ACP DISCUSS/DSCN MKR DOCD: CPT | Performed by: NURSE PRACTITIONER

## 2020-07-14 PROCEDURE — G8926 SPIRO NO PERF OR DOC: HCPCS | Performed by: NURSE PRACTITIONER

## 2020-07-14 PROCEDURE — 99213 OFFICE O/P EST LOW 20 MIN: CPT | Performed by: NURSE PRACTITIONER

## 2020-07-14 PROCEDURE — 3023F SPIROM DOC REV: CPT | Performed by: NURSE PRACTITIONER

## 2020-07-14 PROCEDURE — G8417 CALC BMI ABV UP PARAM F/U: HCPCS | Performed by: NURSE PRACTITIONER

## 2020-07-14 PROCEDURE — 1036F TOBACCO NON-USER: CPT | Performed by: NURSE PRACTITIONER

## 2020-07-14 RX ORDER — ASPIRIN 81 MG/1
81 TABLET ORAL DAILY
COMMUNITY
End: 2020-11-19

## 2020-07-14 RX ORDER — TRAMADOL HYDROCHLORIDE 50 MG/1
50 TABLET ORAL EVERY 6 HOURS PRN
COMMUNITY
End: 2020-10-26 | Stop reason: SDUPTHER

## 2020-07-14 RX ORDER — MONTELUKAST SODIUM 10 MG/1
10 TABLET ORAL NIGHTLY
Qty: 30 TABLET | Refills: 3 | Status: SHIPPED | OUTPATIENT
Start: 2020-07-14 | End: 2020-07-14

## 2020-07-14 RX ORDER — ALBUTEROL SULFATE 90 UG/1
2 AEROSOL, METERED RESPIRATORY (INHALATION) EVERY 6 HOURS PRN
Qty: 1 INHALER | Refills: 11 | Status: SHIPPED | OUTPATIENT
Start: 2020-07-14 | End: 2020-07-14

## 2020-07-14 ASSESSMENT — ENCOUNTER SYMPTOMS
EYES NEGATIVE: 1
SHORTNESS OF BREATH: 0
NAUSEA: 0
EYE DISCHARGE: 0
COUGH: 1
VOMITING: 0
DIARRHEA: 0
ABDOMINAL PAIN: 0
WHEEZING: 0
CHEST TIGHTNESS: 0

## 2020-07-14 NOTE — PROGRESS NOTES
[facility]     Patient: Luiz Ponce, 80 y.o.   : 1934    Pt of Samantha Decker CNP      Subjective     Chief Complaint   Patient presents with    Follow-up     asthma 1 year pulm follow up with no testing        HPI  Marjorie Ma is here for 1 year  follow up for asthma. In the past 1 week has had intermittent SOB, requiring use of his rescue inhaler. Does get benefit from inhaler. Cough has mostly been productive of thick yellow sputum. Worse in mornings or after taking a nap. No hemoptysis. Denies seasonal allergies, denies sinus headache or pressure. Afebrile. Denies any COVID exposures, or recent travel  Using his Arnuity 100 mcg daily daily and rinsing with use. Had pneumovax 2016 from PCP and gets yrly influenza shot  PMH CSA , he stopped wearing his PAP about 1 year ago, did not see benefit.   Follows with Mari Portillo PA-C   Past Medical hx   PMH:  Past Medical History:   Diagnosis Date    Atrial fibrillation ([address]     Bacterial pneumonia 2014    CAD (coronary artery disease)     mitral valve repair only    Chronic kidney disease     Collar bone fracture 1949    right side    Diabetes mellitus ([address]     Enlarged prostate Early     Hypertension 2013    Mild acid reflux 2013    Pancreatitis, gallstone     Sleep apnea     doesn't wear CPAP    UTI (urinary tract infection)      SURGICAL HISTORY:  Past Surgical History:   Procedure Laterality Date    CARDIAC SURGERY      mitral valve repair    CATARACT REMOVAL      CHOLECYSTECTOMY      COLONOSCOPY  2011,10/09/2015    ENDOSCOPY, COLON, DIAGNOSTIC      HERNIA REPAIR      HERNIA REPAIR Right 2010   Norma Jordan PACEMAKER PLACEMENT  3/23/15    [facility] [id number]    GA Yasmany Angelo [address] DX/THER SBST INTRLMNR LMBR/SAC W/IMG GDN Right 10/29/2018    LESI  L5 RIGHT SIDE performed by Slava Joe MD at [facility] negative    TONSILLECTOMY AND ADENOIDECTOMY      TURP  01/04/2010     SOCIAL HISTORY:  Social History     Tobacco Use    Smoking status: Never Smoker    Smokeless tobacco: Never Used    Tobacco comment: never   Substance Use Topics    Alcohol use: Yes     Alcohol/week: 0.0 standard drinks     Types: 1 Glasses of wine per week     Comment: very little. right now none.  Drug use: No     ALLERGIES:No Known Allergies  FAMILY HISTORY:  Family History   Problem Relation Age of Onset    Diabetes Mother     High Blood Pressure Mother     Cancer Father     Heart Disease Father     Stroke Neg Hx      CURRENT MEDICATIONS:  Current Outpatient Medications   Medication Sig Dispense Refill    aspirin 81 MG EC tablet Take 81 mg by mouth daily      traMADol (ULTRAM) 50 MG tablet Take 50 mg by mouth every 6 hours as needed for Pain.  montelukast (SINGULAIR) 10 MG tablet Take 1 tablet by mouth nightly 30 tablet 3    albuterol sulfate HFA (PROAIR HFA) 108 (90 Base) MCG/ACT inhaler Inhale 2 puffs into the lungs every 6 hours as needed for Wheezing 1 Inhaler 11    buprenorphine (BUTRANS) 7.5 MCG/HR PTWK Place 1 patch onto the skin once a week for 28 days. 7 days a week 4 patch 0    lidocaine (XYLOCAINE) 5 % ointment Apply topically as needed for Pain Apply topically as needed.  3 Tube 2    ARNUITY ELLIPTA 100 MCG/ACT AEPB INHALE 1 PUFF INTO THE LUNGS EVERY DAY 30 each 11    finasteride (PROSCAR) 5 MG tablet TAKE 1 TABLET BY MOUTH DAILY 90 tablet 3    tamsulosin (FLOMAX) 0.4 MG capsule Take 1 capsule by mouth 2 times daily 180 capsule 3    amiodarone (PACERONE) 100 MG tablet Take 100 mg by mouth daily      Calcium Carb-Cholecalciferol (CALCIUM/VITAMIN D PO) Take by mouth daily 630-500      carvedilol (COREG) 6.25 MG tablet Take 6.25 mg by mouth daily       metFORMIN (GLUCOPHAGE) 500 MG tablet Take 500 mg by mouth daily (with breakfast)       sacubitril-valsartan (ENTRESTO) 24-26 MG per tablet Take 1 tablet by mouth 2 times daily      triamcinolone (KENALOG) 0.025 % LOTN lotion Apply topically as needed       furosemide (LASIX) 20 MG tablet Take 40 mg by mouth daily       potassium chloride (KLOR-CON) 20 MEQ packet Take 20 mEq by mouth daily       Omega-3 Fatty Acids (FISH OIL CONCENTRATE PO) Take by mouth daily       hydrocortisone (WESTCORT) 0.2 % cream Apply topically as needed Apply topically 2 times daily.  Cholecalciferol (VITAMIN D3) 2000 UNITS CAPS Take  by mouth daily.  Warfarin Sodium (COUMADIN PO) Take by mouth Followed by Dr. Edgar Armstrong 2.5 mg daily.  omeprazole (PRILOSEC) 20 MG capsule Take 20 mg by mouth daily.  multivitamin (THERAGRAN) per tablet Take 1 tablet by mouth daily.  vitamin B-12 (CYANOCOBALAMIN) 100 MCG tablet Take 500 mcg by mouth daily.  gabapentin (NEURONTIN) 300 MG capsule Take 1 capsule by mouth nightly for 30 days. 30 capsule 1     No current facility-administered medications for this visit. Jaye MONTGOMERY   Review of Systems   Constitutional: Negative for activity change, chills and fever. HENT: Positive for sneezing. Eyes: Negative. Negative for discharge. Respiratory: Positive for cough. Negative for chest tightness, shortness of breath and wheezing. Cardiovascular: Negative for chest pain, palpitations and leg swelling. Gastrointestinal: Negative for abdominal pain, diarrhea, nausea and vomiting. Genitourinary: Negative. Musculoskeletal: Negative. Skin: Negative. Neurological: Negative. Hematological: Does not bruise/bleed easily. Psychiatric/Behavioral: Negative for sleep disturbance and suicidal ideas. Physical exam   /62 (Site: Left Upper Arm, Position: Sitting, Cuff Size: Medium Adult)   Pulse 64   Temp 96.4 °F (35.8 °C)   Ht 5' 7\" (1.702 m)   Wt 174 lb (78.9 kg)   SpO2 99% Comment: on ra  BMI 27.25 kg/m²        Physical Exam  Vitals signs and nursing note reviewed.    Constitutional: General: He is not in acute distress. Appearance: He is well-developed. HENT:      Mouth/Throat:      Lips: Pink. Mouth: Mucous membranes are moist.      Pharynx: Oropharynx is clear. No oropharyngeal exudate or posterior oropharyngeal erythema. Eyes:      Conjunctiva/sclera: Conjunctivae normal.   Neck:      Vascular: No JVD. Cardiovascular:      Rate and Rhythm: Normal rate and regular rhythm. Heart sounds: No murmur. No friction rub. Pulmonary:      Effort: Pulmonary effort is normal. No accessory muscle usage or respiratory distress. Breath sounds: Normal breath sounds. No wheezing, rhonchi or rales. Chest:      Chest wall: No tenderness. Musculoskeletal:      Right lower leg: No edema. Left lower leg: No edema. Skin:     General: Skin is warm and dry. Capillary Refill: Capillary refill takes less than 2 seconds. Nails: There is no clubbing. Neurological:      Mental Status: He is alert. Psychiatric:         Mood and Affect: Mood normal.         Behavior: Behavior normal.         Thought Content: Thought content normal.         Judgment: Judgment normal.          Test results   Lung Nodule Screening     [] Qualifies    [x]Does not qualify   [] Declined    [] Completed          FENO level today of 22, c/w controlled Th2 inflammation on his Arnuity, in fact his FENO level is the lowest is has ever been. Electronically signed by MEGHANN Rod CNP on 7/14/2020   Assessment      Diagnosis Orders   1. Moderate persistent asthma without complication  POCT Nitric Oxide   2. Cough in adult  POCT Nitric Oxide   3.  Chronic bronchitis, unspecified chronic bronchitis type (HCC)  albuterol sulfate HFA (PROAIR HFA) 108 (90 Base) MCG/ACT inhaler      Plan   -continue Arnuity 100 mcg  -cough, likely due to sinus drainage, possible allergy component, will start Singulair 10 mg PO daily at bedtime.   -continue PRN albuterol sulfate  -avoidance of ill contacts  -continue yearly influenza vaccine in fall     Will see Daniel Garza in: 2 months for med check     Electronically signed by MEGHANN Cannon CNP on 7/14/2020 at 1:51 PM

## 2020-07-29 NOTE — TELEPHONE ENCOUNTER
Daniela Woodard called requesting a refill on the following medications:  Requested Prescriptions     Pending Prescriptions Disp Refills    buprenorphine (BUTRANS) 7.5 MCG/HR PTWK 4 patch 0     Sig: Place 1 patch onto the skin once a week for 28 days.  7 days a week     Pharmacy verified:  [facility] [address]    Date of last visit: 05/07/2020  Date of next visit (if applicable): 5/34/0136

## 2020-07-30 RX ORDER — BUPRENORPHINE 7.5 UG/H
1 PATCH TRANSDERMAL WEEKLY
Qty: 4 PATCH | Refills: 0 | Status: SHIPPED | OUTPATIENT
Start: 2020-08-02 | End: 2020-08-25 | Stop reason: SDUPTHER

## 2020-07-30 NOTE — TELEPHONE ENCOUNTER
OARRS reviewed. UDS: + for  Tramadol and buprenorphine consistent. Narcan offered: yes  Last seen: 5/7/2020.  Follow-up:   Future Appointments   Date Time Provider Pancho Clark   8/10/2020  1:00 PM MEGHANN Hilliard - CNP SRPX Pain [facility] - [address] [address]   9/29/2020  1:00 PM MEGHANN Prescott - [facility]   11/19/2020  1:00 PM Brittanie Lugo Urology [facility] - [address] [address]

## 2020-08-10 ENCOUNTER — OFFICE VISIT (OUTPATIENT)
Dept: PHYSICAL MEDICINE AND REHAB | Age: 85
End: 2020-08-10
Payer: MEDICARE

## 2020-08-10 VITALS
HEIGHT: 67 IN | DIASTOLIC BLOOD PRESSURE: 68 MMHG | SYSTOLIC BLOOD PRESSURE: 116 MMHG | BODY MASS INDEX: 27.31 KG/M2 | WEIGHT: 174 LBS | TEMPERATURE: 97.8 F

## 2020-08-10 PROCEDURE — G8427 DOCREV CUR MEDS BY ELIG CLIN: HCPCS | Performed by: NURSE PRACTITIONER

## 2020-08-10 PROCEDURE — G8417 CALC BMI ABV UP PARAM F/U: HCPCS | Performed by: NURSE PRACTITIONER

## 2020-08-10 PROCEDURE — 1123F ACP DISCUSS/DSCN MKR DOCD: CPT | Performed by: NURSE PRACTITIONER

## 2020-08-10 PROCEDURE — 1036F TOBACCO NON-USER: CPT | Performed by: NURSE PRACTITIONER

## 2020-08-10 PROCEDURE — 4040F PNEUMOC VAC/ADMIN/RCVD: CPT | Performed by: NURSE PRACTITIONER

## 2020-08-10 PROCEDURE — 99213 OFFICE O/P EST LOW 20 MIN: CPT | Performed by: NURSE PRACTITIONER

## 2020-08-10 ASSESSMENT — ENCOUNTER SYMPTOMS
WHEEZING: 0
EYE PAIN: 0
ABDOMINAL PAIN: 0
CHEST TIGHTNESS: 0
COLOR CHANGE: 0
NAUSEA: 0
DIARRHEA: 0
BACK PAIN: 1
RHINORRHEA: 0
SHORTNESS OF BREATH: 0
VOMITING: 0
PHOTOPHOBIA: 0
SORE THROAT: 0
COUGH: 0
CONSTIPATION: 0
SINUS PRESSURE: 0

## 2020-08-10 NOTE — PROGRESS NOTES
[facility]  [address]  Dept: [phone]  Dept Fax: [phone]: [phone]    Visit Date: 8/10/2020    Functionality Assessment/Goals Worksheet     On a scale of 0 (Does not Interfere) to 10 (Completely Interferes)     1. Which number describes how during the past week pain has interfered with       the following:  A. General Activity:  5  B. Mood: 9  C. Walking Ability:  5  D. Normal Work (Includes both work outside the home and housework):  5  E. Relations with Other People:   8  F. Sleep:   9  G. Enjoyment of Life:   7    2. Patient Prefers to Take their Pain Medications:     []  On a regular basis   [x]  Only when necessary    []  Does not take pain medications    3. What are the Patient's Goals/Expectations for Visiting Pain Management? []  Learn about my pain    []  Receive Medication   [x]  Physical Therapy     []  Treat Depression   []  Receive Injections    []  Treat Sleep   []  Deal with Anxiety and Stress   []  Treat Opoid Dependence/Addiction   []  Other:    HPI:   Radha Pereyra is a 80 y.o. male is here today for    Chief Complaint: Low back pain, Left leg pain and Hip pain Si pain     HPI   Here with cane, no new health issues since last visit per pt. F/U continues to have low back left SI left leg pain. He has had LESI L5 left in 2018 with 50% pain relief for 12-18 months for back pain but no relief for leg pain. He refuses  injections at this time. We have discussed TFLESI Lumbar facet MBB hip steroid or SI MBB. He continues to use Butrans patch and Tramadol prn. Medications reviewed. Patient denies side effects with medications. Patient states he is taking medications as prescribed. Hedenies receiving pain medications from other sources. He denies any ER visits since last visit.     Pain scale with out pain medications or at its worst is 8/10.  Pain scale with pain medications or at its best is 1/10. Last dose of Butrans patch LACHO Tramadol  was today  Drug screen reviewed from 2/2020and was appropriate  Pill count was not completed today and patient was educated on bringing in medications  Patient does not have naloxone available at home. Patient has not required use of naloxone at home since last office visit. The patienthas No Known Allergies. Past Medical History  Gris Eid  has a past medical history of Atrial fibrillation ([facility] [address] Bacterial pneumonia, CAD (coronary artery disease), Chronic kidney disease, Collar bone fracture, Diabetes mellitus ([facility] [address] Enlarged prostate, Hypertension, Mild acid reflux, Pancreatitis, gallstone, Sleep apnea, and UTI (urinary tract infection). Past Surgical History  The patient  has a past surgical history that includes hernia repair; Tonsillectomy and adenoidectomy; Cholecystectomy; Endoscopy, colon, diagnostic; Colonoscopy (01/12/2011,10/09/2015); Cardiac surgery; skin biopsy; Cataract removal; TURP (01/04/2010); hernia repair (Right, 03/12/2010); pacemaker placement (3/23/15); and pr njx dx/ther sbst intrlmnr lmbr/sac w/img gdn (Right, 10/29/2018). Family History  This patient's family history includes Cancer in his father; Diabetes in his mother; Heart Disease in his father; High Blood Pressure in his mother. Social History  Gris Eid  reports that he has never smoked. He has never used smokeless tobacco. He reports current alcohol use. He reports that he does not use drugs. Medications    Current Outpatient Medications:     buprenorphine (BUTRANS) 7.5 MCG/HR PTWK, Place 1 patch onto the skin once a week for 28 days.  7 days a week, Disp: 4 patch, Rfl: 0    aspirin 81 MG EC tablet, Take 81 mg by mouth daily, Disp: , Rfl:     traMADol (ULTRAM) 50 MG tablet, Take 50 mg by mouth every 6 hours as needed for Pain., Disp: , Rfl:     albuterol sulfate  (90 Base) MCG/ACT inhaler, INHALE 2 PUFFS INTO THE LUNGS EVERY 6 HOURS AS NEEDED FOR WHEEZING, Disp: 25.5 g, Rfl: 3    montelukast (SINGULAIR) 10 MG tablet, TAKE 1 TABLET BY MOUTH EVERY NIGHT, Disp: 90 tablet, Rfl: 3    lidocaine (XYLOCAINE) 5 % ointment, Apply topically as needed for Pain Apply topically as needed. , Disp: 3 Tube, Rfl: 2    ARNUITY ELLIPTA 100 MCG/ACT AEPB, INHALE 1 PUFF INTO THE LUNGS EVERY DAY, Disp: 30 each, Rfl: 11    finasteride (PROSCAR) 5 MG tablet, TAKE 1 TABLET BY MOUTH DAILY, Disp: 90 tablet, Rfl: 3    tamsulosin (FLOMAX) 0.4 MG capsule, Take 1 capsule by mouth 2 times daily, Disp: 180 capsule, Rfl: 3    amiodarone (PACERONE) 100 MG tablet, Take 100 mg by mouth daily, Disp: , Rfl:     Calcium Carb-Cholecalciferol (CALCIUM/VITAMIN D PO), Take by mouth daily 630-500, Disp: , Rfl:     carvedilol (COREG) 6.25 MG tablet, Take 6.25 mg by mouth daily , Disp: , Rfl:     metFORMIN (GLUCOPHAGE) 500 MG tablet, Take 500 mg by mouth daily (with breakfast) , Disp: , Rfl:     sacubitril-valsartan (ENTRESTO) 24-26 MG per tablet, Take 1 tablet by mouth 2 times daily, Disp: , Rfl:     triamcinolone (KENALOG) 0.025 % LOTN lotion, Apply topically as needed , Disp: , Rfl:     furosemide (LASIX) 20 MG tablet, Take 40 mg by mouth daily , Disp: , Rfl:     potassium chloride (KLOR-CON) 20 MEQ packet, Take 20 mEq by mouth daily , Disp: , Rfl:     Omega-3 Fatty Acids (FISH OIL CONCENTRATE PO), Take by mouth daily , Disp: , Rfl:     hydrocortisone (WESTCORT) 0.2 % cream, Apply topically as needed Apply topically 2 times daily. , Disp: , Rfl:     Cholecalciferol (VITAMIN D3) 2000 UNITS CAPS, Take  by mouth daily. , Disp: , Rfl:     Warfarin Sodium (COUMADIN PO), Take by mouth Followed by Dr. Nevaeh Red. 2.5 mg daily. , Disp: , Rfl:     omeprazole (PRILOSEC) 20 MG capsule, Take 20 mg by mouth daily. , Disp: , Rfl:     multivitamin (THERAGRAN) per tablet, Take 1 tablet by mouth daily.   , Disp: , Rfl:     vitamin B-12 (CYANOCOBALAMIN) 100 MCG tablet, Take 500 mcg by mouth daily. , Disp: , Rfl:     gabapentin (NEURONTIN) 300 MG capsule, Take 1 capsule by mouth nightly for 30 days. , Disp: 30 capsule, Rfl: 1    Subjective:      Review of Systems   Constitutional: Positive for activity change. Negative for appetite change, chills, diaphoresis, fatigue, fever and unexpected weight change. HENT: Negative for congestion, ear pain, hearing loss, mouth sores, nosebleeds, rhinorrhea, sinus pressure and sore throat. Eyes: Negative for photophobia, pain and visual disturbance. Respiratory: Negative for cough, chest tightness, shortness of breath and wheezing. PATRICIA has ASTHMA   Cardiovascular: Negative for chest pain and palpitations. 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 GERD   Endocrine: Negative for cold intolerance, heat intolerance, polydipsia, polyphagia and polyuria. DM   Genitourinary: Negative for decreased urine volume, difficulty urinating, frequency and hematuria. Prostate issues   Musculoskeletal: Positive for arthralgias, back pain, gait problem, joint swelling, myalgias, neck pain and neck stiffness. Skin: Positive for wound. Negative for color change and rash. Right elbow carpet burn skin tear, dry dressing intact   Allergic/Immunologic: Negative for food allergies and immunocompromised state. Neurological: Positive for weakness and numbness. Negative for dizziness, tremors, seizures, syncope, facial asymmetry, speech difficulty, light-headedness and headaches. Hematological: Does not bruise/bleed easily. Psychiatric/Behavioral: Negative for agitation, behavioral problems, confusion, decreased concentration, dysphoric mood, hallucinations, self-injury, sleep disturbance and suicidal ideas. The patient is not nervous/anxious and is not hyperactive.         Objective:     Vitals:    08/10/20 1220   BP: 116/68   Temp: 97.8 °F (36.6 °C) Weight: 174 lb (78.9 kg)   Height: 5' 7\" (1.702 m)       Physical Exam  Vitals signs and nursing note reviewed. Constitutional:       General: He is not in acute distress. Appearance: He is well-developed. He is not diaphoretic. HENT:      Head: Normocephalic and atraumatic. Right Ear: External ear normal.      Left Ear: External ear normal.      Nose: Nose normal.      Mouth/Throat:      Pharynx: No oropharyngeal exudate. Eyes:      General: No scleral icterus. Right eye: No discharge. Left eye: No discharge. Conjunctiva/sclera: Conjunctivae normal.      Pupils: Pupils are equal, round, and reactive to light. Neck:      Musculoskeletal: Full passive range of motion without pain, normal range of motion and neck supple. Normal range of motion. No edema, erythema, neck rigidity or muscular tenderness. Thyroid: No thyromegaly. Cardiovascular:      Rate and Rhythm: Normal rate and regular rhythm. Heart sounds: Normal heart sounds. No murmur. No friction rub. No gallop. Pulmonary:      Effort: Pulmonary effort is normal. No respiratory distress. Breath sounds: Normal breath sounds. No wheezing or rales. Chest:      Chest wall: No tenderness. Abdominal:      General: Bowel sounds are normal. There is no distension. Palpations: Abdomen is soft. Tenderness: There is no abdominal tenderness. There is no guarding or rebound. Musculoskeletal:         General: Tenderness present. Right shoulder: Normal.      Left shoulder: Normal.      Right wrist: Normal.      Right hip: He exhibits decreased range of motion, decreased strength, tenderness and bony tenderness. Left hip: He exhibits decreased range of motion, decreased strength, tenderness and bony tenderness. Right knee: He exhibits decreased range of motion and swelling. Tenderness found. Left knee: Tenderness found. Right ankle: Tenderness.       Left ankle: Normal. Cervical back: Normal.      Thoracic back: He exhibits tenderness. Lumbar back: He exhibits decreased range of motion, tenderness, bony tenderness, pain and spasm. Back:       Right hand: Normal.      Left hand: Normal.      Right upper leg: He exhibits tenderness. Left upper leg: Normal.      Right lower leg: He exhibits tenderness. Left lower leg: Normal.      Right foot: Tenderness present. Left foot: Normal.      Comments: Right elbow skin tear form carpet burn dressing dry intact   Skin:     General: Skin is warm. Coloration: Skin is not pale. Findings: No erythema or rash. Neurological:      Mental Status: He is alert and oriented to person, place, and time. He is not disoriented. Cranial Nerves: Cranial nerves are intact. No cranial nerve deficit. Sensory: Sensation is intact. No sensory deficit. Motor: Motor function is intact. No atrophy or abnormal muscle tone. Coordination: Coordination is intact. Coordination normal.      Gait: Gait abnormal.      Deep Tendon Reflexes: Reflexes are normal and symmetric. Babinski sign absent on the right side. Reflex Scores:       Tricep reflexes are 2+ on the right side and 2+ on the left side. Bicep reflexes are 2+ on the right side and 2+ on the left side. Brachioradialis reflexes are 2+ on the right side and 2+ on the left side. Patellar reflexes are 2+ on the right side and 2+ on the left side. Achilles reflexes are 2+ on the right side and 2+ on the left side. Comments: Motor 4/5 RLE   SLR + RLE    Psychiatric:         Attention and Perception: Attention normal. He is attentive. Mood and Affect: Mood normal. Mood is not anxious or depressed. Affect is not labile, blunt, angry or inappropriate. Speech: Speech normal.         Behavior: Behavior normal. Behavior is not agitated, slowed, aggressive, withdrawn, hyperactive or combative.          Thought Counseling/coordinated the patient'scare.       Electronically signed by MEGHANN Ibarra CNP on8/10/2020 at 12:53 PM

## 2020-08-26 RX ORDER — BUPRENORPHINE 7.5 UG/H
1 PATCH TRANSDERMAL WEEKLY
Qty: 4 PATCH | Refills: 0 | Status: SHIPPED | OUTPATIENT
Start: 2020-08-30 | End: 2020-09-22 | Stop reason: SDUPTHER

## 2020-09-21 NOTE — TELEPHONE ENCOUNTER
Laina Briones called requesting a refill on the following medications:  Requested Prescriptions     Pending Prescriptions Disp Refills    buprenorphine (BUTRANS) 7.5 MCG/HR PTWK 4 patch 0     Sig: Place 1 patch onto the skin once a week for 28 days.  7 days a week     Pharmacy verified:  [email or web address]    Date of last visit: 08/10/2020  Date of next visit (if applicable): 57/38/6439

## 2020-09-22 RX ORDER — BUPRENORPHINE 7.5 UG/H
1 PATCH TRANSDERMAL WEEKLY
Qty: 4 PATCH | Refills: 0 | Status: SHIPPED | OUTPATIENT
Start: 2020-09-25 | End: 2020-10-06

## 2020-10-06 ENCOUNTER — OFFICE VISIT (OUTPATIENT)
Dept: PULMONOLOGY | Age: 85
End: 2020-10-06
Payer: MEDICARE

## 2020-10-06 VITALS
TEMPERATURE: 98.1 F | HEIGHT: 68 IN | OXYGEN SATURATION: 98 % | HEART RATE: 60 BPM | WEIGHT: 173 LBS | SYSTOLIC BLOOD PRESSURE: 132 MMHG | DIASTOLIC BLOOD PRESSURE: 68 MMHG | BODY MASS INDEX: 26.22 KG/M2

## 2020-10-06 PROCEDURE — 99214 OFFICE O/P EST MOD 30 MIN: CPT | Performed by: NURSE PRACTITIONER

## 2020-10-06 PROCEDURE — 1036F TOBACCO NON-USER: CPT | Performed by: NURSE PRACTITIONER

## 2020-10-06 PROCEDURE — 4040F PNEUMOC VAC/ADMIN/RCVD: CPT | Performed by: NURSE PRACTITIONER

## 2020-10-06 PROCEDURE — G8417 CALC BMI ABV UP PARAM F/U: HCPCS | Performed by: NURSE PRACTITIONER

## 2020-10-06 PROCEDURE — 1123F ACP DISCUSS/DSCN MKR DOCD: CPT | Performed by: NURSE PRACTITIONER

## 2020-10-06 PROCEDURE — G8484 FLU IMMUNIZE NO ADMIN: HCPCS | Performed by: NURSE PRACTITIONER

## 2020-10-06 PROCEDURE — G8427 DOCREV CUR MEDS BY ELIG CLIN: HCPCS | Performed by: NURSE PRACTITIONER

## 2020-10-06 RX ORDER — FLUTICASONE FUROATE AND VILANTEROL TRIFENATATE 100; 25 UG/1; UG/1
1 POWDER RESPIRATORY (INHALATION) DAILY
Qty: 1 EACH | Refills: 11 | Status: SHIPPED | OUTPATIENT
Start: 2020-10-06 | End: 2020-11-30 | Stop reason: SDUPTHER

## 2020-10-06 ASSESSMENT — ENCOUNTER SYMPTOMS
ABDOMINAL PAIN: 0
EYES NEGATIVE: 1
SHORTNESS OF BREATH: 1
NAUSEA: 0
COUGH: 1
DIARRHEA: 0
VOMITING: 0
WHEEZING: 0

## 2020-10-06 NOTE — PROGRESS NOTES
[facility]     Patient: Puneet Richards, 80 y.o.   : 1934  10/6/2020    Pt of Fercho Sood CNP      Subjective     Chief Complaint   Patient presents with    Follow-up     3mo Asthma f/u         HPI  Ligia Kay is here for 3 month  follow up for asthma  History of elevated NO levels, has been well controlled on Arnuity 100 mcg  Last NO value was 22 on 2020  Even with normal NO levels he presented to office 3 months ago with c/o subacute cough,productive just in mornings , can be yellow to brown colored sputum. Montelukast ordered, he never started , he is fearful of side effects/ interacations with his other medications. Has been noticing SOB on exertion which is new to him. Not bad enough that he has to stop walking  No associated wheezing and actual his cough has been less in past few weeks.    Wife notices he coughs when eating something salty foods    Past Medical hx   PMH:  Past Medical History:   Diagnosis Date    Atrial fibrillation ([address]     Bacterial pneumonia 2014    CAD (coronary artery disease)     mitral valve repair only    Chronic kidney disease     Collar bone fracture 1949    right side    Diabetes mellitus ([address]     Enlarged prostate Early     Hypertension 2013    Mild acid reflux 2013    Pancreatitis, gallstone     Sleep apnea     doesn't wear CPAP    UTI (urinary tract infection)      SURGICAL HISTORY:  Past Surgical History:   Procedure Laterality Date    CARDIAC SURGERY      mitral valve repair    CATARACT REMOVAL      CHOLECYSTECTOMY      COLONOSCOPY  2011,10/09/2015    ENDOSCOPY, COLON, DIAGNOSTIC      HERNIA REPAIR      HERNIA REPAIR Right 2010   [facility] PACEMAKER PLACEMENT  3/23/15    [facility] [place] [id number]    [facility] DX/THER SBST INTRLMNR LMBR/SAC W/IMG GDN Right 10/29/2018    LESI  L5 RIGHT SIDE performed by Marck Pelayo MD at [facility] negative    TONSILLECTOMY AND ADENOIDECTOMY      TURP  01/04/2010     SOCIAL HISTORY:  Social History     Tobacco Use    Smoking status: Never Smoker    Smokeless tobacco: Never Used    Tobacco comment: never   Substance Use Topics    Alcohol use: Yes     Alcohol/week: 0.0 standard drinks     Types: 1 Glasses of wine per week     Comment: very little. right now none.  Drug use: No     ALLERGIES:No Known Allergies  FAMILY HISTORY:  Family History   Problem Relation Age of Onset    Diabetes Mother     High Blood Pressure Mother     Cancer Father     Heart Disease Father     Stroke Neg Hx      CURRENT MEDICATIONS:  Current Outpatient Medications   Medication Sig Dispense Refill    fluticasone-vilanterol (BREO ELLIPTA) 100-25 MCG/INH AEPB inhaler Inhale 1 puff into the lungs daily Rinse mouth after its use. 1 each 11    traMADol (ULTRAM) 50 MG tablet Take 50 mg by mouth every 6 hours as needed for Pain.  albuterol sulfate  (90 Base) MCG/ACT inhaler INHALE 2 PUFFS INTO THE LUNGS EVERY 6 HOURS AS NEEDED FOR WHEEZING 25.5 g 3    lidocaine (XYLOCAINE) 5 % ointment Apply topically as needed for Pain Apply topically as needed.  3 Tube 2    finasteride (PROSCAR) 5 MG tablet TAKE 1 TABLET BY MOUTH DAILY 90 tablet 3    tamsulosin (FLOMAX) 0.4 MG capsule Take 1 capsule by mouth 2 times daily 180 capsule 3    amiodarone (PACERONE) 100 MG tablet Take 100 mg by mouth daily      Calcium Carb-Cholecalciferol (CALCIUM/VITAMIN D PO) Take by mouth daily 630-500      carvedilol (COREG) 6.25 MG tablet Take 6.25 mg by mouth daily       metFORMIN (GLUCOPHAGE) 500 MG tablet Take 500 mg by mouth daily (with breakfast)       sacubitril-valsartan (ENTRESTO) 24-26 MG per tablet Take 1 tablet by mouth 2 times daily      triamcinolone (KENALOG) 0.025 % LOTN lotion Apply topically as needed       furosemide (LASIX) 20 MG tablet Take 40 mg by mouth daily       potassium chloride (KLOR-CON) 20 MEQ packet Take 20 mEq by mouth daily       Omega-3 Fatty Acids (FISH OIL CONCENTRATE PO) Take by mouth daily       hydrocortisone (WESTCORT) 0.2 % cream Apply topically as needed Apply topically 2 times daily.  Cholecalciferol (VITAMIN D3) 2000 UNITS CAPS Take  by mouth daily.  Warfarin Sodium (COUMADIN PO) Take by mouth Followed by Dr. Tawny Barrera. 2.5 mg daily.  omeprazole (PRILOSEC) 20 MG capsule Take 20 mg by mouth daily.  multivitamin (THERAGRAN) per tablet Take 1 tablet by mouth daily.  vitamin B-12 (CYANOCOBALAMIN) 100 MCG tablet Take 500 mcg by mouth daily.  aspirin 81 MG EC tablet Take 81 mg by mouth daily      gabapentin (NEURONTIN) 300 MG capsule Take 1 capsule by mouth nightly for 30 days. 30 capsule 1     No current facility-administered medications for this visit. Iona Lovelock ROS   Review of Systems   Constitutional: Negative for activity change, appetite change, chills, fatigue, fever and unexpected weight change. HENT: Negative. Eyes: Negative. Respiratory: Positive for cough (productive in mornings) and shortness of breath. Negative for wheezing. Cardiovascular: Negative for chest pain, palpitations and leg swelling. Gastrointestinal: Negative for abdominal pain, diarrhea, nausea and vomiting. Genitourinary: Negative. Musculoskeletal: Negative. Skin: Negative. Allergic/Immunologic: Negative for environmental allergies. Neurological: Negative. Hematological: Does not bruise/bleed easily. Psychiatric/Behavioral: Negative for sleep disturbance and suicidal ideas.      Physical exam   /68 (Site: Left Upper Arm, Position: Sitting, Cuff Size: Medium Adult)   Pulse 60   Temp 98.1 °F (36.7 °C) (Temporal)   Ht 5' 8\" (1.727 m)   Wt 173 lb (78.5 kg)   SpO2 98% Comment: r/a  BMI 26.30 kg/m²      Wt Readings from Last 3 Encounters:   10/06/20 173 lb (78.5 kg)   08/10/20 174 lb (78.9 kg)   07/14/20 174 lb (78.9 kg)     Physical Exam  Vitals signs and nursing note reviewed. Constitutional:       General: He is not in acute distress. Appearance: Normal appearance. He is well-developed. HENT:      Head: Normocephalic and atraumatic. Mouth/Throat:      Lips: Pink. Mouth: Mucous membranes are moist.      Pharynx: Oropharynx is clear. No oropharyngeal exudate or posterior oropharyngeal erythema. Eyes:      Conjunctiva/sclera: Conjunctivae normal.   Neck:      Vascular: No JVD. Cardiovascular:      Rate and Rhythm: Normal rate and regular rhythm. Heart sounds: No murmur. No friction rub. Pulmonary:      Effort: Pulmonary effort is normal. No tachypnea, bradypnea, accessory muscle usage or respiratory distress. Breath sounds: Normal breath sounds. No wheezing, rhonchi or rales. Chest:      Chest wall: No tenderness. Musculoskeletal:         General: Deformity (kyphosis ) present. Right lower leg: No edema. Left lower leg: No edema. Skin:     General: Skin is warm and dry. Capillary Refill: Capillary refill takes less than 2 seconds. Nails: There is no clubbing. Neurological:      Mental Status: He is alert and oriented to person, place, and time. Psychiatric:         Attention and Perception: Attention normal.         Mood and Affect: Mood normal.         Speech: Speech normal.         Behavior: Behavior normal.         Thought Content: Thought content normal.         Cognition and Memory: Cognition normal.         Judgment: Judgment normal.       Test results   Lung Nodule Screening     [] Qualifies    [x]Does not qualify   [] Declined    [] Completed               HRCT 7/2019     Impression    1. Hyperexpanded lung changes compatible with obstructive lung disease. 2. Bronchiectasis changes the left lateral lung base without acute inflammatory wall changes.     3. Degenerative changes of the skeleton with levo scoliosis of the thoracic spine.         Final report electronically signed by Dr. Hali Holter Natali on 7/4/2019 6:14 AM        Assessment      Diagnosis Orders   1. Moderate persistent asthma without complication  CT CHEST HIGH RESOLUTION    Full PFT Study With Bronchodilator   2. Bronchiectasis without complication ([address]  CT CHEST HIGH RESOLUTION    Full PFT Study With Bronchodilator   3. SOB (shortness of breath) on exertion           Plan   -full PFT  -HRCT to follow bronchiectasis  -stop Arnuity  -start Breo 100/5 , provided with 2 samples in office today , instructed on proper use and educated on possible side effects  -DC Singulair- never started  -continue PRN ProAir  -education sheet provided to patient about bronchiectasis.  Currently has good cough, will hold in oscillating flutter device      Will see Vlad Geiger in: 2 months, pt wants testing at [facility]     Electronically signed by MEGHANN Pedersen CNP on 10/6/2020 at 2:09 PM

## 2020-10-06 NOTE — PATIENT INSTRUCTIONS
an airway clearance device, such as a flutter valve, as directed to help remove mucus from the lungs. · Avoid lung infections. ? Get shots to protect against the flu and pneumococcal disease. ? Wash your hands frequently. ? Avoid close contact with people who have colds or the flu. ? Do not smoke or allow others to smoke around you. If you need help quitting, talk to your doctor about stop-smoking programs and medicines. These can increase your chances of quitting for good. ? Stay inside, if you can, on days when the pollution level is high. When should you call for help? [id number] anytime you think you may need emergency care. For example, call if:  · You have severe trouble breathing. · You cough up more than 1 cup of blood. Call your doctor now or seek immediate medical care if:  · You have chest pain. · You have shortness of breath. · You have a fever. · Your mucus (sputum) is bloody or changes color. Watch closely for changes in your health, and be sure to contact your doctor if:  · You are coughing up more sputum than before. · Your symptoms get worse or do not get better with treatment. Where can you learn more? Go to [email or web address] org and sign in to your [employer] account. Enter [phone] in the [facility] box to learn more about \"Bronchiectasis: Care Instructions. \"     If you do not have an account, please click on the \"Sign Up Now\" link. Current as of: February 24, 2020               Content Version: 12.5  © 2006-2020 [employer]. Care instructions adapted under license by [facility] ([facility]). If you have questions about a medical condition or this instruction, always ask your healthcare professional. [place] [address] any warranty or liability for your use of this information.

## 2020-10-14 ENCOUNTER — HOSPITAL ENCOUNTER (OUTPATIENT)
Dept: CT IMAGING | Age: 85
Discharge: HOME | End: 2020-10-14

## 2020-10-15 ENCOUNTER — OFFICE VISIT (OUTPATIENT)
Dept: PULMONOLOGY | Age: 85
End: 2020-10-15
Payer: MEDICARE

## 2020-10-15 VITALS
SYSTOLIC BLOOD PRESSURE: 118 MMHG | DIASTOLIC BLOOD PRESSURE: 72 MMHG | WEIGHT: 173.6 LBS | OXYGEN SATURATION: 96 % | HEART RATE: 65 BPM | BODY MASS INDEX: 27.25 KG/M2 | TEMPERATURE: 98.3 F | HEIGHT: 67 IN

## 2020-10-15 PROCEDURE — G8427 DOCREV CUR MEDS BY ELIG CLIN: HCPCS | Performed by: NURSE PRACTITIONER

## 2020-10-15 PROCEDURE — 99213 OFFICE O/P EST LOW 20 MIN: CPT | Performed by: NURSE PRACTITIONER

## 2020-10-15 PROCEDURE — 1123F ACP DISCUSS/DSCN MKR DOCD: CPT | Performed by: NURSE PRACTITIONER

## 2020-10-15 PROCEDURE — G8417 CALC BMI ABV UP PARAM F/U: HCPCS | Performed by: NURSE PRACTITIONER

## 2020-10-15 PROCEDURE — G8484 FLU IMMUNIZE NO ADMIN: HCPCS | Performed by: NURSE PRACTITIONER

## 2020-10-15 PROCEDURE — 1036F TOBACCO NON-USER: CPT | Performed by: NURSE PRACTITIONER

## 2020-10-15 PROCEDURE — 4040F PNEUMOC VAC/ADMIN/RCVD: CPT | Performed by: NURSE PRACTITIONER

## 2020-10-15 ASSESSMENT — ENCOUNTER SYMPTOMS
DIARRHEA: 0
VOMITING: 0
ABDOMINAL PAIN: 0
WHEEZING: 0
COUGH: 0
CHEST TIGHTNESS: 0
SHORTNESS OF BREATH: 1
EYES NEGATIVE: 1
NAUSEA: 0

## 2020-10-15 NOTE — PROGRESS NOTES
[facility]     Patient: Elder Madden, 80 y.o.   : 1934  10/15/2020    Pt of Milagros Salgado CNP      Subjective     Chief Complaint   Patient presents with    Follow-up     ct chest 10/9/2020 images in [facility] pft 10/9/2020 [facility]        HPI  Vy Rizzo is here for 2 month follow up for his uncontrolled persistent asthma  Completed Ct and PFt for review. Stopped Arnuity last visit, increased to ICS/LABA with Breo 100/5  Does feel that his cough is gone with use of Breo    Uses his albuterol once about every 2-3 days, with benefit. Previous CT showed bronchiectasis in left lung base as well as levo scoliosis thoracic spine   prvious PFT 2018-moderate restriction     No exacerbations over past 2 months  Getting flu shot next week.  Has pneumovax 23 in 2016    Past Medical hx   PMH:  Past Medical History:   Diagnosis Date    Atrial fibrillation ([address]     Bacterial pneumonia 2014    CAD (coronary artery disease)     mitral valve repair only    Chronic kidney disease     Collar bone fracture 1949    right side    Diabetes mellitus ([address]     Enlarged prostate Early     Hypertension 2013    Mild acid reflux 2013    Pancreatitis, gallstone     Sleep apnea     doesn't wear CPAP    UTI (urinary tract infection)      SURGICAL HISTORY:  Past Surgical History:   Procedure Laterality Date    CARDIAC SURGERY      mitral valve repair    CATARACT REMOVAL      CHOLECYSTECTOMY      COLONOSCOPY  2011,10/09/2015    ENDOSCOPY, COLON, DIAGNOSTIC      HERNIA REPAIR      HERNIA REPAIR Right 2010   Sasha [employer] PACEMAKER PLACEMENT  3/23/15    [facility] [address]    IL Yasmany Angelo [address] 84 DX/THER SBST INTRLMNR LMBR/SAC W/IMG GDN Right 10/29/2018    LESI  L5 RIGHT SIDE performed by Felice Vazquez MD at [facility]      negative    TONSILLECTOMY AND ADENOIDECTOMY      TURP  2010     SOCIAL HISTORY:  Social History Tobacco Use    Smoking status: Never Smoker    Smokeless tobacco: Never Used    Tobacco comment: never   Substance Use Topics    Alcohol use: Yes     Alcohol/week: 0.0 standard drinks     Types: 1 Glasses of wine per week     Comment: very little. right now none.  Drug use: No     ALLERGIES:No Known Allergies  FAMILY HISTORY:  Family History   Problem Relation Age of Onset    Diabetes Mother     High Blood Pressure Mother     Cancer Father     Heart Disease Father     Stroke Neg Hx      CURRENT MEDICATIONS:  Current Outpatient Medications   Medication Sig Dispense Refill    fluticasone-vilanterol (BREO ELLIPTA) 100-25 MCG/INH AEPB inhaler Inhale 1 puff into the lungs daily Rinse mouth after its use. 1 each 11    aspirin 81 MG EC tablet Take 81 mg by mouth daily      traMADol (ULTRAM) 50 MG tablet Take 50 mg by mouth every 6 hours as needed for Pain.  albuterol sulfate  (90 Base) MCG/ACT inhaler INHALE 2 PUFFS INTO THE LUNGS EVERY 6 HOURS AS NEEDED FOR WHEEZING 25.5 g 3    lidocaine (XYLOCAINE) 5 % ointment Apply topically as needed for Pain Apply topically as needed.  3 Tube 2    finasteride (PROSCAR) 5 MG tablet TAKE 1 TABLET BY MOUTH DAILY 90 tablet 3    tamsulosin (FLOMAX) 0.4 MG capsule Take 1 capsule by mouth 2 times daily 180 capsule 3    amiodarone (PACERONE) 100 MG tablet Take 100 mg by mouth daily      Calcium Carb-Cholecalciferol (CALCIUM/VITAMIN D PO) Take by mouth daily 630-500      carvedilol (COREG) 6.25 MG tablet Take 6.25 mg by mouth daily       metFORMIN (GLUCOPHAGE) 500 MG tablet Take 500 mg by mouth daily (with breakfast)       sacubitril-valsartan (ENTRESTO) 24-26 MG per tablet Take 1 tablet by mouth 2 times daily      triamcinolone (KENALOG) 0.025 % LOTN lotion Apply topically as needed       furosemide (LASIX) 20 MG tablet Take 40 mg by mouth daily       potassium chloride (KLOR-CON) 20 MEQ packet Take 20 mEq by mouth daily       Omega-3 Fatty Acids (FISH OIL CONCENTRATE PO) Take by mouth daily       hydrocortisone (WESTCORT) 0.2 % cream Apply topically as needed Apply topically 2 times daily.  Cholecalciferol (VITAMIN D3) 2000 UNITS CAPS Take  by mouth daily.  Warfarin Sodium (COUMADIN PO) Take by mouth Followed by Dr. Errol Agosto. 2.5 mg daily.  omeprazole (PRILOSEC) 20 MG capsule Take 20 mg by mouth daily.  multivitamin (THERAGRAN) per tablet Take 1 tablet by mouth daily.  vitamin B-12 (CYANOCOBALAMIN) 100 MCG tablet Take 500 mcg by mouth daily.  gabapentin (NEURONTIN) 300 MG capsule Take 1 capsule by mouth nightly for 30 days. 30 capsule 1     No current facility-administered medications for this visit. Michelle MONTGOMERY   Review of Systems   Constitutional: Positive for fatigue (fatigues with some activities ). Negative for activity change, appetite change, chills, fever and unexpected weight change. HENT: Negative. Eyes: Negative. Respiratory: Positive for shortness of breath. Negative for cough, chest tightness and wheezing. Cardiovascular: Negative for chest pain, palpitations and leg swelling. Gastrointestinal: Negative for abdominal pain, diarrhea, nausea and vomiting. Genitourinary: Negative. Musculoskeletal: Negative. Skin: Negative. Neurological: Negative. Hematological: Does not bruise/bleed easily. Psychiatric/Behavioral: Negative for sleep disturbance and suicidal ideas. Physical exam   /72 (Site: Left Upper Arm, Position: Sitting, Cuff Size: Medium Adult)   Pulse 65   Temp 98.3 °F (36.8 °C)   Ht 5' 7\" (1.702 m)   Wt 173 lb 9.6 oz (78.7 kg)   SpO2 96% Comment: room air at rest  BMI 27.19 kg/m²      Wt Readings from Last 3 Encounters:   10/15/20 173 lb 9.6 oz (78.7 kg)   10/06/20 173 lb (78.5 kg)   08/10/20 174 lb (78.9 kg)     Physical Exam  Vitals signs and nursing note reviewed. Constitutional:       General: He is not in acute distress.      Appearance: He is well-developed. HENT:      Mouth/Throat:      Lips: Pink. Mouth: Mucous membranes are moist.      Pharynx: Oropharynx is clear. No oropharyngeal exudate or posterior oropharyngeal erythema. Eyes:      Conjunctiva/sclera: Conjunctivae normal.   Neck:      Vascular: No JVD. Cardiovascular:      Rate and Rhythm: Normal rate and regular rhythm. Heart sounds: No murmur. No friction rub. Pulmonary:      Effort: Pulmonary effort is normal. No accessory muscle usage or respiratory distress. Breath sounds: Normal breath sounds. No wheezing, rhonchi or rales. Chest:      Chest wall: No tenderness. Musculoskeletal:         General: Deformity (scolisosis) present. Right lower leg: No edema. Left lower leg: No edema. Skin:     General: Skin is warm and dry. Capillary Refill: Capillary refill takes less than 2 seconds. Nails: There is no clubbing. Neurological:      Mental Status: He is alert. Psychiatric:         Mood and Affect: Mood normal.         Behavior: Behavior normal.         Thought Content: Thought content normal.         Judgment: Judgment normal.          Test results   Lung Nodule Screening     [] Qualifies    [x]Does not qualify   [] Declined    [] Completed    Ct chest wo contrast - performed at [facility] 10/8/2020  COMPARISON:   January 6 2019      ___________________________________            FINDINGS:      Lungs are clear with minor basilar atelectasis. Kirke Macdonald airways are      patent.  Pleural surfaces are intact.            There is no fibrotic, cystic, nodular or parenchymal disease.  There is no      gas trapping.            Mediastinal contents are normal.  Esophagus is normal.  There is      cardiomegaly with severe enlargement of the left atrium, mild enlargement      of the right heart.  Pericardium is normal.  Pacemaker leads terminating      in the right atrium and right ventricle.  There are sternotomy wires.      There is mild pulmonary arterial hypertension. .            Osseous structures are intact.  Gallbladder is removed.  Adrenals are      normal.      ___________________________________            IMPRESSION:      1.  Unremarkable chest.            2.  No chronic lung disease.            3.  Normal airways.  No bronchiectasis.            4.  Extensive heart disease, including diastolic dysfunction and pulmonary      arterial hypertension.            Electronically Signed: Richmond Barfield,      2020/10/09 at 20:43 EDT      Tel [phone], Service support  [phone], Fax [phone]                     Worsened restriction and further decline on DLCO when compared to 2018  Assessment      Diagnosis Orders   1. Moderate persistent asthma without complication     2. Restrictive lung disease      due to asthma vs. kychoscoliosis       Cough as resolved. Plan   -continue Breo.  OOP is going to be $71 for him, provided pt with 2 samples today and gave list of alternative inhalers to check pricing with his insurance company  -continue PRN albuterol for SOB/ wheezing  -avoidance of known asthma triggers  -recommend proceeding with flu vaccine next week with PCP     Will see Chitra Deras in: 6 months    Electronically signed by MEGHANN Kidd CNP on 10/15/2020 at 11:14 AM

## 2020-10-20 NOTE — TELEPHONE ENCOUNTER
Pérez Bowling called requesting a refill on the following medications:  Requested Prescriptions     Pending Prescriptions Disp Refills    buprenorphine (BUTRANS) 7.5 MCG/HR PTWK 4 patch 0     Sig: Place 1 patch onto the skin once a week for 28 days.  7 days a week     Pharmacy verified: [facility] [address]      Date of last visit: 6/1/20  Date of next visit (if applicable): 06/17/9340

## 2020-10-21 RX ORDER — BUPRENORPHINE 7.5 UG/H
1 PATCH TRANSDERMAL WEEKLY
Qty: 4 PATCH | Refills: 0 | Status: SHIPPED | OUTPATIENT
Start: 2020-10-24 | End: 2020-11-16 | Stop reason: SDUPTHER

## 2020-10-26 NOTE — TELEPHONE ENCOUNTER
Silvana Gallego called requesting a refill on the following medications:  Requested Prescriptions     Pending Prescriptions Disp Refills    traMADol (ULTRAM) 50 MG tablet        Sig: Take 1 tablet by mouth every 6 hours as needed for Pain.  lidocaine (XYLOCAINE) 5 % ointment 3 Tube 2     Sig: Apply topically as needed for Pain Apply topically as needed.      Pharmacy verified:  yes      Date of last visit: 8-  Date of next visit (if applicable): 04/67/0428

## 2020-10-27 RX ORDER — TRAMADOL HYDROCHLORIDE 50 MG/1
50 TABLET ORAL EVERY 6 HOURS PRN
Qty: 120 TABLET | Refills: 0 | Status: SHIPPED | OUTPATIENT
Start: 2020-10-27 | End: 2021-05-11 | Stop reason: SDUPTHER

## 2020-10-27 RX ORDER — LIDOCAINE 50 MG/G
OINTMENT TOPICAL PRN
Qty: 3 TUBE | Refills: 2 | Status: SHIPPED | OUTPATIENT
Start: 2020-10-27 | End: 2020-11-26

## 2020-10-27 NOTE — TELEPHONE ENCOUNTER
OARRS reviewed. UDS: + for  Tramadol consistent. Last seen: 8/10/2020.  Follow-up:   Future Appointments   Date Time Provider Pancho Clark   11/16/2020  1:00 PM MEGHANN Weathers - CNP SRPX Pain [facility] - [facility] [address]   11/19/2020  1:00 PM MD CAMI Bianchi AM [facility] Urology [facility]   4/15/2021  1:00 PM Cris Donohue, [address] [place],[address]

## 2020-11-05 ENCOUNTER — TELEPHONE (OUTPATIENT)
Dept: PULMONOLOGY | Age: 85
End: 2020-11-05

## 2020-11-05 NOTE — TELEPHONE ENCOUNTER
Pt called and had called on 10/23 about his samples for Breo. One of them is 100mg and the other is 200mg. I talked with Brittny and she said that it is ok for Pt to try the 200mg and if it works well for him he will call to let us know and we will change script to that.

## 2020-11-16 ENCOUNTER — OFFICE VISIT (OUTPATIENT)
Dept: PHYSICAL MEDICINE AND REHAB | Age: 85
End: 2020-11-16
Payer: MEDICARE

## 2020-11-16 VITALS — SYSTOLIC BLOOD PRESSURE: 108 MMHG | DIASTOLIC BLOOD PRESSURE: 62 MMHG

## 2020-11-16 PROCEDURE — 4040F PNEUMOC VAC/ADMIN/RCVD: CPT | Performed by: NURSE PRACTITIONER

## 2020-11-16 PROCEDURE — 99213 OFFICE O/P EST LOW 20 MIN: CPT | Performed by: NURSE PRACTITIONER

## 2020-11-16 PROCEDURE — 1036F TOBACCO NON-USER: CPT | Performed by: NURSE PRACTITIONER

## 2020-11-16 PROCEDURE — G8417 CALC BMI ABV UP PARAM F/U: HCPCS | Performed by: NURSE PRACTITIONER

## 2020-11-16 PROCEDURE — 1123F ACP DISCUSS/DSCN MKR DOCD: CPT | Performed by: NURSE PRACTITIONER

## 2020-11-16 PROCEDURE — G8428 CUR MEDS NOT DOCUMENT: HCPCS | Performed by: NURSE PRACTITIONER

## 2020-11-16 PROCEDURE — G8484 FLU IMMUNIZE NO ADMIN: HCPCS | Performed by: NURSE PRACTITIONER

## 2020-11-16 RX ORDER — BUPRENORPHINE 7.5 UG/H
1 PATCH TRANSDERMAL WEEKLY
Qty: 4 PATCH | Refills: 0 | Status: SHIPPED | OUTPATIENT
Start: 2020-11-21 | End: 2020-12-16 | Stop reason: SDUPTHER

## 2020-11-16 ASSESSMENT — ENCOUNTER SYMPTOMS
BACK PAIN: 1
NAUSEA: 0
SORE THROAT: 0
SHORTNESS OF BREATH: 0
VOMITING: 0
COLOR CHANGE: 0
WHEEZING: 0
EYE PAIN: 0
COUGH: 0
PHOTOPHOBIA: 0
CHEST TIGHTNESS: 0
RHINORRHEA: 0
ABDOMINAL PAIN: 0
CONSTIPATION: 0
SINUS PRESSURE: 0
DIARRHEA: 0

## 2020-11-16 NOTE — PROGRESS NOTES
HPI:   Silvana Gallego is a 80 y.o. male is here today for    Chief Complaint: Low back pain, Right leg pain, Left leg pain and Hip pain    HPI   F/U her with cane. Had had LESI L5 left in  2018 with  50% pain relief for 12- 18 months. He continues to have low back  Si hip BLE pain. His main pain complaint is left low back . He continues to use Butrnans patch Tramadol and Lidocaine gel. Medications reviewed. Patient denies side effects with medications. Patient states he is taking medications as prescribed. Hedenies receiving pain medications from other sources. He denies any ER visits since last visit. Pain scale with out pain medications or at its worst is 4/10. Pain scale with pain medications or at its best is 2/10. Last dose of Tramadol  was yesterday  Drug screen reviewed from 8/2020and was appropriate  Pill count was not completed today and patient was educated on bringing in medications  Patient does not have naloxone available at home. Patient has not required use of naloxone at home since last office visit. The patienthas No Known Allergies. Past Medical History  Niharika Noriega  has a past medical history of Atrial fibrillation ([address] Bacterial pneumonia, CAD (coronary artery disease), Chronic kidney disease, Collar bone fracture, Diabetes mellitus ([address] Enlarged prostate, Hypertension, Mild acid reflux, Pancreatitis, gallstone, Sleep apnea, and UTI (urinary tract infection). Past Surgical History  The patient  has a past surgical history that includes hernia repair; Tonsillectomy and adenoidectomy; Cholecystectomy; Endoscopy, colon, diagnostic; Colonoscopy (01/12/2011,10/09/2015); Cardiac surgery; skin biopsy; Cataract removal; TURP (01/04/2010); hernia repair (Right, 03/12/2010); pacemaker placement (3/23/15); and pr njx dx/ther sbst intrlmnr lmbr/sac w/img gdn (Right, 10/29/2018).     Family History  This patient's family history includes Cancer in his father; Diabetes in his Disp: , Rfl:     furosemide (LASIX) 20 MG tablet, Take 40 mg by mouth daily , Disp: , Rfl:     potassium chloride (KLOR-CON) 20 MEQ packet, Take 20 mEq by mouth daily , Disp: , Rfl:     Omega-3 Fatty Acids (FISH OIL CONCENTRATE PO), Take by mouth daily , Disp: , Rfl:     hydrocortisone (WESTCORT) 0.2 % cream, Apply topically as needed Apply topically 2 times daily. , Disp: , Rfl:     Cholecalciferol (VITAMIN D3) 2000 UNITS CAPS, Take  by mouth daily. , Disp: , Rfl:     Warfarin Sodium (COUMADIN PO), Take by mouth Followed by Dr. Viviana Woodall 2.5 mg daily. , Disp: , Rfl:     omeprazole (PRILOSEC) 20 MG capsule, Take 20 mg by mouth daily. , Disp: , Rfl:     multivitamin (THERAGRAN) per tablet, Take 1 tablet by mouth daily. , Disp: , Rfl:     vitamin B-12 (CYANOCOBALAMIN) 100 MCG tablet, Take 500 mcg by mouth daily. , Disp: , Rfl:     Subjective:      Review of Systems   Constitutional: Positive for activity change. Negative for appetite change, chills, diaphoresis, fatigue, fever and unexpected weight change. HENT: Negative for congestion, ear pain, hearing loss, mouth sores, nosebleeds, rhinorrhea, sinus pressure and sore throat. Eyes: Negative for photophobia, pain and visual disturbance. Respiratory: Negative for cough, chest tightness, shortness of breath and wheezing. PATRICIA has ASTHMA   Cardiovascular: Negative for chest pain and palpitations. 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 GERD   Endocrine: Negative for cold intolerance, heat intolerance, polydipsia, polyphagia and polyuria. DM   Genitourinary: Negative for decreased urine volume, difficulty urinating, frequency and hematuria. Prostate issues   Musculoskeletal: Positive for arthralgias, back pain, gait problem, joint swelling, myalgias, neck pain and neck stiffness. Skin: Positive for wound. Negative for color change and rash. There is no distension. Palpations: Abdomen is soft. Tenderness: There is no abdominal tenderness. There is no guarding or rebound. Musculoskeletal:         General: Tenderness present. Right shoulder: Normal.      Left shoulder: Normal.      Right wrist: Normal.      Right hip: He exhibits decreased range of motion, decreased strength, tenderness and bony tenderness. Left hip: He exhibits decreased range of motion, decreased strength, tenderness and bony tenderness. Right knee: He exhibits decreased range of motion and swelling. Tenderness found. Left knee: Tenderness found. Right ankle: Tenderness. Left ankle: Normal.      Cervical back: Normal.      Thoracic back: He exhibits tenderness. Lumbar back: He exhibits decreased range of motion, tenderness, bony tenderness, pain and spasm. Back:       Right hand: Normal.      Left hand: Normal.      Right upper leg: He exhibits tenderness. Left upper leg: Normal.      Right lower leg: He exhibits tenderness. Left lower leg: Normal.      Right foot: Tenderness present. Left foot: Normal.      Comments: Right elbow skin tear form carpet burn dressing dry intact   Skin:     General: Skin is warm. Coloration: Skin is not pale. Findings: No erythema or rash. Neurological:      Mental Status: He is alert and oriented to person, place, and time. He is not disoriented. Cranial Nerves: Cranial nerves are intact. No cranial nerve deficit. Sensory: Sensation is intact. No sensory deficit. Motor: Motor function is intact. No atrophy or abnormal muscle tone. Coordination: Coordination is intact. Coordination normal.      Gait: Gait abnormal.      Deep Tendon Reflexes: Reflexes are normal and symmetric. Babinski sign absent on the right side. Reflex Scores:       Tricep reflexes are 2+ on the right side and 2+ on the left side.        Bicep reflexes are 2+ on the right side and 2+ on the left side. Brachioradialis reflexes are 2+ on the right side and 2+ on the left side. Patellar reflexes are 2+ on the right side and 2+ on the left side. Achilles reflexes are 2+ on the right side and 2+ on the left side. Comments: Motor 4/5 RLE   SLR + RLE    Psychiatric:         Attention and Perception: Attention normal. He is attentive. Mood and Affect: Mood and affect normal. Mood is not anxious or depressed. Affect is not labile, blunt, angry or inappropriate. Speech: Speech normal.         Behavior: Behavior normal. Behavior is not agitated, slowed, aggressive, withdrawn, hyperactive or combative. Thought Content: Thought content normal. Thought content is not paranoid or delusional. Thought content does not include homicidal or suicidal ideation. Thought content does not include homicidal or suicidal plan. Cognition and Memory: Cognition normal. Memory is impaired. He does not exhibit impaired recent memory or impaired remote memory. Judgment: Judgment normal. Judgment is not impulsive or inappropriate. Comments: forgetful       IONA test:+  Yeomans test: +  Gaenslen test: +     Assessment:     1. Chronic pain syndrome    2. Lumbosacral radiculitis    3. Spondylosis of lumbar region without myelopathy or radiculopathy    4. Spinal stenosis of lumbar region without neurogenic claudication    5. Sacroiliac joint dysfunction            Plan:      · OARRS reviewed. Current MED21  · Patient was not offered naloxone for home. · Discussed long term side effects of medications, tolerance, dependency and addiction. · Previous UDS reviewed  · UDS preformed today for compliance. · Patient told can not receive any pain medications from any other source. · No evidence of abuse, diversion or aberrant behavior.    Medications and/or procedures to improve function and quality of life- patient understanding with this and that may not be pain free   Discussed with patient about safe storage of medications at home   Discussed possible weaning of medication dosing dependent on treatment/procedure results.  Testing: none   Procedures: Discussed  Repeat LESI or trying Si  Lumbar Facet MBB hip steroid or TFLESI  , refuses   Discussed with patient about risks with procedure including infection, reaction to medication, increased pain, or bleeding.  Medications:Butrans patch Tramadol PRN states pain is tolerable and manageable. Meds. Prescribed:   Orders Placed This Encounter   Medications    buprenorphine (BUTRANS) 7.5 MCG/HR PTWK     Sig: Place 1 patch onto the skin once a week for 28 days. 7 days a week     Dispense:  4 patch     Refill:  0     Reduce doses taken as pain becomes manageable       Return in about 3 months (around 2/16/2021) for Follow up pain medications. Time spent with patient was 15 minutes, more than 50% was spent Counseling/coordinated the patient'scare.       Electronically signed by MEGHANN Isaac CNP on11/16/2020 at 1:21 PM

## 2020-11-19 ENCOUNTER — OFFICE VISIT (OUTPATIENT)
Dept: UROLOGY | Age: 85
End: 2020-11-19
Payer: MEDICARE

## 2020-11-19 VITALS — WEIGHT: 173 LBS | TEMPERATURE: 96.8 F | HEIGHT: 68 IN | BODY MASS INDEX: 26.22 KG/M2

## 2020-11-19 LAB
BILIRUBIN URINE: NEGATIVE
BLOOD URINE, POC: NORMAL
CHARACTER, URINE: CLEAR
COLOR, URINE: YELLOW
GLUCOSE URINE: NEGATIVE MG/DL
KETONES, URINE: NEGATIVE
LEUKOCYTE CLUMPS, URINE: NEGATIVE
NITRITE, URINE: NEGATIVE
PH, URINE: 6 (ref 5–9)
POST VOID RESIDUAL (PVR): 85 ML
PROTEIN, URINE: NEGATIVE MG/DL
SPECIFIC GRAVITY, URINE: 1.02 (ref 1–1.03)
UROBILINOGEN, URINE: 0.2 EU/DL (ref 0–1)

## 2020-11-19 PROCEDURE — 99213 OFFICE O/P EST LOW 20 MIN: CPT | Performed by: UROLOGY

## 2020-11-19 PROCEDURE — 81003 URINALYSIS AUTO W/O SCOPE: CPT | Performed by: UROLOGY

## 2020-11-19 PROCEDURE — 1123F ACP DISCUSS/DSCN MKR DOCD: CPT | Performed by: UROLOGY

## 2020-11-19 PROCEDURE — 4040F PNEUMOC VAC/ADMIN/RCVD: CPT | Performed by: UROLOGY

## 2020-11-19 PROCEDURE — G8417 CALC BMI ABV UP PARAM F/U: HCPCS | Performed by: UROLOGY

## 2020-11-19 PROCEDURE — 51798 US URINE CAPACITY MEASURE: CPT | Performed by: UROLOGY

## 2020-11-19 PROCEDURE — G8427 DOCREV CUR MEDS BY ELIG CLIN: HCPCS | Performed by: UROLOGY

## 2020-11-19 PROCEDURE — 1036F TOBACCO NON-USER: CPT | Performed by: UROLOGY

## 2020-11-19 PROCEDURE — G8484 FLU IMMUNIZE NO ADMIN: HCPCS | Performed by: UROLOGY

## 2020-11-19 NOTE — PROGRESS NOTES
Randall Hui MD   Urology Clinic Progress Note      Patient:  Kendra Vora  YOB: 1934  Date: 11/19/2020    HISTORY OF PRESENT ILLNESS:   The patient is a 80 y.o. male who presents today for follow-up for the following problem(s):      1. Hypertrophy of prostate with urinary obstruction    2. Benign prostatic hyperplasia with lower urinary tract symptoms, symptom details unspecified    3. Microscopic hematuria           Overall the problem(s) : show no change. Associated Symptoms: No dysuria, gross hematuria. Pain Severity:      Summary of old records:    f/u for BPH and urinary retention. He has been taking Flomax since last visit in 2018However, he does admit to taking every other day instead of daily. He continues on finasteride. Additional History: Onset years  Severity is described as mild-moderate. The course of symptoms over time is chronic.   Alleviating factors: proscar and flomax  Worsening factors: none  Lower urinary tract symptoms: no bothersome urinary symptoms    Last several PSA's:  Lab Results   Component Value Date    PSA 0.66 11/07/2019       Last total testosterone:  No results found for: TESTOSTERONE    Urinalysis today:  Results for POC orders placed in visit on 11/19/20   POCT Urinalysis No Micro (Auto)   Result Value Ref Range    Glucose, Ur Negative NEGATIVE mg/dl    Bilirubin Urine Negative     Ketones, Urine Negative NEGATIVE    Specific Gravity, Urine 1.020 1.002 - 1.030    Blood, UA POC Trace-intact NEGATIVE    pH, Urine 6.00 5.0 - 9.0    Protein, Urine Negative NEGATIVE mg/dl    Urobilinogen, Urine 0.20 0.0 - 1.0 eu/dl    Nitrite, Urine Negative NEGATIVE    Leukocyte Clumps, Urine Negative NEGATIVE    Color, Urine Yellow YELLOW-STRAW    Character, Urine Clear CLR-SL.CLOUD   poct post void residual   Result Value Ref Range    post void residual 85 ml       Last BUN and creatinine:  Lab Results   Component Value Date    BUN 13 10/12/2012     Lab Results   Component Value Date    CREATININE 1.0 10/12/2012       Imaging Reviewed during this Office Visit:   (results were independently reviewed by physician and radiology report verified)    PAST MEDICAL, FAMILY AND SOCIAL HISTORY UPDATE:  Past Medical History:   Diagnosis Date    Atrial fibrillation ([facility] [address]     Bacterial pneumonia 02/07/2014    CAD (coronary artery disease)     mitral valve repair only    Chronic kidney disease     Collar bone fracture 09/1949    right side    Diabetes mellitus ([facility] [address] [facility]     Enlarged prostate Early 90's    Hypertension 8/20/2013    Mild acid reflux 8/20/2013    Pancreatitis, gallstone     Sleep apnea     doesn't wear CPAP    UTI (urinary tract infection)      Past Surgical History:   Procedure Laterality Date    CARDIAC SURGERY      mitral valve repair    CATARACT REMOVAL      CHOLECYSTECTOMY      COLONOSCOPY  01/12/2011,10/09/2015    ENDOSCOPY, COLON, DIAGNOSTIC      HERNIA REPAIR      HERNIA REPAIR Right 03/12/2010   Karon Dia PACEMAKER PLACEMENT  3/23/15    [facility] [place] [facility] [id number]    MA NJX DX/THER SBST INTRLMNR LMBR/SAC W/IMG GDN Right 10/29/2018    LESI  L5 RIGHT SIDE performed by Brett Ba MD at [facility]      negative    TONSILLECTOMY AND ADENOIDECTOMY      TURP  01/04/2010     Family History   Problem Relation Age of Onset    Diabetes Mother     High Blood Pressure Mother     Cancer Father     Heart Disease Father     Stroke Neg Hx      Outpatient Medications Marked as Taking for the 11/19/20 encounter (Office Visit) with Edita Morales MD   Medication Sig Dispense Refill    [START ON 11/21/2020] buprenorphine (BUTRANS) 7.5 MCG/HR PTWK Place 1 patch onto the skin once a week for 28 days. 7 days a week 4 patch 0    traMADol (ULTRAM) 50 MG tablet Take 1 tablet by mouth every 6 hours as needed for Pain for up to 30 days.  120 tablet 0    lidocaine (XYLOCAINE) 5 % ointment Apply topically as needed for Pain Types: 1 Glasses of wine per week    Comment: very little. right now none. REVIEW OF SYSTEMS:  Constitutional: negative  Eyes: negative  Respiratory: negative  Cardiovascular: negative  Gastrointestinal: negative  Musculoskeletal: negative  Genitourinary: negative except for what is in HPI  Skin: negative   Neurological: negative  Hematological/Lymphatic: negative  Psychological: negative    Physical Exam:      Vitals:    11/19/20 1303   Temp: 96.8 °F (36 °C)     Patient is a 80 y.o. male in no acute distress and alert and oriented to person, place and time. NAD, A/o  Non labored respiration  Normal peripheral pulses  Skin- warm and dry  Psych- normal mood and affect      Assessment and Plan      1. Hypertrophy of prostate with urinary obstruction    2. Benign prostatic hyperplasia with lower urinary tract symptoms, symptom details unspecified    3. Microscopic hematuria           Plan:      Return in about 1 year (around 11/19/2021). BPH: Continue medications. Refill. Urinalysis today shows no infection or hematuria. Follow up 1 year.            Adela Coburn M.D  [facility]

## 2020-11-30 RX ORDER — FLUTICASONE FUROATE AND VILANTEROL TRIFENATATE 100; 25 UG/1; UG/1
1 POWDER RESPIRATORY (INHALATION) DAILY
Qty: 30 EACH | Refills: 11 | Status: SHIPPED | OUTPATIENT
Start: 2020-11-30 | End: 2021-12-03

## 2020-11-30 RX ORDER — TAMSULOSIN HYDROCHLORIDE 0.4 MG/1
0.4 CAPSULE ORAL 2 TIMES DAILY
Qty: 180 CAPSULE | Refills: 3 | Status: SHIPPED | OUTPATIENT
Start: 2020-11-30 | End: 2021-12-03

## 2020-11-30 NOTE — TELEPHONE ENCOUNTER
Mirna Dobson called requesting a refill on the following medications:  Requested Prescriptions     Pending Prescriptions Disp Refills    fluticasone-vilanterol (BREO ELLIPTA) 100-25 MCG/INH AEPB inhaler 1 each 11     Sig: Inhale 1 puff into the lungs daily Rinse mouth after its use.      Pharmacy verified:  .[email or web address] Granda    Date of last visit: 10/15/20  Date of next visit (if applicable): 99/81/98

## 2020-11-30 NOTE — TELEPHONE ENCOUNTER
Received refill request from [facility] for patients flomax. Last appointment was on 11-19-20 with Dr. Marlena Arce. Please send medication to pharmacy. Thank you.

## 2020-12-15 NOTE — TELEPHONE ENCOUNTER
Pradip Camilo called requesting a refill on the following medications:  Requested Prescriptions     Pending Prescriptions Disp Refills    buprenorphine (BUTRANS) 7.5 MCG/HR PTWK 4 patch 0     Sig: Place 1 patch onto the skin once a week for 28 days. 7 days a week     Pharmacy verified:obinna wen      Date of last visit: 11/18/20  Date of next visit (if applicable): Visit date not found

## 2020-12-16 RX ORDER — BUPRENORPHINE 7.5 UG/H
1 PATCH TRANSDERMAL WEEKLY
Qty: 4 PATCH | Refills: 0 | Status: SHIPPED | OUTPATIENT
Start: 2020-12-20 | End: 2021-01-14 | Stop reason: SDUPTHER

## 2020-12-16 NOTE — TELEPHONE ENCOUNTER
OARRS reviewed. UDS: + for  Buprenorphine, Tramadol -consistent.      Last seen: 11/16/2020    Follow-up: 2/22/2021

## 2021-01-11 RX ORDER — FINASTERIDE 5 MG/1
5 TABLET, FILM COATED ORAL DAILY
Qty: 90 TABLET | Refills: 3 | Status: SHIPPED | OUTPATIENT
Start: 2021-01-11 | End: 2022-01-05

## 2021-01-11 NOTE — TELEPHONE ENCOUNTER
Ella Ann called requesting a refill on the following medications:  Requested Prescriptions     Pending Prescriptions Disp Refills    finasteride (PROSCAR) 5 MG tablet [Pharmacy Med Name: FINASTERIDE 5MG TABLETS] 90 tablet 3     Sig: TAKE 1 [id number] verified:  .[email or web address]      Date of last visit: 11/19/2020  Date of next visit (if applicable): 34/70/1805

## 2021-01-13 DIAGNOSIS — M54.17 LUMBOSACRAL RADICULITIS: ICD-10-CM

## 2021-01-13 DIAGNOSIS — G89.4 CHRONIC PAIN SYNDROME: ICD-10-CM

## 2021-01-13 NOTE — TELEPHONE ENCOUNTER
Johnson Reina called requesting a refill on the following medications:  Requested Prescriptions     Pending Prescriptions Disp Refills    buprenorphine (BUTRANS) 7.5 MCG/HR PTWK 4 patch 0     Sig: Place 1 patch onto the skin once a week for 28 days. 7 days a week     Pharmacy verified: Tristan [address], [address]  . pv      Date of last visit: 11/16/2020  Date of next visit (if applicable): 7/37/2760

## 2021-01-14 RX ORDER — BUPRENORPHINE 7.5 UG/H
1 PATCH TRANSDERMAL WEEKLY
Qty: 4 PATCH | Refills: 0 | Status: SHIPPED | OUTPATIENT
Start: 2021-01-16 | End: 2021-02-08 | Stop reason: SDUPTHER

## 2021-01-14 NOTE — TELEPHONE ENCOUNTER
OARRS reviewed. UDS: + for  Butrans, tramadol - consistent    Last seen: 11/16/2020.      Follow-up:   Future Appointments   Date Time Provider Pancho Clark   2/22/2021  1:00 PM MEGHANN Harris - CNP N [facility] Pain P - Lisa Christine   4/15/2021  1:00 PM MEGHANN Curtis [address]   11/18/2021  1:15 PM Melly Urbina, [address] [facility]

## 2021-02-08 DIAGNOSIS — M54.17 LUMBOSACRAL RADICULITIS: ICD-10-CM

## 2021-02-08 DIAGNOSIS — G89.4 CHRONIC PAIN SYNDROME: ICD-10-CM

## 2021-02-08 RX ORDER — BUPRENORPHINE 7.5 UG/H
1 PATCH TRANSDERMAL WEEKLY
Qty: 4 PATCH | Refills: 0 | Status: SHIPPED | OUTPATIENT
Start: 2021-02-12 | End: 2021-03-08 | Stop reason: SDUPTHER

## 2021-02-08 NOTE — TELEPHONE ENCOUNTER
OARRS reviewed. UDS: Inconsistent Norbuprenorphine. Positive Buprenorphine, O-Desmethyl-Tramadol, Tramadol. Last seen: 11/16/2020.  Follow-up: 2/22/21

## 2021-02-08 NOTE — TELEPHONE ENCOUNTER
Alma Chicas called requesting a refill on the following medications:  Requested Prescriptions     Pending Prescriptions Disp Refills    buprenorphine (BUTRANS) 7.5 MCG/HR PTWK 4 patch 0     Sig: Place 1 patch onto the skin once a week for 28 days. 7 days a week     Pharmacy verified: Dung Hurst on [address] [place] [address] Drive, [address]  . pv      Date of last visit: 11/16/2020  Date of next visit (if applicable): 9/11/8142

## 2021-02-22 ENCOUNTER — OFFICE VISIT (OUTPATIENT)
Dept: PHYSICAL MEDICINE AND REHAB | Age: 86
End: 2021-02-22
Payer: MEDICARE

## 2021-02-22 VITALS
HEIGHT: 68 IN | BODY MASS INDEX: 26.52 KG/M2 | WEIGHT: 175 LBS | TEMPERATURE: 97 F | SYSTOLIC BLOOD PRESSURE: 116 MMHG | DIASTOLIC BLOOD PRESSURE: 64 MMHG | HEART RATE: 80 BPM

## 2021-02-22 DIAGNOSIS — M48.061 SPINAL STENOSIS OF LUMBAR REGION WITHOUT NEUROGENIC CLAUDICATION: ICD-10-CM

## 2021-02-22 DIAGNOSIS — M54.17 LUMBOSACRAL RADICULITIS: Primary | ICD-10-CM

## 2021-02-22 DIAGNOSIS — M47.816 SPONDYLOSIS OF LUMBAR REGION WITHOUT MYELOPATHY OR RADICULOPATHY: ICD-10-CM

## 2021-02-22 DIAGNOSIS — M53.3 SACROILIAC JOINT DYSFUNCTION: ICD-10-CM

## 2021-02-22 DIAGNOSIS — G89.4 CHRONIC PAIN SYNDROME: ICD-10-CM

## 2021-02-22 PROCEDURE — 4040F PNEUMOC VAC/ADMIN/RCVD: CPT | Performed by: NURSE PRACTITIONER

## 2021-02-22 PROCEDURE — 99213 OFFICE O/P EST LOW 20 MIN: CPT | Performed by: NURSE PRACTITIONER

## 2021-02-22 PROCEDURE — 1123F ACP DISCUSS/DSCN MKR DOCD: CPT | Performed by: NURSE PRACTITIONER

## 2021-02-22 PROCEDURE — G8427 DOCREV CUR MEDS BY ELIG CLIN: HCPCS | Performed by: NURSE PRACTITIONER

## 2021-02-22 PROCEDURE — G8484 FLU IMMUNIZE NO ADMIN: HCPCS | Performed by: NURSE PRACTITIONER

## 2021-02-22 PROCEDURE — G8417 CALC BMI ABV UP PARAM F/U: HCPCS | Performed by: NURSE PRACTITIONER

## 2021-02-22 PROCEDURE — 1036F TOBACCO NON-USER: CPT | Performed by: NURSE PRACTITIONER

## 2021-02-22 ASSESSMENT — ENCOUNTER SYMPTOMS
NAUSEA: 0
SHORTNESS OF BREATH: 0
BACK PAIN: 1
ABDOMINAL PAIN: 0
COUGH: 0
RHINORRHEA: 0
EYE PAIN: 0
SORE THROAT: 0
VOMITING: 0
CONSTIPATION: 0
SINUS PRESSURE: 0
WHEEZING: 0
CHEST TIGHTNESS: 0
DIARRHEA: 0
PHOTOPHOBIA: 0
COLOR CHANGE: 0

## 2021-03-08 DIAGNOSIS — M54.17 LUMBOSACRAL RADICULITIS: ICD-10-CM

## 2021-03-08 DIAGNOSIS — G89.4 CHRONIC PAIN SYNDROME: ICD-10-CM

## 2021-03-08 RX ORDER — BUPRENORPHINE 7.5 UG/H
1 PATCH TRANSDERMAL WEEKLY
Qty: 4 PATCH | Refills: 0 | Status: SHIPPED | OUTPATIENT
Start: 2021-03-14 | End: 2021-04-12 | Stop reason: SDUPTHER

## 2021-03-08 NOTE — TELEPHONE ENCOUNTER
Hilda Lowe called requesting a refill on the following medications:  Requested Prescriptions     Pending Prescriptions Disp Refills    buprenorphine (BUTRANS) 7.5 MCG/HR PTWK 4 patch 0     Sig: Place 1 patch onto the skin once a week for 28 days. 7 days a week     Pharmacy verified: Ning Blake on [address] [place] [address] Drive, [address]  . pv      Date of last visit: 2/22/2021  Date of next visit (if applicable): 5/49/7830

## 2021-03-08 NOTE — TELEPHONE ENCOUNTER
OARRS reviewed. UDS: + for O-Desmethyl-Tramadol, Tramadol. Last seen: 2/22/2021.  Follow-up: 5/17/21

## 2021-04-12 ENCOUNTER — OFFICE VISIT (OUTPATIENT)
Dept: PULMONOLOGY | Age: 86
End: 2021-04-12
Payer: MEDICARE

## 2021-04-12 VITALS
OXYGEN SATURATION: 98 % | BODY MASS INDEX: 26.25 KG/M2 | HEIGHT: 68 IN | DIASTOLIC BLOOD PRESSURE: 62 MMHG | HEART RATE: 61 BPM | TEMPERATURE: 97.8 F | SYSTOLIC BLOOD PRESSURE: 122 MMHG | WEIGHT: 173.2 LBS

## 2021-04-12 DIAGNOSIS — J98.4 RESTRICTIVE LUNG DISEASE: ICD-10-CM

## 2021-04-12 DIAGNOSIS — Z91.81 HISTORY OF RECENT FALL: ICD-10-CM

## 2021-04-12 DIAGNOSIS — M41.9 KYPHOSCOLIOSIS: ICD-10-CM

## 2021-04-12 DIAGNOSIS — M54.17 LUMBOSACRAL RADICULITIS: ICD-10-CM

## 2021-04-12 DIAGNOSIS — J45.20 MILD INTERMITTENT ASTHMA WITHOUT COMPLICATION: Primary | ICD-10-CM

## 2021-04-12 DIAGNOSIS — G89.4 CHRONIC PAIN SYNDROME: ICD-10-CM

## 2021-04-12 PROCEDURE — 99213 OFFICE O/P EST LOW 20 MIN: CPT | Performed by: NURSE PRACTITIONER

## 2021-04-12 PROCEDURE — 1123F ACP DISCUSS/DSCN MKR DOCD: CPT | Performed by: NURSE PRACTITIONER

## 2021-04-12 PROCEDURE — G8427 DOCREV CUR MEDS BY ELIG CLIN: HCPCS | Performed by: NURSE PRACTITIONER

## 2021-04-12 PROCEDURE — 4040F PNEUMOC VAC/ADMIN/RCVD: CPT | Performed by: NURSE PRACTITIONER

## 2021-04-12 PROCEDURE — G8417 CALC BMI ABV UP PARAM F/U: HCPCS | Performed by: NURSE PRACTITIONER

## 2021-04-12 PROCEDURE — 1036F TOBACCO NON-USER: CPT | Performed by: NURSE PRACTITIONER

## 2021-04-12 RX ORDER — BUPRENORPHINE 7.5 UG/H
1 PATCH TRANSDERMAL WEEKLY
Qty: 4 PATCH | Refills: 0 | Status: SHIPPED | OUTPATIENT
Start: 2021-04-12 | End: 2021-05-04 | Stop reason: SDUPTHER

## 2021-04-12 ASSESSMENT — ENCOUNTER SYMPTOMS
COUGH: 1
BACK PAIN: 1
SHORTNESS OF BREATH: 1
ABDOMINAL PAIN: 0
NAUSEA: 0
DIARRHEA: 0
VOMITING: 0
WHEEZING: 0
EYES NEGATIVE: 1

## 2021-04-12 NOTE — TELEPHONE ENCOUNTER
OARRS reviewed. UDS: + for  Tramadol consistent. Last seen: 2/22/2021.  Follow-up:   Future Appointments   Date Time Provider Pancho Clark   4/12/2021 11:30 AM Steven Rawls   5/17/2021  1:00 PM MEGHANN Real - CNP N SRP[facility] Pain [facility] - [facility]   11/18/2021  1:15 PM Ana Epstein, [address] [facility]

## 2021-04-12 NOTE — PROGRESS NOTES
[facility]     Patient: Beni Carey, 80 y.o.   : 1934    Pt of Patel Paz CNP      Subjective     Chief Complaint   Patient presents with    Follow-up     asthma 6 month pulm follow up         HPI  Vladimir Bernabe is here for 6 month  follow up for persistent asthma  Currently on Breo 100/5  ( previously : Arnuity )   Seldomly uses rescue inhaler. Chronic cough- at baseline. Dry cough   No ER/ UC visits   Mod restriction and decreased DLCO PFT . Underlying scoliosis of spine   UtD with flu/pneumovax/COVID vaccines  Not on home O2, last 6 min walk 3/2019  Occ. SOB, \" not very often\"   Andrew Hegins at home about 2 weeks ago. Started having pain to right lower back/ side area. Did CXR at [facility], has appt with PCP tomorrow for results       Review of Systems   Constitutional: Negative for activity change, appetite change, chills, fatigue, fever and unexpected weight change. HENT: Negative. Eyes: Negative. Respiratory: Positive for cough and shortness of breath. Negative for wheezing. Cardiovascular: Negative for chest pain, palpitations and leg swelling. Gastrointestinal: Negative for abdominal pain, diarrhea, nausea and vomiting. Genitourinary: Negative. Musculoskeletal: Positive for arthralgias and back pain. Skin: Negative. Neurological: Negative. Hematological: Does not bruise/bleed easily. Psychiatric/Behavioral: Negative for sleep disturbance and suicidal ideas. Physical exam   /62 (Site: Right Upper Arm, Position: Sitting, Cuff Size: Medium Adult)   Pulse 61   Temp 97.8 °F (36.6 °C) (Temporal)   Ht 5' 8\" (1.727 m)   Wt 173 lb 3.2 oz (78.6 kg)   SpO2 98% Comment: Room air at rest  BMI 26.33 kg/m²      Wt Readings from Last 3 Encounters:   21 173 lb 3.2 oz (78.6 kg)   21 175 lb (79.4 kg)   20 173 lb (78.5 kg)     Physical Exam  Vitals signs and nursing note reviewed.    Constitutional: General: He is not in acute distress. Appearance: He is well-developed. HENT:      Mouth/Throat:      Lips: Pink. Mouth: Mucous membranes are moist.      Pharynx: Oropharynx is clear. No oropharyngeal exudate or posterior oropharyngeal erythema. Eyes:      Conjunctiva/sclera: Conjunctivae normal.   Neck:      Vascular: No JVD. Cardiovascular:      Rate and Rhythm: Normal rate and regular rhythm. Heart sounds: No murmur. No friction rub. Pulmonary:      Effort: Pulmonary effort is normal. No accessory muscle usage or respiratory distress. Breath sounds: Normal breath sounds. No wheezing, rhonchi or rales. Chest:      Chest wall: No tenderness. Musculoskeletal:         General: Tenderness (right mid back/ side ) present. Right lower leg: No edema. Left lower leg: No edema. Skin:     General: Skin is warm and dry. Capillary Refill: Capillary refill takes less than 2 seconds. Nails: There is no clubbing. Neurological:      Mental Status: He is alert. Psychiatric:         Mood and Affect: Mood normal.         Behavior: Behavior normal.         Thought Content: Thought content normal.         Judgment: Judgment normal.          Test results   Lung Nodule Screening     [] Qualifies    [x]Does not qualify   [] Declined    [] Completed    Assessment      Diagnosis Orders   1. Mild intermittent asthma without complication     2. Restrictive lung disease     3. Kyphoscoliosis       Plan    -F/u with PCP for CXR results; likely bruising/ musculoskeletal pain.  XR to r/o rib fracture.   -continue Breo / Albuterol  -encouragement to stay active  -avoidance of known triggers/ allergens    Will see Eulalio Silva in: 1 year    Electronically signed by MEGHANN Penny CNP on 4/12/2021 at 11:55 AM

## 2021-05-03 DIAGNOSIS — M54.17 LUMBOSACRAL RADICULITIS: ICD-10-CM

## 2021-05-03 DIAGNOSIS — G89.4 CHRONIC PAIN SYNDROME: ICD-10-CM

## 2021-05-03 NOTE — TELEPHONE ENCOUNTER
Kamran Elias called requesting a refill on the following medications:  Requested Prescriptions     Pending Prescriptions Disp Refills    buprenorphine (BUTRANS) 7.5 MCG/HR PTWK 4 patch 0     Sig: Place 1 patch onto the skin once a week for 28 days.  7 days a week     Pharmacy verified:  [facility]um [facility] on [address]    Date of last visit: 02/22/2021  Date of next visit (if applicable): 7/06/1817

## 2021-05-04 RX ORDER — BUPRENORPHINE 7.5 UG/H
1 PATCH TRANSDERMAL WEEKLY
Qty: 4 PATCH | Refills: 0 | Status: SHIPPED | OUTPATIENT
Start: 2021-05-10 | End: 2021-06-02 | Stop reason: SDUPTHER

## 2021-05-11 DIAGNOSIS — M54.17 LUMBOSACRAL RADICULITIS: ICD-10-CM

## 2021-05-11 DIAGNOSIS — M48.061 SPINAL STENOSIS OF LUMBAR REGION WITHOUT NEUROGENIC CLAUDICATION: ICD-10-CM

## 2021-05-11 DIAGNOSIS — G89.4 CHRONIC PAIN SYNDROME: ICD-10-CM

## 2021-05-11 DIAGNOSIS — M47.816 SPONDYLOSIS OF LUMBAR REGION WITHOUT MYELOPATHY OR RADICULOPATHY: ICD-10-CM

## 2021-05-11 DIAGNOSIS — M53.3 SACROILIAC JOINT DYSFUNCTION: ICD-10-CM

## 2021-05-11 RX ORDER — TRAMADOL HYDROCHLORIDE 50 MG/1
50 TABLET ORAL EVERY 6 HOURS PRN
Qty: 120 TABLET | Refills: 0 | Status: SHIPPED | OUTPATIENT
Start: 2021-05-11 | End: 2021-06-10

## 2021-05-11 NOTE — TELEPHONE ENCOUNTER
Jimmy Knight called requesting a refill on the following medications:  Requested Prescriptions     Pending Prescriptions Disp Refills    traMADol (ULTRAM) 50 MG tablet 120 tablet 0     Sig: Take 1 tablet by mouth every 6 hours as needed for Pain for up to 30 days. Pharmacy verified:QUETA wen      Date of last visit:   Date of next visit (if applicable): 1/36/9133

## 2021-05-17 ENCOUNTER — OFFICE VISIT (OUTPATIENT)
Dept: PHYSICAL MEDICINE AND REHAB | Age: 86
End: 2021-05-17
Payer: MEDICARE

## 2021-05-17 VITALS
BODY MASS INDEX: 26.22 KG/M2 | SYSTOLIC BLOOD PRESSURE: 126 MMHG | HEART RATE: 72 BPM | DIASTOLIC BLOOD PRESSURE: 70 MMHG | HEIGHT: 68 IN | WEIGHT: 173 LBS

## 2021-05-17 DIAGNOSIS — M48.061 SPINAL STENOSIS OF LUMBAR REGION WITHOUT NEUROGENIC CLAUDICATION: ICD-10-CM

## 2021-05-17 DIAGNOSIS — M54.17 LUMBOSACRAL RADICULITIS: Primary | ICD-10-CM

## 2021-05-17 DIAGNOSIS — G89.4 CHRONIC PAIN SYNDROME: ICD-10-CM

## 2021-05-17 DIAGNOSIS — M47.816 SPONDYLOSIS OF LUMBAR REGION WITHOUT MYELOPATHY OR RADICULOPATHY: ICD-10-CM

## 2021-05-17 DIAGNOSIS — M53.3 SACROILIAC JOINT DYSFUNCTION: ICD-10-CM

## 2021-05-17 DIAGNOSIS — F11.90 CHRONIC, CONTINUOUS USE OF OPIOIDS: ICD-10-CM

## 2021-05-17 PROCEDURE — 1036F TOBACCO NON-USER: CPT | Performed by: NURSE PRACTITIONER

## 2021-05-17 PROCEDURE — G8427 DOCREV CUR MEDS BY ELIG CLIN: HCPCS | Performed by: NURSE PRACTITIONER

## 2021-05-17 PROCEDURE — 1123F ACP DISCUSS/DSCN MKR DOCD: CPT | Performed by: NURSE PRACTITIONER

## 2021-05-17 PROCEDURE — 4040F PNEUMOC VAC/ADMIN/RCVD: CPT | Performed by: NURSE PRACTITIONER

## 2021-05-17 PROCEDURE — 99214 OFFICE O/P EST MOD 30 MIN: CPT | Performed by: NURSE PRACTITIONER

## 2021-05-17 PROCEDURE — G8417 CALC BMI ABV UP PARAM F/U: HCPCS | Performed by: NURSE PRACTITIONER

## 2021-05-17 RX ORDER — LIDOCAINE 50 MG/G
OINTMENT TOPICAL 3 TIMES DAILY PRN
Qty: 240 G | Refills: 5 | Status: SHIPPED | OUTPATIENT
Start: 2021-05-17 | End: 2021-06-16

## 2021-05-17 ASSESSMENT — ENCOUNTER SYMPTOMS
CONSTIPATION: 0
DIARRHEA: 0
WHEEZING: 0
COLOR CHANGE: 0
NAUSEA: 0
ABDOMINAL PAIN: 0
SORE THROAT: 0
SINUS PRESSURE: 0
EYE PAIN: 0
BACK PAIN: 1
VOMITING: 0
CHEST TIGHTNESS: 0
PHOTOPHOBIA: 0
RHINORRHEA: 0
COUGH: 0
SHORTNESS OF BREATH: 0

## 2021-05-17 NOTE — PROGRESS NOTES
[address] [place] [address]  Dept: [phone]  Dept Fax: [phone]: [phone]    Visit Date: 5/17/2021    Functionality Assessment/Goals Worksheet     On a scale of 0 (Does not Interfere) to 10 (Completely Interferes)     1. Which number describes how during the past week pain has interfered with       the following:  A. General Activity:  4  B. Mood: 0  C. Walking Ability:  5  D. Normal Work (Includes both work outside the home and housework):  6  E. Relations with Other People:   0  F. Sleep:   2  G. Enjoyment of Life:   3    2. Patient Prefers to Take their Pain Medications:     [x]  On a regular basis   []  Only when necessary    []  Does not take pain medications    3. What are the Patient's Goals/Expectations for Visiting Pain Management? []  Learn about my pain    [x]  Receive Medication   []  Physical Therapy     []  Treat Depression   []  Receive Injections    []  Treat Sleep   []  Deal with Anxiety and Stress   []  Treat Opoid Dependence/Addiction   []  Other:      HPI:   Jd Wu is a 80 y.o. male is here today for    Chief Complaint: Low back pain, Mid back pain, Right leg pain, Left leg pain, Hip pain and Knee pain     HPI   F/U continues to have  Low back mid back hip SI  BLE pain. He has had LESI L:5 left in past with  Great releif for 18 months. He has had other injections  without relief. He uses a cane. He has increased pain with walking standing bending lifting twisting turning. He continues to take Tramadol and Butrans patch states hi emy is manageable and tolerable. Medications reviewed. Patient denies side effects with medications. Patient states he is taking medications as prescribed. Hedenies receiving pain medications from other sources. He denies any ER visits since last visit.     Pain scale with out pain medications or at its worst is 7/10. Pain scale with pain medications or at its best is 4/10. Last dose of Tramadol Butrans patch  LACHO  was today  Drug screen reviewed from 2/2021and was appropriate  Pill count was completed today and was appropriate  Patient does not have naloxone available at home. Patient has not required use of naloxone at home since last office visit. The patienthas No Known Allergies. Subjective:      Review of Systems   Constitutional: Positive for activity change. Negative for appetite change, chills, diaphoresis, fatigue, fever and unexpected weight change. HENT: Negative for congestion, ear pain, hearing loss, mouth sores, nosebleeds, rhinorrhea, sinus pressure and sore throat. Eyes: Negative for photophobia, pain and visual disturbance. Respiratory: Negative for cough, chest tightness, shortness of breath and wheezing. PATRICIA has ASTHMA   Cardiovascular: Negative for chest pain and palpitations. 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 GERD   Endocrine: Negative for cold intolerance, heat intolerance, polydipsia, polyphagia and polyuria. DM   Genitourinary: Negative for decreased urine volume, difficulty urinating, frequency and hematuria. Prostate issues   Musculoskeletal: Positive for arthralgias, back pain, gait problem, joint swelling, myalgias, neck pain and neck stiffness. Skin: Positive for wound. Negative for color change and rash. Right elbow carpet burn skin tear, dry dressing intact   Allergic/Immunologic: Negative for food allergies and immunocompromised state. Neurological: Positive for weakness and numbness. Negative for dizziness, tremors, seizures, syncope, facial asymmetry, speech difficulty, light-headedness and headaches. Hematological: Does not bruise/bleed easily.    Psychiatric/Behavioral: Negative for agitation, behavioral problems, confusion, decreased concentration, dysphoric mood, hallucinations, self-injury, sleep disturbance and suicidal ideas. The patient is not nervous/anxious and is not hyperactive. Objective:     Vitals:    05/17/21 1301   BP: 126/70   Site: Left Upper Arm   Position: Sitting   Cuff Size: Medium Adult   Pulse: 72   Weight: 173 lb (78.5 kg)   Height: 5' 8\" (1.727 m)       Physical Exam  Vitals and nursing note reviewed. Constitutional:       General: He is not in acute distress. Appearance: He is well-developed. He is not diaphoretic. HENT:      Head: Normocephalic and atraumatic. Right Ear: External ear normal.      Left Ear: External ear normal.      Nose: Nose normal.      Mouth/Throat:      Pharynx: No oropharyngeal exudate. Eyes:      General: No scleral icterus. Right eye: No discharge. Left eye: No discharge. Conjunctiva/sclera: Conjunctivae normal.      Pupils: Pupils are equal, round, and reactive to light. Neck:      Thyroid: No thyromegaly. Cardiovascular:      Rate and Rhythm: Normal rate and regular rhythm. Heart sounds: Normal heart sounds. No murmur heard. No friction rub. No gallop. Pulmonary:      Effort: Pulmonary effort is normal. No respiratory distress. Breath sounds: Normal breath sounds. No wheezing or rales. Chest:      Chest wall: No tenderness. Abdominal:      General: Bowel sounds are normal. There is no distension. Palpations: Abdomen is soft. Tenderness: There is no abdominal tenderness. There is no guarding or rebound. Musculoskeletal:         General: Tenderness present. Right shoulder: Normal.      Left shoulder: Normal.      Right wrist: Normal.      Right hand: Normal.      Left hand: Normal.      Cervical back: Full passive range of motion without pain, normal range of motion and neck supple. No edema, erythema or rigidity. No muscular tenderness. Normal range of motion. Thoracic back: Tenderness present. Lumbar back: Spasms, tenderness and bony tenderness present. Decreased range of motion. Back:       Right hip: Tenderness and bony tenderness present. Decreased range of motion. Decreased strength. Left hip: Tenderness and bony tenderness present. Decreased range of motion. Decreased strength. Right upper leg: Tenderness present. Left upper leg: Normal.      Right knee: Swelling present. Decreased range of motion. Tenderness present. Left knee: Tenderness present. Right lower leg: Tenderness present. Left lower leg: Normal.      Right ankle: Tenderness present. Left ankle: Normal.      Right foot: Tenderness present. Left foot: Normal.      Comments: Right elbow skin tear form carpet burn dressing dry intact   Skin:     General: Skin is warm. Coloration: Skin is not pale. Findings: No erythema or rash. Neurological:      Mental Status: He is alert and oriented to person, place, and time. He is not disoriented. Cranial Nerves: Cranial nerves are intact. No cranial nerve deficit. Sensory: Sensation is intact. No sensory deficit. Motor: Motor function is intact. No atrophy or abnormal muscle tone. Coordination: Coordination is intact. Coordination normal.      Gait: Gait abnormal.      Deep Tendon Reflexes: Reflexes are normal and symmetric. Babinski sign absent on the right side. Reflex Scores:       Tricep reflexes are 2+ on the right side and 2+ on the left side. Bicep reflexes are 2+ on the right side and 2+ on the left side. Brachioradialis reflexes are 2+ on the right side and 2+ on the left side. Patellar reflexes are 2+ on the right side and 2+ on the left side. Achilles reflexes are 2+ on the right side and 2+ on the left side. Comments: Motor 4/5 RLE   SLR + RLE    Psychiatric:         Attention and Perception: Attention normal. He is attentive.          Mood and Affect: Mood and affect normal. Mood is not anxious or depressed. Affect is not labile, blunt, angry or inappropriate. Speech: Speech normal.         Behavior: Behavior normal. Behavior is not agitated, slowed, aggressive, withdrawn, hyperactive or combative. Thought Content: Thought content normal. Thought content is not paranoid or delusional. Thought content does not include homicidal or suicidal ideation. Thought content does not include homicidal or suicidal plan. Cognition and Memory: Cognition normal. Memory is impaired. He does not exhibit impaired recent memory or impaired remote memory. Judgment: Judgment normal. Judgment is not impulsive or inappropriate. Comments: forgetful       IONA test: +  Yeomans test: +  Gaenslen test+     Assessment:     1. Lumbosacral radiculitis    2. Spondylosis of lumbar region without myelopathy or radiculopathy    3. Spinal stenosis of lumbar region without neurogenic claudication    4. Sacroiliac joint dysfunction    5. Chronic pain syndrome    6. Chronic, continuous use of opioids            Plan:      · OARRS reviewed. Current MED: 21  · Patient was not offered naloxone for home. · Discussed long term side effects of medications, tolerance, dependency and addiction. · Previous UDS reviewed  · UDS preformed today for compliance. · Patient told can not receive any pain medications from any other source. · No evidence of abuse, diversion or aberrant behavior.  Medications and/or procedures to improve function and quality of life- patient understanding with this and that may not be pain free   Discussed with patient about safe storage of medications at home   Discussed possible weaning of medication dosing dependent on treatment/procedure results.  Testing: none   Procedures: refuses has had many without lasting  relief. He had LESI L5 left in past with 50% pain relief for 18 months.     Discussed with patient about risks with procedure including infection, reaction to medication, increased pain, or bleeding.  Medications:Tramadol Butrans patch    If patient is on blood thinners will need approval to holdN/A      Meds. Prescribed:   Orders Placed This Encounter   Medications    lidocaine (XYLOCAINE) 5 % ointment     Sig: Apply topically 3 times daily as needed for Pain Apply topically as needed. Dispense:  240 g     Refill:  5       Return in about 3 months (around 8/17/2021) for Follow up pain medications.                Electronically signed by MEGHANN Diamond CNP on5/17/2021 at 1:26 PM

## 2021-06-02 DIAGNOSIS — M54.17 LUMBOSACRAL RADICULITIS: ICD-10-CM

## 2021-06-02 DIAGNOSIS — G89.4 CHRONIC PAIN SYNDROME: ICD-10-CM

## 2021-06-02 RX ORDER — BUPRENORPHINE 7.5 UG/H
1 PATCH TRANSDERMAL WEEKLY
Qty: 4 PATCH | Refills: 0 | Status: SHIPPED | OUTPATIENT
Start: 2021-06-09 | End: 2021-07-06

## 2021-06-02 NOTE — TELEPHONE ENCOUNTER
Osmar May called requesting a refill on the following medications:  Requested Prescriptions     Pending Prescriptions Disp Refills    buprenorphine (BUTRANS) 7.5 MCG/HR PTWK 4 patch 0     Sig: Place 1 patch onto the skin once a week for 28 days.  7 days a week     Pharmacy verified:  [facility] [address]    Date of last visit: 05/17/2021  Date of next visit (if applicable): 2/76/9041

## 2021-07-05 DIAGNOSIS — M54.17 LUMBOSACRAL RADICULITIS: ICD-10-CM

## 2021-07-05 DIAGNOSIS — G89.4 CHRONIC PAIN SYNDROME: ICD-10-CM

## 2021-07-06 RX ORDER — BUPRENORPHINE 7.5 UG/H
PATCH TRANSDERMAL
Qty: 4 PATCH | Refills: 0 | Status: SHIPPED | OUTPATIENT
Start: 2021-07-06 | End: 2021-08-03

## 2021-07-06 NOTE — TELEPHONE ENCOUNTER
OARRS reviewed. UDS: + for O-Desmethyl-Tramadol, Tramadol. Last seen: 5/17/2021.  Follow-up:   Future Appointments   Date Time Provider Pancho Mayfieldi   8/16/2021  1:40 PM MEGHANN Thompson CNP N [facility]   11/18/2021  1:15 PM Farhat Persaud MD [facility] [address] [facility]   4/12/2022  1:00 PM MEGHANN Patterson 37

## 2021-07-27 DIAGNOSIS — M54.17 LUMBOSACRAL RADICULITIS: ICD-10-CM

## 2021-07-27 DIAGNOSIS — G89.4 CHRONIC PAIN SYNDROME: ICD-10-CM

## 2021-07-27 RX ORDER — BUPRENORPHINE 7.5 UG/H
PATCH TRANSDERMAL
Qty: 4 PATCH | Refills: 0 | Status: CANCELLED | OUTPATIENT
Start: 2021-07-27 | End: 2021-08-24

## 2021-07-27 NOTE — TELEPHONE ENCOUNTER
Vika Cruz called requesting a refill on the following medications:  Requested Prescriptions     Pending Prescriptions Disp Refills    buprenorphine (BUPRENEX) 7.5 MCG/HR PTWK 4 patch 0     [facility]      Date of last visit:   Date of next visit (if applicable): 8/31/9302

## 2021-07-29 RX ORDER — BUPRENORPHINE 7.5 UG/H
1 PATCH TRANSDERMAL WEEKLY
Qty: 4 PATCH | Refills: 0 | Status: SHIPPED | OUTPATIENT
Start: 2021-08-03 | End: 2021-08-27

## 2021-07-29 NOTE — TELEPHONE ENCOUNTER
OARRS reviewed. UDS: + for  Tramadol consistent. Last seen: 5/17/2021.  Follow-up:   Future Appointments   Date Time Provider Pancho Clark   8/16/2021  1:40 PM MEGHANN Porter CNP N [facility] Pain [facility] - [facility]   11/18/2021  1:15 PM Hollis Barragan MD [facility] [place] [facility] [address]   4/12/2022  1:00 PM MEGHANN Marie 37

## 2021-08-16 ENCOUNTER — OFFICE VISIT (OUTPATIENT)
Dept: PHYSICAL MEDICINE AND REHAB | Age: 86
End: 2021-08-16
Payer: MEDICARE

## 2021-08-16 VITALS
DIASTOLIC BLOOD PRESSURE: 78 MMHG | BODY MASS INDEX: 26.22 KG/M2 | WEIGHT: 173 LBS | HEIGHT: 68 IN | SYSTOLIC BLOOD PRESSURE: 124 MMHG

## 2021-08-16 DIAGNOSIS — G89.4 CHRONIC PAIN SYNDROME: ICD-10-CM

## 2021-08-16 DIAGNOSIS — M54.17 LUMBOSACRAL RADICULITIS: Primary | ICD-10-CM

## 2021-08-16 DIAGNOSIS — M47.816 SPONDYLOSIS OF LUMBAR REGION WITHOUT MYELOPATHY OR RADICULOPATHY: ICD-10-CM

## 2021-08-16 DIAGNOSIS — M53.3 SACROILIAC JOINT DYSFUNCTION: ICD-10-CM

## 2021-08-16 DIAGNOSIS — M48.061 SPINAL STENOSIS OF LUMBAR REGION WITHOUT NEUROGENIC CLAUDICATION: ICD-10-CM

## 2021-08-16 DIAGNOSIS — F11.90 CHRONIC, CONTINUOUS USE OF OPIOIDS: ICD-10-CM

## 2021-08-16 PROCEDURE — 99214 OFFICE O/P EST MOD 30 MIN: CPT | Performed by: NURSE PRACTITIONER

## 2021-08-16 PROCEDURE — G8417 CALC BMI ABV UP PARAM F/U: HCPCS | Performed by: NURSE PRACTITIONER

## 2021-08-16 PROCEDURE — G8428 CUR MEDS NOT DOCUMENT: HCPCS | Performed by: NURSE PRACTITIONER

## 2021-08-16 PROCEDURE — 4040F PNEUMOC VAC/ADMIN/RCVD: CPT | Performed by: NURSE PRACTITIONER

## 2021-08-16 PROCEDURE — 1123F ACP DISCUSS/DSCN MKR DOCD: CPT | Performed by: NURSE PRACTITIONER

## 2021-08-16 PROCEDURE — 1036F TOBACCO NON-USER: CPT | Performed by: NURSE PRACTITIONER

## 2021-08-16 RX ORDER — LIDOCAINE 50 MG/G
OINTMENT TOPICAL
Qty: 50 G | Refills: 2 | Status: SHIPPED | OUTPATIENT
Start: 2021-08-16 | End: 2022-04-25 | Stop reason: SDUPTHER

## 2021-08-16 ASSESSMENT — ENCOUNTER SYMPTOMS
ABDOMINAL PAIN: 0
WHEEZING: 0
SORE THROAT: 0
COLOR CHANGE: 0
PHOTOPHOBIA: 0
NAUSEA: 0
COUGH: 0
EYE PAIN: 0
RHINORRHEA: 0
SHORTNESS OF BREATH: 0
BACK PAIN: 1
CHEST TIGHTNESS: 0
CONSTIPATION: 0
SINUS PRESSURE: 0
VOMITING: 0
DIARRHEA: 0

## 2021-08-16 NOTE — PROGRESS NOTES
[address] [place] [address]  Dept: [phone]  Dept Fax: [phone]: [phone]    Visit Date: 8/16/2021    Functionality Assessment/Goals Worksheet     On a scale of 0 (Does not Interfere) to 10 (Completely Interferes)     1. Which number describes how during the past week pain has interfered with       the following:  A. General Activity:  5  B. Mood: 2  C. Walking Ability:  6  D. Normal Work (Includes both work outside the home and housework):  6  E. Relations with Other People:   2  F. Sleep:   2  G. Enjoyment of Life:   3    2. Patient Prefers to Take their Pain Medications:     [x]  On a regular basis   []  Only when necessary    []  Does not take pain medications    3. What are the Patient's Goals/Expectations for Visiting Pain Management? []  Learn about my pain    [x]  Receive Medication   []  Physical Therapy     []  Treat Depression   []  Receive Injections    []  Treat Sleep   []  Deal with Anxiety and Stress   []  Treat Opoid Dependence/Addiction   []  Other:      HPI:   Edmund Palencia is a 80 y.o. male is here today for    Chief Complaint: Low back pain, Mid back pain, Right leg pain, Left leg pain, Hip pain and Knee pain     HPI   F/U continues to have  Low back mid back hip SI  BLE pain. He has had LESI L:5 left in past with  Great releif for 18 months. He has had other injections  without relief. He uses a cane. He has increased pain with walking standing bending lifting twisting turning. He continues to take Tramadol and Butrans patch states hi emy is manageable and tolerable. Pain increases with bending, lifting, twisting , reaching, pushing, pulling, walking, standing, stairs and getting up and down.   Treatments Tried ice, heat, NSAIDS, narcotics, muscle relaxer, OTC rubs creams patches, steroid burst and injections  Pain Description sharp, stabbing, burning, throbbing, aching and numbness    Medications reviewed. Patient denies side effects with medications. Patient states he is taking medications as prescribed. Hedenies receiving pain medications from other sources. He denies any ER visits since last visit. Pain scale with out pain medications or at its worst is 5/10. Pain scale with pain medications or at its best is 2/10. Last dose of Butrans patch  Tramadol  was today Drug screen reviewed from 5/2021 and was appropriate  Pill count was not completed today and patient was educated on bringing in medications Pt is out of meds:   Patient does not have naloxone available at home. Patient has not required use of naloxone at home since last office visit. The patienthas No Known Allergies. Subjective:      Review of Systems   Constitutional: Positive for activity change. Negative for appetite change, chills, diaphoresis, fatigue, fever and unexpected weight change. HENT: Negative for congestion, ear pain, hearing loss, mouth sores, nosebleeds, rhinorrhea, sinus pressure and sore throat. Eyes: Negative for photophobia, pain and visual disturbance. Respiratory: Negative for cough, chest tightness, shortness of breath and wheezing. PATRICIA has ASTHMA   Cardiovascular: Negative for chest pain and palpitations. 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 GERD   Endocrine: Negative for cold intolerance, heat intolerance, polydipsia, polyphagia and polyuria. DM   Genitourinary: Negative for decreased urine volume, difficulty urinating, frequency and hematuria. Prostate issues   Musculoskeletal: Positive for arthralgias, back pain, gait problem, joint swelling, myalgias, neck pain and neck stiffness. Skin: Positive for wound. Negative for color change and rash.         Right elbow carpet burn skin tear, dry dressing intact Allergic/Immunologic: Negative for food allergies and immunocompromised state. Neurological: Positive for weakness and numbness. Negative for dizziness, tremors, seizures, syncope, facial asymmetry, speech difficulty, light-headedness and headaches. Hematological: Does not bruise/bleed easily. Psychiatric/Behavioral: Negative for agitation, behavioral problems, confusion, decreased concentration, dysphoric mood, hallucinations, self-injury, sleep disturbance and suicidal ideas. The patient is not nervous/anxious and is not hyperactive. Objective:     Vitals:    08/16/21 1333   BP: 124/78   Weight: 173 lb (78.5 kg)   Height: 5' 8\" (1.727 m)       Physical Exam  Vitals and nursing note reviewed. Constitutional:       General: He is not in acute distress. Appearance: He is well-developed. He is not diaphoretic. HENT:      Head: Normocephalic and atraumatic. Right Ear: External ear normal.      Left Ear: External ear normal.      Nose: Nose normal.      Mouth/Throat:      Pharynx: No oropharyngeal exudate. Eyes:      General: No scleral icterus. Right eye: No discharge. Left eye: No discharge. Conjunctiva/sclera: Conjunctivae normal.      Pupils: Pupils are equal, round, and reactive to light. Neck:      Thyroid: No thyromegaly. Cardiovascular:      Rate and Rhythm: Normal rate and regular rhythm. Heart sounds: Normal heart sounds. No murmur heard. No friction rub. No gallop. Pulmonary:      Effort: Pulmonary effort is normal. No respiratory distress. Breath sounds: Normal breath sounds. No wheezing or rales. Chest:      Chest wall: No tenderness. Abdominal:      General: Bowel sounds are normal. There is no distension. Palpations: Abdomen is soft. Tenderness: There is no abdominal tenderness. There is no guarding or rebound. Musculoskeletal:         General: Tenderness present.       Right shoulder: Normal.      Left shoulder: Normal. Right wrist: Normal.      Right hand: Normal.      Left hand: Normal.      Cervical back: Full passive range of motion without pain, normal range of motion and neck supple. No edema, erythema or rigidity. No muscular tenderness. Normal range of motion. Thoracic back: Tenderness present. Lumbar back: Spasms, tenderness and bony tenderness present. Decreased range of motion. Back:       Right hip: Tenderness and bony tenderness present. Decreased range of motion. Decreased strength. Left hip: Tenderness and bony tenderness present. Decreased range of motion. Decreased strength. Right upper leg: Tenderness present. Left upper leg: Normal.      Right knee: Swelling present. Decreased range of motion. Tenderness present. Left knee: Tenderness present. Right lower leg: Tenderness present. Left lower leg: Normal.      Right ankle: Tenderness present. Left ankle: Normal.      Right foot: Tenderness present. Left foot: Normal.      Comments: Right elbow skin tear form carpet burn dressing dry intact   Skin:     General: Skin is warm. Coloration: Skin is not pale. Findings: No erythema or rash. Neurological:      Mental Status: He is alert and oriented to person, place, and time. He is not disoriented. Cranial Nerves: Cranial nerves are intact. No cranial nerve deficit. Sensory: Sensation is intact. No sensory deficit. Motor: Motor function is intact. No atrophy or abnormal muscle tone. Coordination: Coordination is intact. Coordination normal.      Gait: Gait abnormal.      Deep Tendon Reflexes: Reflexes are normal and symmetric. Babinski sign absent on the right side. Reflex Scores:       Tricep reflexes are 2+ on the right side and 2+ on the left side. Bicep reflexes are 2+ on the right side and 2+ on the left side. Brachioradialis reflexes are 2+ on the right side and 2+ on the left side.        Patellar reflexes are 2+ on the right side and 2+ on the left side. Achilles reflexes are 2+ on the right side and 2+ on the left side. Comments: Motor 4/5 RLE   SLR + RLE    Psychiatric:         Attention and Perception: Attention normal. He is attentive. Mood and Affect: Mood and affect normal. Mood is not anxious or depressed. Affect is not labile, blunt, angry or inappropriate. Speech: Speech normal.         Behavior: Behavior normal. Behavior is not agitated, slowed, aggressive, withdrawn, hyperactive or combative. Thought Content: Thought content normal. Thought content is not paranoid or delusional. Thought content does not include homicidal or suicidal ideation. Thought content does not include homicidal or suicidal plan. Cognition and Memory: Cognition normal. Memory is impaired. He does not exhibit impaired recent memory or impaired remote memory. Judgment: Judgment normal. Judgment is not impulsive or inappropriate. Comments: forgetful       IONA test: +  Yeomans test: +  Gaenslen test:+     Assessment:     1. Lumbosacral radiculitis    2. Spondylosis of lumbar region without myelopathy or radiculopathy    3. Spinal stenosis of lumbar region without neurogenic claudication    4. Sacroiliac joint dysfunction    5. Chronic, continuous use of opioids    6. Chronic pain syndrome            Plan:      · OARRS reviewed. Current MED:22  · Patient was not offered naloxone for home. · Discussed long term side effects of medications, tolerance, dependency and addiction. · Previous UDS reviewed  · UDS preformed today for compliance. · Patient told can not receive any pain medications from any other source. · No evidence of abuse, diversion or aberrant behavior.    Medications and/or procedures to improve function and quality of life- patient understanding with this and that may not be pain free   Discussed with patient about safe storage of medications at home   Discussed possible weaning of medication dosing dependent on treatment/procedure results.  Testing: none   Procedures: none   Discussed with patient about risks with procedure including infection, reaction to medication, increased pain, or bleeding.  Medications:Lidocaine oint Tramadol Butrans Neurontin    If patient is on blood thinners will need approval to hold:N/A      Meds. Prescribed:   Orders Placed This Encounter   Medications    lidocaine (XYLOCAINE) 5 % ointment     Sig: Apply topically as needed. TID PRN     Dispense:  50 g     Refill:  2       Return in about 3 months (around 11/16/2021) for Follow up pain medications.                Electronically signed by MEGHANN Garcia CNP on8/16/2021 at 2:17 PM

## 2021-08-27 DIAGNOSIS — M54.17 LUMBOSACRAL RADICULITIS: ICD-10-CM

## 2021-08-27 DIAGNOSIS — G89.4 CHRONIC PAIN SYNDROME: ICD-10-CM

## 2021-08-27 RX ORDER — BUPRENORPHINE 7.5 UG/H
PATCH TRANSDERMAL
Qty: 4 PATCH | Refills: 0 | Status: SHIPPED | OUTPATIENT
Start: 2021-09-02 | End: 2021-09-24

## 2021-08-27 NOTE — TELEPHONE ENCOUNTER
OARRS reviewed. UDS: + for  O-Desmethyl-Tramadol, Tramadol  Last seen: 8/16/2021.  Follow-up: 11/1/2021

## 2021-09-23 DIAGNOSIS — G89.4 CHRONIC PAIN SYNDROME: ICD-10-CM

## 2021-09-23 DIAGNOSIS — M54.17 LUMBOSACRAL RADICULITIS: ICD-10-CM

## 2021-09-24 RX ORDER — BUPRENORPHINE 7.5 UG/H
1 PATCH TRANSDERMAL WEEKLY
Qty: 4 PATCH | Refills: 0 | Status: SHIPPED | OUTPATIENT
Start: 2021-09-24 | End: 2021-10-27

## 2021-10-27 DIAGNOSIS — M54.17 LUMBOSACRAL RADICULITIS: ICD-10-CM

## 2021-10-27 DIAGNOSIS — G89.4 CHRONIC PAIN SYNDROME: ICD-10-CM

## 2021-10-27 RX ORDER — BUPRENORPHINE 7.5 UG/H
1 PATCH TRANSDERMAL WEEKLY
Qty: 4 PATCH | Refills: 0 | Status: SHIPPED | OUTPATIENT
Start: 2021-10-27 | End: 2021-12-03

## 2021-11-01 ENCOUNTER — OFFICE VISIT (OUTPATIENT)
Dept: PHYSICAL MEDICINE AND REHAB | Age: 86
End: 2021-11-01
Payer: MEDICARE

## 2021-11-01 VITALS
SYSTOLIC BLOOD PRESSURE: 116 MMHG | HEART RATE: 78 BPM | DIASTOLIC BLOOD PRESSURE: 70 MMHG | HEIGHT: 68 IN | WEIGHT: 175 LBS | BODY MASS INDEX: 26.52 KG/M2

## 2021-11-01 DIAGNOSIS — M54.17 LUMBOSACRAL RADICULITIS: ICD-10-CM

## 2021-11-01 DIAGNOSIS — M53.3 SACROILIAC JOINT DYSFUNCTION: ICD-10-CM

## 2021-11-01 DIAGNOSIS — F11.90 CHRONIC, CONTINUOUS USE OF OPIOIDS: ICD-10-CM

## 2021-11-01 DIAGNOSIS — M48.061 SPINAL STENOSIS OF LUMBAR REGION WITHOUT NEUROGENIC CLAUDICATION: ICD-10-CM

## 2021-11-01 DIAGNOSIS — G89.4 CHRONIC PAIN SYNDROME: ICD-10-CM

## 2021-11-01 DIAGNOSIS — M47.816 SPONDYLOSIS OF LUMBAR REGION WITHOUT MYELOPATHY OR RADICULOPATHY: Primary | ICD-10-CM

## 2021-11-01 PROCEDURE — 1123F ACP DISCUSS/DSCN MKR DOCD: CPT | Performed by: NURSE PRACTITIONER

## 2021-11-01 PROCEDURE — G8417 CALC BMI ABV UP PARAM F/U: HCPCS | Performed by: NURSE PRACTITIONER

## 2021-11-01 PROCEDURE — 99214 OFFICE O/P EST MOD 30 MIN: CPT | Performed by: NURSE PRACTITIONER

## 2021-11-01 PROCEDURE — G8427 DOCREV CUR MEDS BY ELIG CLIN: HCPCS | Performed by: NURSE PRACTITIONER

## 2021-11-01 PROCEDURE — 1036F TOBACCO NON-USER: CPT | Performed by: NURSE PRACTITIONER

## 2021-11-01 PROCEDURE — G8484 FLU IMMUNIZE NO ADMIN: HCPCS | Performed by: NURSE PRACTITIONER

## 2021-11-01 PROCEDURE — 4040F PNEUMOC VAC/ADMIN/RCVD: CPT | Performed by: NURSE PRACTITIONER

## 2021-11-01 RX ORDER — TRAMADOL HYDROCHLORIDE 50 MG/1
50 TABLET ORAL EVERY 6 HOURS PRN
Qty: 120 TABLET | Refills: 0 | Status: SHIPPED | OUTPATIENT
Start: 2021-11-01 | End: 2021-12-30

## 2021-11-01 ASSESSMENT — ENCOUNTER SYMPTOMS
EYE PAIN: 0
SORE THROAT: 0
VOMITING: 0
COUGH: 0
ABDOMINAL PAIN: 0
SHORTNESS OF BREATH: 0
WHEEZING: 0
CHEST TIGHTNESS: 0
BACK PAIN: 1
PHOTOPHOBIA: 0
COLOR CHANGE: 0
RHINORRHEA: 0
SINUS PRESSURE: 0
NAUSEA: 0
DIARRHEA: 0
CONSTIPATION: 0

## 2021-11-01 NOTE — PROGRESS NOTES
[facility] [place] [address]  Dept: [phone]  Dept Fax: [phone]: [phone]    Visit Date: 11/1/2021    Functionality Assessment/Goals Worksheet     On a scale of 0 (Does not Interfere) to 10 (Completely Interferes)     1. Which number describes how during the past week pain has interfered with       the following:  A. General Activity:  4  B. Mood: 2  C. Walking Ability:  4  D. Normal Work (Includes both work outside the home and housework):  4  E. Relations with Other People:   2  F. Sleep:   2  G. Enjoyment of Life:   3    2. Patient Prefers to Take their Pain Medications:     [x]  On a regular basis   []  Only when necessary    []  Does not take pain medications    3. What are the Patient's Goals/Expectations for Visiting Pain Management? []  Learn about my pain    [x]  Receive Medication   []  Physical Therapy     []  Treat Depression   []  Receive Injections    []  Treat Sleep   []  Deal with Anxiety and Stress   []  Treat Opoid Dependence/Addiction   []  Other:      HPI:   Donel Closs is a 80 y.o. male is here today for    Chief Complaint: Low back pain, Mid back pain, Right leg pain, Left leg pain, Hip pain and Knee pain     HPI   HPI   F/U continues to have low back mid back hip SI  BLE pain. He has had LESI L5 left in past with great releif for 18 months. He has had other injections  without relief. He uses a cane. He has increased pain with walking standing bending lifting twisting turning. He continues to take Tramadol and Butrans patch states his pain is manageable and tolerable. Pain increases with bending, lifting, twisting , reaching, pushing, pulling, walking, standing, stairs and getting up and down.   Treatments Tried ice, heat, NSAIDS, narcotics, muscle relaxer, OTC rubs creams patches, steroid burst and injections  Pain Description sharp, stabbing, burning, throbbing, aching and numbness    Medications reviewed. Patient denies side effects with medications. Patient states he is taking medications as prescribed. Hedenies receiving pain medications from other sources. He denies any ER visits since last visit. Pain scale with out pain medications or at its worst is 5/10. Pain scale with pain medications or at its best is 0/10. Last dose of Tramadol  Butrans patch LACHO was today Drug screen reviewed from 8/2021and was appropriate  Pill count was completed today and was appropriate Pt is out of meds:   Patient does not have naloxone available at home. Patient has not required use of naloxone at home since last office visit. The patienthas No Known Allergies. Subjective:      Review of Systems   Constitutional: Positive for activity change. Negative for appetite change, chills, diaphoresis, fatigue, fever and unexpected weight change. HENT: Negative for congestion, ear pain, hearing loss, mouth sores, nosebleeds, rhinorrhea, sinus pressure and sore throat. Eyes: Negative for photophobia, pain and visual disturbance. Respiratory: Negative for cough, chest tightness, shortness of breath and wheezing. PATRICIA has ASTHMA   Cardiovascular: Negative for chest pain and palpitations. 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 GERD   Endocrine: Negative for cold intolerance, heat intolerance, polydipsia, polyphagia and polyuria. DM   Genitourinary: Negative for decreased urine volume, difficulty urinating, frequency and hematuria. Prostate issues   Musculoskeletal: Positive for arthralgias, back pain, gait problem, joint swelling, myalgias, neck pain and neck stiffness. Skin: Positive for wound. Negative for color change and rash.         Right elbow carpet burn skin tear, dry dressing intact   Allergic/Immunologic: Negative for food allergies and immunocompromised state. Neurological: Positive for weakness and numbness. Negative for dizziness, tremors, seizures, syncope, facial asymmetry, speech difficulty, light-headedness and headaches. Hematological: Does not bruise/bleed easily. Psychiatric/Behavioral: Negative for agitation, behavioral problems, confusion, decreased concentration, dysphoric mood, hallucinations, self-injury, sleep disturbance and suicidal ideas. The patient is not nervous/anxious and is not hyperactive. Objective:     Vitals:    11/01/21 1312   BP: 116/70   Site: Left Upper Arm   Position: Sitting   Cuff Size: Medium Adult   Pulse: 78   Weight: 175 lb (79.4 kg)   Height: 5' 8\" (1.727 m)       Physical Exam  Vitals and nursing note reviewed. Constitutional:       General: He is not in acute distress. Appearance: He is well-developed. He is not diaphoretic. HENT:      Head: Normocephalic and atraumatic. Right Ear: External ear normal.      Left Ear: External ear normal.      Nose: Nose normal.      Mouth/Throat:      Pharynx: No oropharyngeal exudate. Eyes:      General: No scleral icterus. Right eye: No discharge. Left eye: No discharge. Conjunctiva/sclera: Conjunctivae normal.      Pupils: Pupils are equal, round, and reactive to light. Neck:      Thyroid: No thyromegaly. Cardiovascular:      Rate and Rhythm: Normal rate and regular rhythm. Heart sounds: Normal heart sounds. No murmur heard. No friction rub. No gallop. Pulmonary:      Effort: Pulmonary effort is normal. No respiratory distress. Breath sounds: Normal breath sounds. No wheezing or rales. Chest:      Chest wall: No tenderness. Abdominal:      General: Bowel sounds are normal. There is no distension. Palpations: Abdomen is soft. Tenderness: There is no abdominal tenderness. There is no guarding or rebound. Musculoskeletal:         General: Tenderness present.       Right shoulder: Normal.      Left shoulder: Normal.      Right wrist: Normal.      Right hand: Normal.      Left hand: Normal.      Cervical back: Full passive range of motion without pain, normal range of motion and neck supple. No edema, erythema or rigidity. No muscular tenderness. Normal range of motion. Thoracic back: Tenderness present. Lumbar back: Spasms, tenderness and bony tenderness present. Decreased range of motion. Back:       Right hip: Tenderness and bony tenderness present. Decreased range of motion. Decreased strength. Left hip: Tenderness and bony tenderness present. Decreased range of motion. Decreased strength. Right upper leg: Tenderness present. Left upper leg: Normal.      Right knee: Swelling present. Decreased range of motion. Tenderness present. Left knee: Tenderness present. Right lower leg: Tenderness present. Left lower leg: Normal.      Right ankle: Tenderness present. Left ankle: Normal.      Right foot: Tenderness present. Left foot: Normal.      Comments: Right elbow skin tear form carpet burn dressing dry intact   Skin:     General: Skin is warm. Coloration: Skin is not pale. Findings: No erythema or rash. Neurological:      Mental Status: He is alert and oriented to person, place, and time. He is not disoriented. Cranial Nerves: Cranial nerves are intact. No cranial nerve deficit. Sensory: Sensation is intact. No sensory deficit. Motor: Motor function is intact. No atrophy or abnormal muscle tone. Coordination: Coordination is intact. Coordination normal.      Gait: Gait abnormal.      Deep Tendon Reflexes: Reflexes are normal and symmetric. Babinski sign absent on the right side. Reflex Scores:       Tricep reflexes are 2+ on the right side and 2+ on the left side. Bicep reflexes are 2+ on the right side and 2+ on the left side.        Brachioradialis reflexes are 2+ on the right side and 2+ on the left side. Patellar reflexes are 2+ on the right side and 2+ on the left side. Achilles reflexes are 2+ on the right side and 2+ on the left side. Comments: Motor 4/5 RLE   SLR + RLE    Psychiatric:         Attention and Perception: Attention normal. He is attentive. Mood and Affect: Mood and affect normal. Mood is not anxious or depressed. Affect is not labile, blunt, angry or inappropriate. Speech: Speech normal.         Behavior: Behavior normal. Behavior is not agitated, slowed, aggressive, withdrawn, hyperactive or combative. Thought Content: Thought content normal. Thought content is not paranoid or delusional. Thought content does not include homicidal or suicidal ideation. Thought content does not include homicidal or suicidal plan. Cognition and Memory: Cognition normal. Memory is impaired. He does not exhibit impaired recent memory or impaired remote memory. Judgment: Judgment normal. Judgment is not impulsive or inappropriate. Comments: forgetful       IONA  Patricks test: +       Assessment:     1. Spondylosis of lumbar region without myelopathy or radiculopathy    2. Lumbosacral radiculitis    3. Spinal stenosis of lumbar region without neurogenic claudication    4. Sacroiliac joint dysfunction    5. Chronic, continuous use of opioids    6. Chronic pain syndrome            Plan:      · OARRS reviewed. Current MED: 20  · Patient was not offered naloxone for home. · Discussed long term side effects of medications, tolerance, dependency and addiction. · Previous UDS reviewed  · UDS preformed today for compliance. · Patient told can not receive any pain medications from any other source. · No evidence of abuse, diversion or aberrant behavior.    Medications and/or procedures to improve function and quality of life- patient understanding with this and that may not be pain free   Discussed with patient about safe storage of medications at home   Discussed possible weaning of medication dosing dependent on treatment/procedure results.  Testing: none   Procedures: none   Discussed with patient about risks with procedure including infection, reaction to medication, increased pain, or bleeding.  Medications:Butrans patch and Tramadol PRN sparingly    If patient is on blood thinners will need approval to hold:N/A      Meds. Prescribed:   Orders Placed This Encounter   Medications    traMADol (ULTRAM) 50 MG tablet     Sig: Take 1 tablet by mouth every 6 hours as needed for Pain for up to 30 days. Dispense:  120 tablet     Refill:  0     Reduce doses taken as pain becomes manageable       Return in about 3 months (around 2/1/2022) for Follow up pain medications.                Electronically signed by MEGHANN Dick CNP on11/1/2021 at 1:29 PM

## 2021-12-03 DIAGNOSIS — G89.4 CHRONIC PAIN SYNDROME: ICD-10-CM

## 2021-12-03 DIAGNOSIS — M54.17 LUMBOSACRAL RADICULITIS: ICD-10-CM

## 2021-12-03 RX ORDER — TAMSULOSIN HYDROCHLORIDE 0.4 MG/1
CAPSULE ORAL
Qty: 180 CAPSULE | Refills: 3 | Status: SHIPPED | OUTPATIENT
Start: 2021-12-03

## 2021-12-03 RX ORDER — FLUTICASONE FUROATE AND VILANTEROL TRIFENATATE 100; 25 UG/1; UG/1
POWDER RESPIRATORY (INHALATION)
Qty: 60 EACH | Refills: 3 | Status: SHIPPED | OUTPATIENT
Start: 2021-12-03

## 2021-12-03 RX ORDER — BUPRENORPHINE 7.5 UG/H
1 PATCH TRANSDERMAL WEEKLY
Qty: 4 PATCH | Refills: 0 | Status: SHIPPED | OUTPATIENT
Start: 2021-12-03 | End: 2021-12-28

## 2021-12-03 NOTE — TELEPHONE ENCOUNTER
Abraham Sheehan called requesting a refill on the following medications:  Requested Prescriptions     Pending Prescriptions Disp Refills    tamsulosin (FLOMAX) 0.4 MG capsule [Pharmacy Med Name: TAMSULOSIN 0.4MG CAPSULES] 180 capsule 3     Sig: TAKE [id number] verified:  .pv      Date of last visit: 11/19/2020  Date of next visit (if applicable):  He will call back to schedule appointment

## 2021-12-28 DIAGNOSIS — M54.17 LUMBOSACRAL RADICULITIS: ICD-10-CM

## 2021-12-28 DIAGNOSIS — G89.4 CHRONIC PAIN SYNDROME: ICD-10-CM

## 2021-12-28 RX ORDER — BUPRENORPHINE 7.5 UG/H
1 PATCH TRANSDERMAL WEEKLY
Qty: 4 PATCH | Refills: 0 | Status: SHIPPED | OUTPATIENT
Start: 2022-01-02 | End: 2022-01-31 | Stop reason: SDUPTHER

## 2021-12-29 DIAGNOSIS — M54.17 LUMBOSACRAL RADICULITIS: ICD-10-CM

## 2021-12-29 DIAGNOSIS — F11.90 CHRONIC, CONTINUOUS USE OF OPIOIDS: ICD-10-CM

## 2021-12-29 DIAGNOSIS — M47.816 SPONDYLOSIS OF LUMBAR REGION WITHOUT MYELOPATHY OR RADICULOPATHY: ICD-10-CM

## 2021-12-29 DIAGNOSIS — G89.4 CHRONIC PAIN SYNDROME: ICD-10-CM

## 2021-12-29 DIAGNOSIS — M48.061 SPINAL STENOSIS OF LUMBAR REGION WITHOUT NEUROGENIC CLAUDICATION: ICD-10-CM

## 2021-12-29 DIAGNOSIS — M53.3 SACROILIAC JOINT DYSFUNCTION: ICD-10-CM

## 2021-12-30 RX ORDER — TRAMADOL HYDROCHLORIDE 50 MG/1
50 TABLET ORAL EVERY 6 HOURS PRN
Qty: 120 TABLET | Refills: 0 | Status: SHIPPED | OUTPATIENT
Start: 2021-12-30 | End: 2022-04-04

## 2022-01-05 RX ORDER — FINASTERIDE 5 MG/1
5 TABLET, FILM COATED ORAL DAILY
Qty: 90 TABLET | Refills: 3 | Status: SHIPPED | OUTPATIENT
Start: 2022-01-05

## 2022-01-05 NOTE — TELEPHONE ENCOUNTER
Sean Rojo called requesting a refill on the following medications:  Requested Prescriptions     Pending Prescriptions Disp Refills    finasteride (PROSCAR) 5 MG tablet [Pharmacy Med Name: FINASTERIDE 5MG TABLETS] 90 tablet 3     Sig: TAKE [id number] verified:  .[email or web address]      Date of last visit: 11/19/2020  Date of next visit (if applicable): Patient will call the office back to reschedule the appointment

## 2022-01-31 ENCOUNTER — OFFICE VISIT (OUTPATIENT)
Dept: PHYSICAL MEDICINE AND REHAB | Age: 87
End: 2022-01-31
Payer: MEDICARE

## 2022-01-31 VITALS
HEART RATE: 68 BPM | HEIGHT: 68 IN | BODY MASS INDEX: 26.07 KG/M2 | WEIGHT: 172 LBS | DIASTOLIC BLOOD PRESSURE: 62 MMHG | SYSTOLIC BLOOD PRESSURE: 110 MMHG

## 2022-01-31 DIAGNOSIS — G89.4 CHRONIC PAIN SYNDROME: ICD-10-CM

## 2022-01-31 DIAGNOSIS — M54.17 LUMBOSACRAL RADICULITIS: ICD-10-CM

## 2022-01-31 DIAGNOSIS — M53.3 SACROILIAC JOINT DYSFUNCTION: ICD-10-CM

## 2022-01-31 DIAGNOSIS — M47.816 SPONDYLOSIS OF LUMBAR REGION WITHOUT MYELOPATHY OR RADICULOPATHY: Primary | ICD-10-CM

## 2022-01-31 DIAGNOSIS — F11.90 CHRONIC, CONTINUOUS USE OF OPIOIDS: ICD-10-CM

## 2022-01-31 DIAGNOSIS — M48.061 SPINAL STENOSIS OF LUMBAR REGION WITHOUT NEUROGENIC CLAUDICATION: ICD-10-CM

## 2022-01-31 PROCEDURE — 1036F TOBACCO NON-USER: CPT | Performed by: NURSE PRACTITIONER

## 2022-01-31 PROCEDURE — 1123F ACP DISCUSS/DSCN MKR DOCD: CPT | Performed by: NURSE PRACTITIONER

## 2022-01-31 PROCEDURE — G8427 DOCREV CUR MEDS BY ELIG CLIN: HCPCS | Performed by: NURSE PRACTITIONER

## 2022-01-31 PROCEDURE — G8417 CALC BMI ABV UP PARAM F/U: HCPCS | Performed by: NURSE PRACTITIONER

## 2022-01-31 PROCEDURE — 99214 OFFICE O/P EST MOD 30 MIN: CPT | Performed by: NURSE PRACTITIONER

## 2022-01-31 PROCEDURE — G8484 FLU IMMUNIZE NO ADMIN: HCPCS | Performed by: NURSE PRACTITIONER

## 2022-01-31 PROCEDURE — 4040F PNEUMOC VAC/ADMIN/RCVD: CPT | Performed by: NURSE PRACTITIONER

## 2022-01-31 RX ORDER — BUPRENORPHINE 7.5 UG/H
1 PATCH TRANSDERMAL WEEKLY
Qty: 4 PATCH | Refills: 0 | Status: SHIPPED | OUTPATIENT
Start: 2022-01-31 | End: 2022-02-09

## 2022-01-31 ASSESSMENT — ENCOUNTER SYMPTOMS
NAUSEA: 0
CONSTIPATION: 0
ABDOMINAL PAIN: 0
RHINORRHEA: 0
PHOTOPHOBIA: 0
SORE THROAT: 0
COUGH: 0
CHEST TIGHTNESS: 0
DIARRHEA: 0
SHORTNESS OF BREATH: 0
COLOR CHANGE: 0
SINUS PRESSURE: 0
EYE PAIN: 0
BACK PAIN: 1
WHEEZING: 0
VOMITING: 0

## 2022-01-31 NOTE — PROGRESS NOTES
and headaches. Hematological: Does not bruise/bleed easily. Psychiatric/Behavioral: Negative for agitation, behavioral problems, confusion, decreased concentration, dysphoric mood, hallucinations, self-injury, sleep disturbance and suicidal ideas. The patient is not nervous/anxious and is not hyperactive. Objective:     Vitals:    01/31/22 1314   BP: 110/62   Site: Left Upper Arm   Position: Sitting   Cuff Size: Medium Adult   Pulse: 68   Weight: 172 lb (78 kg)   Height: 5' 8\" (1.727 m)       Physical Exam  Vitals and nursing note reviewed. Constitutional:       General: He is not in acute distress. Appearance: He is well-developed. He is not diaphoretic. HENT:      Head: Normocephalic and atraumatic. Right Ear: External ear normal.      Left Ear: External ear normal.      Nose: Nose normal.      Mouth/Throat:      Pharynx: No oropharyngeal exudate. Eyes:      General: No scleral icterus. Right eye: No discharge. Left eye: No discharge. Conjunctiva/sclera: Conjunctivae normal.      Pupils: Pupils are equal, round, and reactive to light. Neck:      Thyroid: No thyromegaly. Cardiovascular:      Rate and Rhythm: Normal rate and regular rhythm. Heart sounds: Normal heart sounds. No murmur heard. No friction rub. No gallop. Pulmonary:      Effort: Pulmonary effort is normal. No respiratory distress. Breath sounds: Normal breath sounds. No wheezing or rales. Chest:      Chest wall: No tenderness. Abdominal:      General: Bowel sounds are normal. There is no distension. Palpations: Abdomen is soft. Tenderness: There is no abdominal tenderness. There is no guarding or rebound. Musculoskeletal:         General: Tenderness present.       Right shoulder: Normal.      Left shoulder: Normal.      Right wrist: Normal.      Right hand: Normal.      Left hand: Normal.      Cervical back: Full passive range of motion without pain, normal range of motion and neck supple. No edema, erythema or rigidity. No muscular tenderness. Normal range of motion. Thoracic back: Tenderness present. Lumbar back: Spasms, tenderness and bony tenderness present. Decreased range of motion. Back:       Right hip: Tenderness and bony tenderness present. Decreased range of motion. Decreased strength. Left hip: Tenderness and bony tenderness present. Decreased range of motion. Decreased strength. Right upper leg: Tenderness present. Left upper leg: Normal.      Right knee: Swelling present. Decreased range of motion. Tenderness present. Left knee: Tenderness present. Right lower leg: Tenderness present. Left lower leg: Normal.      Right ankle: Tenderness present. Left ankle: Normal.      Right foot: Tenderness present. Left foot: Normal.      Comments: Right elbow skin tear form carpet burn dressing dry intact   Skin:     General: Skin is warm. Coloration: Skin is not pale. Findings: No erythema or rash. Neurological:      Mental Status: He is alert and oriented to person, place, and time. He is not disoriented. Cranial Nerves: Cranial nerves are intact. No cranial nerve deficit. Sensory: Sensation is intact. No sensory deficit. Motor: Motor function is intact. No atrophy or abnormal muscle tone. Coordination: Coordination is intact. Coordination normal.      Gait: Gait abnormal.      Deep Tendon Reflexes: Reflexes are normal and symmetric. Babinski sign absent on the right side. Reflex Scores:       Tricep reflexes are 2+ on the right side and 2+ on the left side. Bicep reflexes are 2+ on the right side and 2+ on the left side. Brachioradialis reflexes are 2+ on the right side and 2+ on the left side. Patellar reflexes are 2+ on the right side and 2+ on the left side. Achilles reflexes are 2+ on the right side and 2+ on the left side.      Comments: Motor 4/5 RLE SLR + RLE    Psychiatric:         Attention and Perception: Attention normal. He is attentive. Mood and Affect: Mood and affect normal. Mood is not anxious or depressed. Affect is not labile, blunt, angry or inappropriate. Speech: Speech normal.         Behavior: Behavior normal. Behavior is not agitated, slowed, aggressive, withdrawn, hyperactive or combative. Thought Content: Thought content normal. Thought content is not paranoid or delusional. Thought content does not include homicidal or suicidal ideation. Thought content does not include homicidal or suicidal plan. Cognition and Memory: Cognition normal. Memory is impaired. He does not exhibit impaired recent memory or impaired remote memory. Judgment: Judgment normal. Judgment is not impulsive or inappropriate. Comments: forgetful       IONA  Patricks test  positive  Yeoman's  positive  Gaenslen's  positive  Kemps  positive         Assessment:     1. Spondylosis of lumbar region without myelopathy or radiculopathy    2. Lumbosacral radiculitis    3. Spinal stenosis of lumbar region without neurogenic claudication    4. Sacroiliac joint dysfunction    5. Chronic, continuous use of opioids    6. Chronic pain syndrome            Plan:      · OARRS reviewed. Current MED:18  · Patient was not offered naloxone for home. · Discussed long term side effects of medications, tolerance, dependency and addiction. · Previous UDS reviewed  · UDS preformed today for compliance. · Patient told can not receive any pain medications from any other source. · No evidence of abuse, diversion or aberrant behavior.  Medications and/or procedures to improve function and quality of life- patient understanding with this and that may not be pain free   Discussed with patient about safe storage of medications at home   Discussed possible weaning of medication dosing dependent on treatment/procedure results.     Testing: none   Procedures: none   Discussed with patient about risks with procedure including infection, reaction to medication, increased pain, or bleeding.  Medications:Butrans Tramadol       Meds. Prescribed:   Orders Placed This Encounter   Medications    buprenorphine (BUPRENEX) 7.5 MCG/HR PTWK     Sig: Place 1 patch onto the skin once a week for 30 days. Dispense:  4 patch     Refill:  0       Return in about 3 months (around 4/30/2022) for Follow up pain medications.                Electronically signed by MEGHANN Leavitt CNP on1/31/2022 at 1:29 PM

## 2022-02-08 DIAGNOSIS — M54.17 LUMBOSACRAL RADICULITIS: ICD-10-CM

## 2022-02-08 DIAGNOSIS — G89.4 CHRONIC PAIN SYNDROME: ICD-10-CM

## 2022-02-09 RX ORDER — BUPRENORPHINE 7.5 UG/H
1 PATCH TRANSDERMAL WEEKLY
Qty: 4 PATCH | Refills: 0 | Status: SHIPPED | OUTPATIENT
Start: 2022-02-09 | End: 2022-04-14

## 2022-02-09 NOTE — TELEPHONE ENCOUNTER
OARRS reviewed. UDS: + for  Tramadol consistent. Last seen: 1/31/2022.  Follow-up:   Future Appointments   Date Time Provider Pancho Amber   4/12/2022  1:00 PM MEGHANN Mak 37   4/25/2022  1:40 PM MEGHANN Gill - CNP [facility] [place] [place]

## 2022-04-01 DIAGNOSIS — G89.4 CHRONIC PAIN SYNDROME: ICD-10-CM

## 2022-04-01 DIAGNOSIS — M47.816 SPONDYLOSIS OF LUMBAR REGION WITHOUT MYELOPATHY OR RADICULOPATHY: ICD-10-CM

## 2022-04-01 DIAGNOSIS — M54.17 LUMBOSACRAL RADICULITIS: ICD-10-CM

## 2022-04-01 DIAGNOSIS — F11.90 CHRONIC, CONTINUOUS USE OF OPIOIDS: ICD-10-CM

## 2022-04-01 DIAGNOSIS — M53.3 SACROILIAC JOINT DYSFUNCTION: ICD-10-CM

## 2022-04-01 DIAGNOSIS — M48.061 SPINAL STENOSIS OF LUMBAR REGION WITHOUT NEUROGENIC CLAUDICATION: ICD-10-CM

## 2022-04-04 RX ORDER — TRAMADOL HYDROCHLORIDE 50 MG/1
50 TABLET ORAL EVERY 6 HOURS PRN
Qty: 120 TABLET | Refills: 0 | Status: SHIPPED | OUTPATIENT
Start: 2022-04-04 | End: 2022-07-25 | Stop reason: SDUPTHER

## 2022-04-04 NOTE — TELEPHONE ENCOUNTER
OARRS reviewed. UDS: + for  Tramadol consistent. Last seen: 1/31/2022.  Follow-up:   Future Appointments   Date Time Provider Pancho Amber   4/12/2022  1:00 PM MEGHANN Farias 37   4/25/2022  1:40 PM MEGHANN Ortiz - CNP N [facility] Pain [facility] - [address] [facility]

## 2022-04-14 DIAGNOSIS — G89.4 CHRONIC PAIN SYNDROME: ICD-10-CM

## 2022-04-14 DIAGNOSIS — M54.17 LUMBOSACRAL RADICULITIS: ICD-10-CM

## 2022-04-14 RX ORDER — BUPRENORPHINE 7.5 UG/H
1 PATCH TRANSDERMAL WEEKLY
Qty: 4 PATCH | Refills: 0 | Status: SHIPPED | OUTPATIENT
Start: 2022-04-15 | End: 2022-05-16

## 2022-04-14 NOTE — TELEPHONE ENCOUNTER
OARRS reviewed. UDS: + for  Tramadol consistent. Last seen: 1/31/2022.  Follow-up:   Future Appointments   Date Time Provider Pancho Clark   4/25/2022  1:40 PM Natalie Blocker, APRN - CNP N [facility] Pain [facility] - [place] [facility]

## 2022-04-18 RX ORDER — FLUTICASONE FUROATE AND VILANTEROL TRIFENATATE 100; 25 UG/1; UG/1
POWDER RESPIRATORY (INHALATION)
Qty: 60 EACH | Refills: 3 | OUTPATIENT
Start: 2022-04-18

## 2022-04-25 ENCOUNTER — OFFICE VISIT (OUTPATIENT)
Dept: PHYSICAL MEDICINE AND REHAB | Age: 87
End: 2022-04-25
Payer: MEDICARE

## 2022-04-25 VITALS
BODY MASS INDEX: 26.52 KG/M2 | DIASTOLIC BLOOD PRESSURE: 70 MMHG | HEIGHT: 68 IN | HEART RATE: 68 BPM | SYSTOLIC BLOOD PRESSURE: 122 MMHG | WEIGHT: 175 LBS

## 2022-04-25 DIAGNOSIS — M47.816 SPONDYLOSIS OF LUMBAR REGION WITHOUT MYELOPATHY OR RADICULOPATHY: ICD-10-CM

## 2022-04-25 DIAGNOSIS — M54.17 LUMBOSACRAL RADICULITIS: Primary | ICD-10-CM

## 2022-04-25 DIAGNOSIS — M48.061 SPINAL STENOSIS OF LUMBAR REGION WITHOUT NEUROGENIC CLAUDICATION: ICD-10-CM

## 2022-04-25 DIAGNOSIS — F11.90 CHRONIC, CONTINUOUS USE OF OPIOIDS: ICD-10-CM

## 2022-04-25 DIAGNOSIS — M53.3 SACROILIAC JOINT DYSFUNCTION: ICD-10-CM

## 2022-04-25 DIAGNOSIS — G89.4 CHRONIC PAIN SYNDROME: ICD-10-CM

## 2022-04-25 PROCEDURE — 99214 OFFICE O/P EST MOD 30 MIN: CPT | Performed by: NURSE PRACTITIONER

## 2022-04-25 PROCEDURE — G8417 CALC BMI ABV UP PARAM F/U: HCPCS | Performed by: NURSE PRACTITIONER

## 2022-04-25 PROCEDURE — 1123F ACP DISCUSS/DSCN MKR DOCD: CPT | Performed by: NURSE PRACTITIONER

## 2022-04-25 PROCEDURE — 1036F TOBACCO NON-USER: CPT | Performed by: NURSE PRACTITIONER

## 2022-04-25 PROCEDURE — G8427 DOCREV CUR MEDS BY ELIG CLIN: HCPCS | Performed by: NURSE PRACTITIONER

## 2022-04-25 PROCEDURE — 4040F PNEUMOC VAC/ADMIN/RCVD: CPT | Performed by: NURSE PRACTITIONER

## 2022-04-25 RX ORDER — LIDOCAINE 50 MG/G
OINTMENT TOPICAL
Qty: 50 G | Refills: 5 | Status: SHIPPED | OUTPATIENT
Start: 2022-04-25 | End: 2023-04-25

## 2022-04-25 ASSESSMENT — ENCOUNTER SYMPTOMS
NAUSEA: 0
ABDOMINAL PAIN: 0
SHORTNESS OF BREATH: 0
DIARRHEA: 0
VOMITING: 0
WHEEZING: 0
SORE THROAT: 0
RHINORRHEA: 0
CHEST TIGHTNESS: 0
COUGH: 0
CONSTIPATION: 0
COLOR CHANGE: 0
SINUS PRESSURE: 0
BACK PAIN: 1
PHOTOPHOBIA: 0
EYE PAIN: 0

## 2022-04-25 NOTE — PROGRESS NOTES
[address] [facility] [address]  Dept: [phone]  Dept Fax: [phone]: [phone]    Visit Date: 4/25/2022    Functionality Assessment/Goals Worksheet     On a scale of 0 (Does not Interfere) to 10 (Completely Interferes)     1. Which number describes how during the past week pain has interfered with       the following:  A. General Activity:  2  B. Mood: 1  C. Walking Ability:  3  D. Normal Work (Includes both work outside the home and housework):  4  E. Relations with Other People:   1  F. Sleep:   1  G. Enjoyment of Life:   3    2. Patient Prefers to Take their Pain Medications:     [x]  On a regular basis   []  Only when necessary    []  Does not take pain medications    3. What are the Patient's Goals/Expectations for Visiting Pain Management? []  Learn about my pain    [x]  Receive Medication   []  Physical Therapy     HPI:   Michael Guillory is a 80 y.o. male is here today for    Chief Complaint: Low back pain, Mid back pain, Right leg pain, Left leg pain, Hip pain, Knee pain  and SI pain    HPI   F/U no new health issues. He continues to have low back mid back hip SI  BLE pain. He has had LESI L5 left in past with great releif for 18 months. He has had other injections  without relief. He uses a cane. He has increased pain with walking standing bending lifting twisting turning. He continues to take Tramadol and Butrans patch states his pain is manageable and tolerable. Pain increases with bending, lifting, twisting , reaching, pushing, pulling, walking, standing, stairs and getting up and down. Treatments Tried ice, heat, NSAIDS, narcotics, muscle relaxer, OTC rubs creams patches, steroid burst and injections  Pain Description sharp, stabbing, burning, throbbing, aching and numbness     He denies any ER visits or new health issues since last visit.     Pain scale with out pain medications or at its worst is 5/10. Pain scale with pain medications or at its best is 0/10. Last dose of Tramadol Butrans patch RUC was today  Drug screen reviewed from 1/2022and was appropriate  Pill count completed  today and WNL: Yes       The patienthas No Known Allergies. Subjective:      Review of Systems   Constitutional: Positive for activity change. Negative for appetite change, chills, diaphoresis, fatigue, fever and unexpected weight change. HENT: Negative for congestion, ear pain, hearing loss, mouth sores, nosebleeds, rhinorrhea, sinus pressure and sore throat. Eyes: Negative for photophobia, pain and visual disturbance. Respiratory: Negative for cough, chest tightness, shortness of breath and wheezing. PATRICIA has ASTHMA   Cardiovascular: Negative for chest pain and palpitations. 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 GERD   Endocrine: Negative for cold intolerance, heat intolerance, polydipsia, polyphagia and polyuria. DM   Genitourinary: Negative for decreased urine volume, difficulty urinating, frequency and hematuria. Prostate issues   Musculoskeletal: Positive for arthralgias, back pain, gait problem, joint swelling, myalgias, neck pain and neck stiffness. Skin: Negative for color change and rash. Allergic/Immunologic: Negative for food allergies and immunocompromised state. Neurological: Positive for weakness and numbness. Negative for dizziness, tremors, seizures, syncope, facial asymmetry, speech difficulty, light-headedness and headaches. Hematological: Does not bruise/bleed easily. Psychiatric/Behavioral: Negative for agitation, behavioral problems, confusion, decreased concentration, dysphoric mood, hallucinations, self-injury, sleep disturbance and suicidal ideas. The patient is not nervous/anxious and is not hyperactive.         Objective: Vitals:    04/25/22 1339   BP: 122/70   Site: Left Upper Arm   Position: Sitting   Cuff Size: Medium Adult   Pulse: 68   Weight: 175 lb (79.4 kg)   Height: 5' 8\" (1.727 m)       Physical Exam  Vitals and nursing note reviewed. Constitutional:       General: He is not in acute distress. Appearance: He is well-developed. He is not diaphoretic. HENT:      Head: Normocephalic and atraumatic. Right Ear: External ear normal.      Left Ear: External ear normal.      Nose: Nose normal.      Mouth/Throat:      Pharynx: No oropharyngeal exudate. Eyes:      General: No scleral icterus. Right eye: No discharge. Left eye: No discharge. Conjunctiva/sclera: Conjunctivae normal.      Pupils: Pupils are equal, round, and reactive to light. Neck:      Thyroid: No thyromegaly. Cardiovascular:      Rate and Rhythm: Normal rate and regular rhythm. Heart sounds: Normal heart sounds. No murmur heard. No friction rub. No gallop. Pulmonary:      Effort: Pulmonary effort is normal. No respiratory distress. Breath sounds: Normal breath sounds. No wheezing or rales. Chest:      Chest wall: No tenderness. Abdominal:      General: Bowel sounds are normal. There is no distension. Palpations: Abdomen is soft. Tenderness: There is no abdominal tenderness. There is no guarding or rebound. Musculoskeletal:         General: Tenderness present. Right shoulder: Normal.      Left shoulder: Normal.      Right wrist: Normal.      Right hand: Normal.      Left hand: Normal.      Cervical back: Full passive range of motion without pain, normal range of motion and neck supple. No edema, erythema or rigidity. No muscular tenderness. Normal range of motion. Thoracic back: Tenderness present. Lumbar back: Spasms, tenderness and bony tenderness present. Decreased range of motion. Back:       Right hip: Tenderness and bony tenderness present. Decreased range of motion. Decreased strength. Left hip: Tenderness and bony tenderness present. Decreased range of motion. Decreased strength. Right upper leg: Tenderness present. Left upper leg: Normal.      Right knee: Swelling present. Decreased range of motion. Tenderness present. Left knee: Tenderness present. Right lower leg: Tenderness present. Left lower leg: Normal.      Right ankle: Tenderness present. Left ankle: Normal.      Right foot: Tenderness present. Left foot: Normal.      Comments: Right elbow skin tear form carpet burn dressing dry intact   Skin:     General: Skin is warm. Coloration: Skin is not pale. Findings: No erythema or rash. Neurological:      Mental Status: He is alert and oriented to person, place, and time. He is not disoriented. Cranial Nerves: Cranial nerves are intact. No cranial nerve deficit. Sensory: Sensation is intact. No sensory deficit. Motor: Motor function is intact. No atrophy or abnormal muscle tone. Coordination: Coordination is intact. Coordination normal.      Gait: Gait abnormal.      Deep Tendon Reflexes: Reflexes are normal and symmetric. Babinski sign absent on the right side. Reflex Scores:       Tricep reflexes are 2+ on the right side and 2+ on the left side. Bicep reflexes are 2+ on the right side and 2+ on the left side. Brachioradialis reflexes are 2+ on the right side and 2+ on the left side. Patellar reflexes are 2+ on the right side and 2+ on the left side. Achilles reflexes are 2+ on the right side and 2+ on the left side. Comments: Motor 4/5 RLE   SLR + RLE    Psychiatric:         Attention and Perception: Attention normal. He is attentive. Mood and Affect: Mood and affect normal. Mood is not anxious or depressed. Affect is not labile, blunt, angry or inappropriate.          Speech: Speech normal.         Behavior: Behavior normal. Behavior is not agitated, slowed, aggressive, withdrawn, hyperactive or combative. Thought Content: Thought content normal. Thought content is not paranoid or delusional. Thought content does not include homicidal or suicidal ideation. Thought content does not include homicidal or suicidal plan. Cognition and Memory: Cognition normal. Memory is impaired. He does not exhibit impaired recent memory or impaired remote memory. Judgment: Judgment normal. Judgment is not impulsive or inappropriate. Comments: forgetful       IONA  Patricks test  positive  Yeoman's  or Gaenslen's positive  Kemps  positive  Spurlings  negative  Aguilar's negative         Assessment:     1. Lumbosacral radiculitis    2. Chronic pain syndrome    3. Spondylosis of lumbar region without myelopathy or radiculopathy    4. Spinal stenosis of lumbar region without neurogenic claudication    5. Sacroiliac joint dysfunction    6. Chronic, continuous use of opioids            Plan:      · OARRS reviewed. Current MED: 20  · Patient was not offered naloxone for home.  Procedures:none   Discussed with patient about risks with procedure including infection, reaction to medication, increased pain, or bleeding.  Medications:Tramadol Butrans patch Lidocaine ointment  Neurontin       Meds. Prescribed:   No orders of the defined types were placed in this encounter. Return for Follow up pain medications.                Electronically signed by MEGHANN Jasmine CNP on4/25/2022 at 1:52 PM      []  Treat Depression   []  Receive Injections    []  Treat Sleep   []  Deal with Anxiety and Stress   []  Treat Opoid Dependence/Addiction   []  Other:

## 2022-05-13 DIAGNOSIS — G89.4 CHRONIC PAIN SYNDROME: ICD-10-CM

## 2022-05-13 DIAGNOSIS — M54.17 LUMBOSACRAL RADICULITIS: ICD-10-CM

## 2022-05-16 RX ORDER — BUPRENORPHINE 7.5 UG/H
1 PATCH TRANSDERMAL WEEKLY
Qty: 4 PATCH | Refills: 0 | Status: SHIPPED | OUTPATIENT
Start: 2022-05-16 | End: 2022-06-14

## 2022-05-16 NOTE — TELEPHONE ENCOUNTER
OARRS reviewed. UDS: + for  Tramadol consistent. Last seen: 4/25/2022.  Follow-up:   Future Appointments   Date Time Provider Pancho Amber   7/25/2022  1:20 PM MEGHANN Gordon - CNP N [facility] Pain [facility] - [place]

## 2022-06-13 DIAGNOSIS — M54.17 LUMBOSACRAL RADICULITIS: ICD-10-CM

## 2022-06-13 DIAGNOSIS — G89.4 CHRONIC PAIN SYNDROME: ICD-10-CM

## 2022-06-13 RX ORDER — FLUTICASONE FUROATE AND VILANTEROL TRIFENATATE 100; 25 UG/1; UG/1
POWDER RESPIRATORY (INHALATION)
Qty: 60 EACH | Refills: 3 | OUTPATIENT
Start: 2022-06-13

## 2022-06-14 RX ORDER — BUPRENORPHINE 7.5 UG/H
PATCH TRANSDERMAL
Qty: 4 PATCH | Refills: 0 | Status: SHIPPED | OUTPATIENT
Start: 2022-06-16 | End: 2022-07-25 | Stop reason: SDUPTHER

## 2022-06-14 NOTE — TELEPHONE ENCOUNTER
OARRS reviewed. UDS: + for  Tramadol. Negative gabapentin  Last seen: Visit date not found.  Follow-up:   Future Appointments   Date Time Provider Pancho Clark   7/25/2022  1:20 PM MEGHANN Herrera - [facility] [address] [facility]

## 2022-07-25 ENCOUNTER — OFFICE VISIT (OUTPATIENT)
Dept: PHYSICAL MEDICINE AND REHAB | Age: 87
End: 2022-07-25
Payer: MEDICARE

## 2022-07-25 VITALS
HEIGHT: 68 IN | DIASTOLIC BLOOD PRESSURE: 60 MMHG | SYSTOLIC BLOOD PRESSURE: 110 MMHG | HEART RATE: 72 BPM | BODY MASS INDEX: 26.98 KG/M2 | WEIGHT: 178 LBS

## 2022-07-25 DIAGNOSIS — M47.816 SPONDYLOSIS OF LUMBAR REGION WITHOUT MYELOPATHY OR RADICULOPATHY: ICD-10-CM

## 2022-07-25 DIAGNOSIS — M48.061 SPINAL STENOSIS OF LUMBAR REGION WITHOUT NEUROGENIC CLAUDICATION: ICD-10-CM

## 2022-07-25 DIAGNOSIS — M53.3 SACROILIAC JOINT DYSFUNCTION: ICD-10-CM

## 2022-07-25 DIAGNOSIS — F11.90 CHRONIC, CONTINUOUS USE OF OPIOIDS: ICD-10-CM

## 2022-07-25 DIAGNOSIS — M54.17 LUMBOSACRAL RADICULITIS: ICD-10-CM

## 2022-07-25 DIAGNOSIS — G89.4 CHRONIC PAIN SYNDROME: ICD-10-CM

## 2022-07-25 PROCEDURE — 1123F ACP DISCUSS/DSCN MKR DOCD: CPT | Performed by: NURSE PRACTITIONER

## 2022-07-25 PROCEDURE — 99214 OFFICE O/P EST MOD 30 MIN: CPT | Performed by: NURSE PRACTITIONER

## 2022-07-25 RX ORDER — TRAMADOL HYDROCHLORIDE 50 MG/1
50 TABLET ORAL EVERY 6 HOURS PRN
Qty: 120 TABLET | Refills: 0 | Status: SHIPPED | OUTPATIENT
Start: 2022-07-25 | End: 2022-08-23

## 2022-07-25 RX ORDER — BUPRENORPHINE 7.5 UG/H
1 PATCH TRANSDERMAL WEEKLY
Qty: 4 PATCH | Refills: 0 | Status: SHIPPED | OUTPATIENT
Start: 2022-07-25 | End: 2022-09-20 | Stop reason: SDUPTHER

## 2022-07-25 ASSESSMENT — ENCOUNTER SYMPTOMS
PHOTOPHOBIA: 0
SHORTNESS OF BREATH: 0
CONSTIPATION: 0
DIARRHEA: 0
BACK PAIN: 1
WHEEZING: 0
NAUSEA: 0
SINUS PRESSURE: 0
CHEST TIGHTNESS: 0
EYE PAIN: 0
ABDOMINAL PAIN: 0
SORE THROAT: 0
VOMITING: 0
RHINORRHEA: 0
COLOR CHANGE: 0
COUGH: 0

## 2022-07-25 NOTE — PROGRESS NOTES
[facility] [address]  Dept: [phone]  Dept Fax: [phone]: [phone]    Visit Date: 7/25/2022    Functionality Assessment/Goals Worksheet     On a scale of 0 (Does not Interfere) to 10 (Completely Interferes)     1. Which number describes how during the past week pain has interfered with       the following:  A. General Activity:  4  B. Mood: 2  C. Walking Ability:  4  D. Normal Work (Includes both work outside the home and housework):  5  E. Relations with Other People:   1  F. Sleep:   2  G. Enjoyment of Life:   3    2. Patient Prefers to Take their Pain Medications:     [x]  On a regular basis   []  Only when necessary    []  Does not take pain medications    3. What are the Patient's Goals/Expectations for Visiting Pain Management? []  Learn about my pain    [x]  Receive Medication   []  Physical Therapy     []  Treat Depression   [x]  Receive Injections    []  Treat Sleep   []  Deal with Anxiety and Stress   []  Treat Opoid Dependence/Addiction   []  Other:      HPI:   Yoandy Cervantes is a 80 y.o. male is here today for    Chief Complaint: Low back pain, Hip pain, Knee pain , and SI pain    HPI   F/U here with cane having increased back pain taking Tramadol more frequently     He continues to have low back mid back hip SI  BLE pain. He has had LESI L5 left in past with great releif for 18 months. He has had other injections  without relief. He has increased pain with walking standing bending lifting twisting turning. Pain increases with bending, lifting, twisting , reaching, pushing, pulling, walking, standing, stairs and getting up and down.   Treatments Tried ice, heat, NSAIDS, narcotics, muscle relaxer, OTC rubs creams patches, steroid burst and injections  Pain Description sharp, stabbing, burning, throbbing, aching and numbness         He denies any ER visits or new health issues since last visit. Pain scale with out pain medications or at its worst is 6/10. Pain scale with pain medications or at its best is 1/10. Last dose of Butrans patch LUE Tramadol  was today  Drug screen reviewed from 4/2022 and was appropriate  Pill count completed  today and WNL: Yes     The patienthas No Known Allergies. Subjective:      Review of Systems   Constitutional:  Positive for activity change. Negative for appetite change, chills, diaphoresis, fatigue, fever and unexpected weight change. HENT:  Negative for congestion, ear pain, hearing loss, mouth sores, nosebleeds, rhinorrhea, sinus pressure and sore throat. Eyes:  Negative for photophobia, pain and visual disturbance. Respiratory:  Negative for cough, chest tightness, shortness of breath and wheezing. PATRICIA has ASTHMA   Cardiovascular:  Negative for chest pain and palpitations. MVR 1997 CAD HTN   Gastrointestinal:  Negative for abdominal pain, constipation, diarrhea, nausea and vomiting. GERD   Endocrine: Negative for cold intolerance, heat intolerance, polydipsia, polyphagia and polyuria. DM   Genitourinary:  Negative for decreased urine volume, difficulty urinating, frequency and hematuria. Prostate issues   Musculoskeletal:  Positive for arthralgias, back pain, gait problem, joint swelling, myalgias, neck pain and neck stiffness. Skin:  Negative for color change and rash. Allergic/Immunologic: Negative for food allergies and immunocompromised state. Neurological:  Positive for weakness and numbness. Negative for dizziness, tremors, seizures, syncope, facial asymmetry, speech difficulty, light-headedness and headaches. Hematological:  Does not bruise/bleed easily. Psychiatric/Behavioral:  Negative for agitation, behavioral problems, confusion, decreased concentration, dysphoric mood, hallucinations, self-injury, sleep disturbance and suicidal ideas. The patient is not nervous/anxious and is not hyperactive. Objective:     Vitals:    07/25/22 1315   BP: 110/60   Site: Left Upper Arm   Position: Sitting   Cuff Size: Medium Adult   Pulse: 72   Weight: 178 lb (80.7 kg)   Height: 5' 8\" (1.727 m)       Physical Exam  Vitals and nursing note reviewed. Constitutional:       General: He is not in acute distress. Appearance: He is well-developed. He is not diaphoretic. HENT:      Head: Normocephalic and atraumatic. Right Ear: External ear normal.      Left Ear: External ear normal.      Nose: Nose normal.      Mouth/Throat:      Pharynx: No oropharyngeal exudate. Eyes:      General: No scleral icterus. Right eye: No discharge. Left eye: No discharge. Conjunctiva/sclera: Conjunctivae normal.      Pupils: Pupils are equal, round, and reactive to light. Neck:      Thyroid: No thyromegaly. Cardiovascular:      Rate and Rhythm: Normal rate and regular rhythm. Heart sounds: Normal heart sounds. No murmur heard. No friction rub. No gallop. Pulmonary:      Effort: Pulmonary effort is normal. No respiratory distress. Breath sounds: Normal breath sounds. No wheezing or rales. Chest:      Chest wall: No tenderness. Abdominal:      General: Bowel sounds are normal. There is no distension. Palpations: Abdomen is soft. Tenderness: There is no abdominal tenderness. There is no guarding or rebound. Musculoskeletal:         General: Tenderness present. Right shoulder: Normal.      Left shoulder: Normal.      Right wrist: Normal.      Right hand: Normal.      Left hand: Normal.      Cervical back: Full passive range of motion without pain, normal range of motion and neck supple. No edema, erythema or rigidity. No muscular tenderness. Normal range of motion. Thoracic back: Tenderness present. Lumbar back: Spasms, tenderness and bony tenderness present. Decreased range of motion.         Back: Right hip: Tenderness and bony tenderness present. Decreased range of motion. Decreased strength. Left hip: Tenderness and bony tenderness present. Decreased range of motion. Decreased strength. Right upper leg: Tenderness present. Left upper leg: Normal.      Right knee: Swelling present. Decreased range of motion. Tenderness present. Left knee: Tenderness present. Right lower leg: Tenderness present. Left lower leg: Normal.      Right ankle: Tenderness present. Left ankle: Normal.      Right foot: Tenderness present. Left foot: Normal.      Comments: Right elbow skin tear form carpet burn dressing dry intact   Skin:     General: Skin is warm. Coloration: Skin is not pale. Findings: No erythema or rash. Neurological:      Mental Status: He is alert and oriented to person, place, and time. He is not disoriented. Cranial Nerves: Cranial nerves are intact. No cranial nerve deficit. Sensory: Sensation is intact. No sensory deficit. Motor: Motor function is intact. No atrophy or abnormal muscle tone. Coordination: Coordination is intact. Coordination normal.      Gait: Gait abnormal.      Deep Tendon Reflexes: Reflexes are normal and symmetric. Babinski sign absent on the right side. Reflex Scores:       Tricep reflexes are 2+ on the right side and 2+ on the left side. Bicep reflexes are 2+ on the right side and 2+ on the left side. Brachioradialis reflexes are 2+ on the right side and 2+ on the left side. Patellar reflexes are 2+ on the right side and 2+ on the left side. Achilles reflexes are 2+ on the right side and 2+ on the left side. Comments: Motor 4/5 RLE   SLR + RLE    Psychiatric:         Attention and Perception: Attention normal. He is attentive. Mood and Affect: Mood and affect normal. Mood is not anxious or depressed. Affect is not labile, blunt, angry or inappropriate.          Speech: Speech normal.         Behavior: Behavior normal. Behavior is not agitated, slowed, aggressive, withdrawn, hyperactive or combative. Thought Content: Thought content normal. Thought content is not paranoid or delusional. Thought content does not include homicidal or suicidal ideation. Thought content does not include homicidal or suicidal plan. Cognition and Memory: Cognition normal. Memory is impaired. He does not exhibit impaired recent memory or impaired remote memory. Judgment: Judgment normal. Judgment is not impulsive or inappropriate. Comments: forgetful     IONA  Patricks test  positive  Yeoman's  or Gaenslen's positive  Kemps  positive  Spurlings  positive  Aguilar's na         Assessment:     1. Spondylosis of lumbar region without myelopathy or radiculopathy    2. Lumbosacral radiculitis    3. Spinal stenosis of lumbar region without neurogenic claudication    4. Sacroiliac joint dysfunction    5. Chronic, continuous use of opioids    6. Chronic pain syndrome            Plan:      OARRS reviewed. Current MED: 33.5  Patient was not offered naloxone for home. Testing Labs or Radiology reviewed: lumbar  Procedures:discussed   Discussed with patient about risks with procedure including infection, reaction to medication, increased pain, or bleeding. Medications:Tramadol Butrans patch         Meds. Prescribed:   Orders Placed This Encounter   Medications    traMADol (ULTRAM) 50 MG tablet     Sig: Take 1 tablet by mouth every 6 hours as needed for Pain for up to 30 days. Dispense:  120 tablet     Refill:  0     Reduce doses taken as pain becomes manageable    buprenorphine (BUPRENEX) 7.5 MCG/HR PTWK     Sig: Place 1 patch onto the skin once a week for 28 days. Dispense:  4 patch     Refill:  0         Return in about 3 months (around 10/25/2022) for Follow up pain medications.                Electronically signed by MEGHANN Steve CNP on7/25/2022 at 1:31 PM

## 2022-08-18 DIAGNOSIS — G89.4 CHRONIC PAIN SYNDROME: ICD-10-CM

## 2022-08-18 DIAGNOSIS — M54.17 LUMBOSACRAL RADICULITIS: ICD-10-CM

## 2022-08-18 DIAGNOSIS — F11.90 CHRONIC, CONTINUOUS USE OF OPIOIDS: ICD-10-CM

## 2022-08-18 DIAGNOSIS — M53.3 SACROILIAC JOINT DYSFUNCTION: ICD-10-CM

## 2022-08-18 DIAGNOSIS — M47.816 SPONDYLOSIS OF LUMBAR REGION WITHOUT MYELOPATHY OR RADICULOPATHY: ICD-10-CM

## 2022-08-18 DIAGNOSIS — M48.061 SPINAL STENOSIS OF LUMBAR REGION WITHOUT NEUROGENIC CLAUDICATION: ICD-10-CM

## 2022-08-22 NOTE — TELEPHONE ENCOUNTER
OARRS reviewed. UDS: + for Tramadol, Buprenorphine. Last seen: 7/25/2022.  Follow-up:   Future Appointments   Date Time Provider Pancho Clark   10/24/2022  1:20 PM MEGHANN Macias - CNP N [facility] - [facility]

## 2022-08-23 RX ORDER — TRAMADOL HYDROCHLORIDE 50 MG/1
TABLET ORAL
Qty: 120 TABLET | Refills: 0 | Status: SHIPPED | OUTPATIENT
Start: 2022-08-24 | End: 2022-09-23

## 2022-09-20 DIAGNOSIS — G89.4 CHRONIC PAIN SYNDROME: ICD-10-CM

## 2022-09-20 DIAGNOSIS — M54.17 LUMBOSACRAL RADICULITIS: ICD-10-CM

## 2022-09-20 RX ORDER — BUPRENORPHINE 7.5 UG/H
1 PATCH TRANSDERMAL WEEKLY
Qty: 4 PATCH | Refills: 0 | Status: SHIPPED | OUTPATIENT
Start: 2022-09-20 | End: 2022-10-20

## 2022-09-20 NOTE — TELEPHONE ENCOUNTER
Dayan Ferrera called requesting a refill on the following medications:  Requested Prescriptions     Pending Prescriptions Disp Refills    buprenorphine (BUPRENEX) 7.5 MCG/HR PTWK 4 patch 0     Sig: Place 1 patch onto the skin once a week for 28 days.      Pharmacy verified:  [facility]      Date of last visit: 07-25-22  Date of next visit (if applicable): 76/23/8011

## 2022-09-20 NOTE — TELEPHONE ENCOUNTER
OARRS reviewed. UDS: + for  buprenorphine tramadol consistent. Last seen: 7/25/2022.  Follow-up:   Future Appointments   Date Time Provider Pancho Clark   10/24/2022  1:20 PM MEGHANN Pavon - CNP N [facility] - [facility]

## 2022-10-19 DIAGNOSIS — M54.17 LUMBOSACRAL RADICULITIS: ICD-10-CM

## 2022-10-19 DIAGNOSIS — G89.4 CHRONIC PAIN SYNDROME: ICD-10-CM

## 2022-10-19 NOTE — TELEPHONE ENCOUNTER
Shoaib Wild called requesting a refill on the following medications:  Requested Prescriptions     Pending Prescriptions Disp Refills    buprenorphine ([address]) 7.5 MCG/HR [address] [facility] [Pharmacy Med Name: BUPRENORPHINE 7.5MCG/HR TD PATCH] 4 patch      Sig: APPLY 1 PATCH TOPICALLY TO THE SKIN [address] [facility],  [address] verified:  .[email or web address]  [id number] - [place], [address] [facility] [phone] [facility] [phone]    Date of last visit: 07/25/2022  Date of next visit (if applicable): 24/47/4080

## 2022-10-20 RX ORDER — BUPRENORPHINE 7.5 UG/H
PATCH TRANSDERMAL
Qty: 4 PATCH | Refills: 0 | Status: SHIPPED | OUTPATIENT
Start: 2022-10-20 | End: 2022-11-17

## 2022-10-20 NOTE — TELEPHONE ENCOUNTER
OARRS reviewed. UDS: + for  . Tramodol, buprenorphine, gabapentin  Last seen: 7/25/2022.  Follow-up:   Future Appointments   Date Time Provider Pancho Clark   10/24/2022  1:20 PM MEGHANN Wallace - CNP N [facility] - [facility]

## 2022-10-24 ENCOUNTER — OFFICE VISIT (OUTPATIENT)
Dept: PHYSICAL MEDICINE AND REHAB | Age: 87
End: 2022-10-24
Payer: MEDICARE

## 2022-10-24 VITALS
WEIGHT: 175 LBS | SYSTOLIC BLOOD PRESSURE: 130 MMHG | BODY MASS INDEX: 26.52 KG/M2 | HEIGHT: 68 IN | HEART RATE: 80 BPM | DIASTOLIC BLOOD PRESSURE: 70 MMHG

## 2022-10-24 DIAGNOSIS — M48.061 SPINAL STENOSIS OF LUMBAR REGION WITHOUT NEUROGENIC CLAUDICATION: ICD-10-CM

## 2022-10-24 DIAGNOSIS — G89.4 CHRONIC PAIN SYNDROME: ICD-10-CM

## 2022-10-24 DIAGNOSIS — M47.816 SPONDYLOSIS OF LUMBAR REGION WITHOUT MYELOPATHY OR RADICULOPATHY: ICD-10-CM

## 2022-10-24 DIAGNOSIS — M54.17 LUMBOSACRAL RADICULITIS: Primary | ICD-10-CM

## 2022-10-24 DIAGNOSIS — M53.3 SACROILIAC JOINT DYSFUNCTION: ICD-10-CM

## 2022-10-24 PROCEDURE — 1123F ACP DISCUSS/DSCN MKR DOCD: CPT | Performed by: NURSE PRACTITIONER

## 2022-10-24 PROCEDURE — 99214 OFFICE O/P EST MOD 30 MIN: CPT | Performed by: NURSE PRACTITIONER

## 2022-10-24 RX ORDER — GABAPENTIN 300 MG/1
300 CAPSULE ORAL NIGHTLY
Qty: 90 CAPSULE | Refills: 1 | Status: SHIPPED | OUTPATIENT
Start: 2022-10-24 | End: 2023-01-22

## 2022-10-24 ASSESSMENT — ENCOUNTER SYMPTOMS
BACK PAIN: 1
CONSTIPATION: 0
SORE THROAT: 0
WHEEZING: 0
PHOTOPHOBIA: 0
COUGH: 0
COLOR CHANGE: 0
SINUS PRESSURE: 0
ABDOMINAL PAIN: 0
CHEST TIGHTNESS: 0
VOMITING: 0
DIARRHEA: 0
EYE PAIN: 0
RHINORRHEA: 0
NAUSEA: 0
SHORTNESS OF BREATH: 0

## 2022-10-24 NOTE — PROGRESS NOTES
[facility] [address]  Dept: [phone]  Dept Fax: [phone]: [phone]    Visit Date: 10/24/2022    Functionality Assessment/Goals Worksheet     On a scale of 0 (Does not Interfere) to 10 (Completely Interferes)     1. Which number describes how during the past week pain has interfered with       the following:  A. General Activity:  3  B. Mood: 1  C. Walking Ability:  3  D. Normal Work (Includes both work outside the home and housework):  3  E. Relations with Other People:   1  F. Sleep:   2  G. Enjoyment of Life:   4    2. Patient Prefers to Take their Pain Medications:     [x]  On a regular basis   []  Only when necessary    []  Does not take pain medications    3. What are the Patient's Goals/Expectations for Visiting Pain Management? []  Learn about my pain    [x]  Receive Medication   []  Physical Therapy     []  Treat Depression   []  Receive Injections    []  Treat Sleep   []  Deal with Anxiety and Stress   []  Treat Opoid Dependence/Addiction   []  Other:      HPI:   George Awad is a 80 y.o. male is here today for    Chief Complaint: Lumbar back pain, Hip pain, Knee pain , and SI pain      F/U using wife's rollator walker today. Usually uses the cane. He continues to have low back mid back hip SI  BLE pain. He has had LESI L5 left in past with great releif for 18 months. He has had other injections  without relief. He has increased pain with walking standing bending lifting twisting turning. Pain increases with bending, lifting, twisting , reaching, pushing, pulling, walking, standing, stairs and getting up and down.   Treatments Tried ice, heat, NSAIDS, narcotics, muscle relaxer, OTC rubs creams patches, steroid burst and injections  Pain Description sharp, stabbing, burning, throbbing, aching and numbness     He denies any ER visits or new health issues since last visit. Pain scale with out pain medications or at its worst is 4/10. Pain scale with pain medications or at its best is 1/10. Last dose of Tramadol  was today  Drug screen reviewed from 7/2022 and was appropriate  Pill count completed  today and WNL: Yes         The patienthas No Known Allergies. Subjective:      Review of Systems   Constitutional:  Positive for activity change. Negative for appetite change, chills, diaphoresis, fatigue, fever and unexpected weight change. HENT:  Negative for congestion, ear pain, hearing loss, mouth sores, nosebleeds, rhinorrhea, sinus pressure and sore throat. Eyes:  Negative for photophobia, pain and visual disturbance. Respiratory:  Negative for cough, chest tightness, shortness of breath and wheezing. PATRICIA has ASTHMA   Cardiovascular:  Negative for chest pain and palpitations. MVR 1997 CAD HTN   Gastrointestinal:  Negative for abdominal pain, constipation, diarrhea, nausea and vomiting. GERD   Endocrine: Negative for cold intolerance, heat intolerance, polydipsia, polyphagia and polyuria. DM   Genitourinary:  Negative for decreased urine volume, difficulty urinating, frequency and hematuria. Prostate issues   Musculoskeletal:  Positive for arthralgias, back pain, gait problem, joint swelling, myalgias, neck pain and neck stiffness. Skin:  Negative for color change and rash. Allergic/Immunologic: Negative for food allergies and immunocompromised state. Neurological:  Positive for weakness and numbness. Negative for dizziness, tremors, seizures, syncope, facial asymmetry, speech difficulty, light-headedness and headaches. Hematological:  Does not bruise/bleed easily. Psychiatric/Behavioral:  Negative for agitation, behavioral problems, confusion, decreased concentration, dysphoric mood, hallucinations, self-injury, sleep disturbance and suicidal ideas.  The patient is not nervous/anxious and is not hyperactive. Objective:     Vitals:    10/24/22 1314   BP: 130/70   Site: Left Upper Arm   Position: Sitting   Cuff Size: Medium Adult   Pulse: 80   Weight: 175 lb (79.4 kg)   Height: 5' 8\" (1.727 m)       Physical Exam  Vitals and nursing note reviewed. Constitutional:       General: He is not in acute distress. Appearance: He is well-developed. He is not diaphoretic. HENT:      Head: Normocephalic and atraumatic. Right Ear: External ear normal.      Left Ear: External ear normal.      Nose: Nose normal.      Mouth/Throat:      Pharynx: No oropharyngeal exudate. Eyes:      General: No scleral icterus. Right eye: No discharge. Left eye: No discharge. Conjunctiva/sclera: Conjunctivae normal.      Pupils: Pupils are equal, round, and reactive to light. Neck:      Thyroid: No thyromegaly. Cardiovascular:      Rate and Rhythm: Normal rate and regular rhythm. Heart sounds: Normal heart sounds. No murmur heard. No friction rub. No gallop. Pulmonary:      Effort: Pulmonary effort is normal. No respiratory distress. Breath sounds: Normal breath sounds. No wheezing or rales. Chest:      Chest wall: No tenderness. Abdominal:      General: Bowel sounds are normal. There is no distension. Palpations: Abdomen is soft. Tenderness: There is no abdominal tenderness. There is no guarding or rebound. Musculoskeletal:         General: Tenderness present. Right shoulder: Normal.      Left shoulder: Normal.      Right wrist: Normal.      Right hand: Normal.      Left hand: Normal.      Cervical back: Full passive range of motion without pain, normal range of motion and neck supple. No edema, erythema or rigidity. No muscular tenderness. Normal range of motion. Thoracic back: Tenderness present. Lumbar back: Spasms, tenderness and bony tenderness present. Decreased range of motion.         Back:       Right hip: Tenderness and bony tenderness present. Decreased range of motion. Decreased strength. Left hip: Tenderness and bony tenderness present. Decreased range of motion. Decreased strength. Right upper leg: Tenderness present. Left upper leg: Normal.      Right knee: Swelling present. Decreased range of motion. Tenderness present. Left knee: Tenderness present. Right lower leg: Tenderness present. Left lower leg: Normal.      Right ankle: Tenderness present. Left ankle: Normal.      Right foot: Tenderness present. Left foot: Normal.      Comments: Right elbow skin tear form carpet burn dressing dry intact   Skin:     General: Skin is warm. Coloration: Skin is not pale. Findings: No erythema or rash. Neurological:      Mental Status: He is alert and oriented to person, place, and time. He is not disoriented. Cranial Nerves: No cranial nerve deficit. Sensory: Sensation is intact. No sensory deficit. Motor: Motor function is intact. No atrophy or abnormal muscle tone. Coordination: Coordination is intact. Coordination normal.      Gait: Gait abnormal.      Deep Tendon Reflexes: Reflexes are normal and symmetric. Babinski sign absent on the right side. Reflex Scores:       Tricep reflexes are 2+ on the right side and 2+ on the left side. Bicep reflexes are 2+ on the right side and 2+ on the left side. Brachioradialis reflexes are 2+ on the right side and 2+ on the left side. Patellar reflexes are 2+ on the right side and 2+ on the left side. Achilles reflexes are 2+ on the right side and 2+ on the left side. Comments: Motor 4/5 RLE   SLR + RLE    Psychiatric:         Attention and Perception: Attention normal. He is attentive. Mood and Affect: Mood and affect normal. Mood is not anxious or depressed. Affect is not labile, blunt, angry or inappropriate.          Speech: Speech normal.         Behavior: Behavior normal. Behavior is not agitated, slowed, aggressive, withdrawn, hyperactive or combative. Thought Content: Thought content normal. Thought content is not paranoid or delusional. Thought content does not include homicidal or suicidal ideation. Thought content does not include homicidal or suicidal plan. Cognition and Memory: Cognition normal. Memory is impaired. He does not exhibit impaired recent memory or impaired remote memory. Judgment: Judgment normal. Judgment is not impulsive or inappropriate. Comments: forgetful     IONA  Patricks test  positive  Yeoman's  or Gaenslen's positive  Kemps  positive       Assessment:     1. Lumbosacral radiculitis    2. Spondylosis of lumbar region without myelopathy or radiculopathy    3. Spinal stenosis of lumbar region without neurogenic claudication    4. Sacroiliac joint dysfunction    5. Chronic pain syndrome            Plan:      OARRS reviewed. Current MED: 20  Patient was not offered naloxone for home. Medications: Butrans patch RUC Tramadol  BIOMED  Neurontin       Meds. Prescribed:   Orders Placed This Encounter   Medications    gabapentin (NEURONTIN) 300 MG capsule     Sig: Take 1 capsule by mouth nightly for 90 days. Dispense:  90 capsule     Refill:  1         Return in about 3 months (around 1/24/2023).                Electronically signed by MEGHANN Alvarez CNP on10/24/2022 at 1:30 PM

## 2022-11-02 ENCOUNTER — TELEPHONE (OUTPATIENT)
Dept: PHYSICAL MEDICINE AND REHAB | Age: 87
End: 2022-11-02

## 2022-11-02 NOTE — TELEPHONE ENCOUNTER
Patient was last seen 10/24/2022 by Shiv Cervantes. He is calling in regarding the gabapentin prescription that was filled at that visit. He did not  the prescription, he said he stopped that medication before he started seeing Homar Hernandez? Please call him to confirm.

## 2022-11-02 NOTE — TELEPHONE ENCOUNTER
Called pt. To clarify and he is not taking the Gabapentin and is not going to as does not want more meds and is doing ok without.

## 2022-11-27 DIAGNOSIS — G89.4 CHRONIC PAIN SYNDROME: ICD-10-CM

## 2022-11-27 DIAGNOSIS — M54.17 LUMBOSACRAL RADICULITIS: ICD-10-CM

## 2022-11-28 RX ORDER — BUPRENORPHINE 7.5 UG/H
PATCH TRANSDERMAL
Qty: 4 PATCH | Refills: 0 | Status: SHIPPED | OUTPATIENT
Start: 2022-11-28 | End: 2022-12-26

## 2022-11-28 NOTE — TELEPHONE ENCOUNTER
OARRS reviewed. UDS: + for  Tramadol. Non-Compliant for Gabapentin and Buprenorphine. Last seen: 10/24/2022.  Follow-up:   Future Appointments   Date Time Provider Pancho Amber   1/30/2023  1:40 PM MEGHANN Pope - CNP N [facility] - [facility]

## 2022-12-28 DIAGNOSIS — M54.17 LUMBOSACRAL RADICULITIS: ICD-10-CM

## 2022-12-28 DIAGNOSIS — G89.4 CHRONIC PAIN SYNDROME: ICD-10-CM

## 2022-12-29 RX ORDER — BUPRENORPHINE 7.5 UG/H
1 PATCH TRANSDERMAL WEEKLY
Qty: 4 PATCH | Refills: 0 | Status: SHIPPED | OUTPATIENT
Start: 2022-12-29 | End: 2023-01-26

## 2022-12-29 NOTE — TELEPHONE ENCOUNTER
OARRS reviewed. UDS: + for  tramadl consistent. Last seen: 10/24/2022.  Follow-up:   Future Appointments   Date Time Provider Pancho Clark   1/30/2023  1:40 PM MEGHANN Strauss - CNP N [facility] - [facility]

## 2022-12-30 ENCOUNTER — HOSPITAL ENCOUNTER (OUTPATIENT)
Age: 87
Discharge: HOME | End: 2022-12-30
Payer: MEDICARE

## 2022-12-30 LAB
ANION GAP SERPL CALCULATED.3IONS-SCNC: 10 MEQ/L (ref 8–16)
BILIRUBIN URINE: NEGATIVE
BLOOD, URINE: NEGATIVE
BUN BLDV-MCNC: 26 MG/DL (ref 7–22)
CALCIUM SERPL-MCNC: 9.7 MG/DL (ref 8.5–10.5)
CHARACTER, URINE: CLEAR
CHLORIDE BLD-SCNC: 102 MEQ/L (ref 98–111)
CO2: 28 MEQ/L (ref 23–33)
COLOR: YELLOW
CREAT SERPL-MCNC: 1.5 MG/DL (ref 0.4–1.2)
ERYTHROCYTE [DISTWIDTH] IN BLOOD BY AUTOMATED COUNT: 14.4 % (ref 11.5–14.5)
ERYTHROCYTE [DISTWIDTH] IN BLOOD BY AUTOMATED COUNT: 51.2 FL (ref 35–45)
GFR SERPL CREATININE-BSD FRML MDRD: 44 ML/MIN/1.73M2
GLUCOSE BLD-MCNC: 121 MG/DL (ref 70–108)
GLUCOSE URINE: NEGATIVE MG/DL
HCT VFR BLD CALC: 34.7 % (ref 42–52)
HEMOGLOBIN: 11.3 GM/DL (ref 14–18)
KETONES, URINE: NEGATIVE
LEUKOCYTE ESTERASE, URINE: NEGATIVE
MCH RBC QN AUTO: 31.5 PG (ref 26–33)
MCHC RBC AUTO-ENTMCNC: 32.6 GM/DL (ref 32.2–35.5)
MCV RBC AUTO: 96.7 FL (ref 80–94)
NITRITE, URINE: NEGATIVE
PH UA: 6.5 (ref 5–9)
PLATELET # BLD: 175 THOU/MM3 (ref 130–400)
PMV BLD AUTO: 10.3 FL (ref 9.4–12.4)
POTASSIUM SERPL-SCNC: 4.9 MEQ/L (ref 3.5–5.2)
PROTEIN UA: NEGATIVE
RBC # BLD: 3.59 MILL/MM3 (ref 4.7–6.1)
SODIUM BLD-SCNC: 140 MEQ/L (ref 135–145)
SPECIFIC GRAVITY, URINE: 1.01 (ref 1–1.03)
UROBILINOGEN, URINE: 0.2 EU/DL (ref 0–1)
WBC # BLD: 6.4 THOU/MM3 (ref 4.8–10.8)

## 2022-12-30 PROCEDURE — 81003 URINALYSIS AUTO W/O SCOPE: CPT

## 2022-12-30 PROCEDURE — 36415 COLL VENOUS BLD VENIPUNCTURE: CPT

## 2022-12-30 PROCEDURE — 80048 BASIC METABOLIC PNL TOTAL CA: CPT

## 2022-12-30 PROCEDURE — 85027 COMPLETE CBC AUTOMATED: CPT

## 2023-01-30 ENCOUNTER — OFFICE VISIT (OUTPATIENT)
Dept: PHYSICAL MEDICINE AND REHAB | Age: 88
End: 2023-01-30
Payer: MEDICARE

## 2023-01-30 VITALS
DIASTOLIC BLOOD PRESSURE: 60 MMHG | HEART RATE: 64 BPM | HEIGHT: 68 IN | WEIGHT: 174 LBS | BODY MASS INDEX: 26.37 KG/M2 | SYSTOLIC BLOOD PRESSURE: 110 MMHG

## 2023-01-30 DIAGNOSIS — M48.061 SPINAL STENOSIS OF LUMBAR REGION WITHOUT NEUROGENIC CLAUDICATION: ICD-10-CM

## 2023-01-30 DIAGNOSIS — F11.90 CHRONIC, CONTINUOUS USE OF OPIOIDS: ICD-10-CM

## 2023-01-30 DIAGNOSIS — G89.4 CHRONIC PAIN SYNDROME: ICD-10-CM

## 2023-01-30 DIAGNOSIS — M54.17 LUMBOSACRAL RADICULITIS: Primary | ICD-10-CM

## 2023-01-30 DIAGNOSIS — M53.3 SACROILIAC JOINT DYSFUNCTION: ICD-10-CM

## 2023-01-30 DIAGNOSIS — M47.816 SPONDYLOSIS OF LUMBAR REGION WITHOUT MYELOPATHY OR RADICULOPATHY: ICD-10-CM

## 2023-01-30 PROCEDURE — G8427 DOCREV CUR MEDS BY ELIG CLIN: HCPCS | Performed by: NURSE PRACTITIONER

## 2023-01-30 PROCEDURE — 99214 OFFICE O/P EST MOD 30 MIN: CPT | Performed by: NURSE PRACTITIONER

## 2023-01-30 PROCEDURE — G8417 CALC BMI ABV UP PARAM F/U: HCPCS | Performed by: NURSE PRACTITIONER

## 2023-01-30 PROCEDURE — 1123F ACP DISCUSS/DSCN MKR DOCD: CPT | Performed by: NURSE PRACTITIONER

## 2023-01-30 PROCEDURE — 1036F TOBACCO NON-USER: CPT | Performed by: NURSE PRACTITIONER

## 2023-01-30 PROCEDURE — G8484 FLU IMMUNIZE NO ADMIN: HCPCS | Performed by: NURSE PRACTITIONER

## 2023-01-30 RX ORDER — TRAMADOL HYDROCHLORIDE 50 MG/1
50 TABLET ORAL 2 TIMES DAILY PRN
Qty: 60 TABLET | Refills: 0 | Status: SHIPPED | OUTPATIENT
Start: 2023-01-30 | End: 2023-03-01

## 2023-01-30 RX ORDER — BUPRENORPHINE 7.5 UG/H
1 PATCH TRANSDERMAL WEEKLY
Qty: 4 PATCH | Refills: 0 | Status: SHIPPED | OUTPATIENT
Start: 2023-01-30 | End: 2023-02-27

## 2023-01-30 NOTE — PROGRESS NOTES
HPI:   Connor Bender is a 80 y.o. male is here today for    Chief Complaint: Lumbar back pain, Hip pain, Knee pain , and SI pain      F/U  here with cane today but using walker more lately. Wife has been in hospital for 2 weeks post fall. He continues to have low back mid back hip SI  BLE pain. He has increased pain with walking standing bending lifting twisting turning. Pain increases with bending, lifting, twisting , reaching, pushing, pulling, walking, standing, stairs and getting up and down. Treatments Tried ice, heat, NSAIDS, narcotics, muscle relaxer, OTC rubs creams patches, steroid burst and injections  Pain Description sharp, stabbing, burning, throbbing, aching and numbness     He denies any ER visits or new health issues since last visit. Pain scale with out pain medications or at its worst is 3/10. Pain scale with pain medications or at its best is denies/10. Last dose of Tramadol  was today  Drug screen reviewed from 10/2022 and was appropriate  Pill count completed  today and WNL: No         Prior Injections:    He has had LESI L5 left in past with great releif for 18 months. He has had other injections  without relief      Radiology:            The patienthas No Known Allergies. Subjective:      Review of Systems    Objective:     Vitals:    01/30/23 1325   BP: 110/60   Site: Left Upper Arm   Position: Sitting   Cuff Size: Medium Adult   Pulse: 64   Weight: 174 lb (78.9 kg)   Height: 5' 8\" (1.727 m)       Physical Exam  Vitals and nursing note reviewed. Constitutional:       General: He is not in acute distress. Appearance: He is well-developed. He is not diaphoretic. HENT:      Head: Normocephalic and atraumatic. Right Ear: External ear normal.      Left Ear: External ear normal.      Nose: Nose normal.      Mouth/Throat:      Pharynx: No oropharyngeal exudate. Eyes:      General: No scleral icterus. Right eye: No discharge.          Left eye: No discharge. Conjunctiva/sclera: Conjunctivae normal.      Pupils: Pupils are equal, round, and reactive to light. Neck:      Thyroid: No thyromegaly. Cardiovascular:      Rate and Rhythm: Normal rate and regular rhythm. Heart sounds: Normal heart sounds. No murmur heard. No friction rub. No gallop. Pulmonary:      Effort: Pulmonary effort is normal. No respiratory distress. Breath sounds: Normal breath sounds. No wheezing or rales. Chest:      Chest wall: No tenderness. Abdominal:      General: Bowel sounds are normal. There is no distension. Palpations: Abdomen is soft. Tenderness: There is no abdominal tenderness. There is no guarding or rebound. Musculoskeletal:         General: Tenderness present. Right shoulder: Normal.      Left shoulder: Normal.      Right wrist: Normal.      Right hand: Normal.      Left hand: Normal.      Cervical back: Full passive range of motion without pain, normal range of motion and neck supple. No edema, erythema or rigidity. No muscular tenderness. Normal range of motion. Thoracic back: Tenderness present. Lumbar back: Spasms, tenderness and bony tenderness present. Decreased range of motion. Back:       Right hip: Tenderness and bony tenderness present. Decreased range of motion. Decreased strength. Left hip: Tenderness and bony tenderness present. Decreased range of motion. Decreased strength. Right upper leg: Tenderness present. Left upper leg: Normal.      Right knee: Swelling present. Decreased range of motion. Tenderness present. Left knee: Tenderness present. Right lower leg: Tenderness present. Left lower leg: Normal.      Right ankle: Tenderness present. Left ankle: Normal.      Right foot: Tenderness present. Left foot: Normal.      Comments: Right elbow skin tear form carpet burn dressing dry intact   Skin:     General: Skin is warm. Coloration: Skin is not pale. Findings: No erythema or rash. Neurological:      Mental Status: He is alert and oriented to person, place, and time. He is not disoriented. Cranial Nerves: No cranial nerve deficit. Sensory: Sensation is intact. No sensory deficit. Motor: Motor function is intact. No atrophy or abnormal muscle tone. Coordination: Coordination is intact. Coordination normal.      Gait: Gait abnormal.      Deep Tendon Reflexes: Reflexes are normal and symmetric. Babinski sign absent on the right side. Reflex Scores:       Tricep reflexes are 2+ on the right side and 2+ on the left side. Bicep reflexes are 2+ on the right side and 2+ on the left side. Brachioradialis reflexes are 2+ on the right side and 2+ on the left side. Patellar reflexes are 2+ on the right side and 2+ on the left side. Achilles reflexes are 2+ on the right side and 2+ on the left side. Comments: Motor 4/5 RLE   SLR + RLE    Psychiatric:         Attention and Perception: Attention normal. He is attentive. Mood and Affect: Mood and affect normal. Mood is not anxious or depressed. Affect is not labile, blunt, angry or inappropriate. Speech: Speech normal.         Behavior: Behavior normal. Behavior is not agitated, slowed, aggressive, withdrawn, hyperactive or combative. Thought Content: Thought content normal. Thought content is not paranoid or delusional. Thought content does not include homicidal or suicidal ideation. Thought content does not include homicidal or suicidal plan. Cognition and Memory: Cognition normal. Memory is impaired. He does not exhibit impaired recent memory or impaired remote memory. Judgment: Judgment normal. Judgment is not impulsive or inappropriate. Comments: forgetful     IONA  Patricks test  positive  Yeoman's  or Gaenslen's positive  Kemps  positive  Spurlings  positive  Aguilar's na         Assessment:     1.  Lumbosacral radiculitis    2. Chronic pain syndrome    3. Spondylosis of lumbar region without myelopathy or radiculopathy    4. Spinal stenosis of lumbar region without neurogenic claudication    5. Sacroiliac joint dysfunction    6. Chronic, continuous use of opioids            Plan:      OARRS reviewed. Current MED: 23.5  Patient was not offered naloxone for home. Testing Labs or Radiology reviewed:  Lumbar cervical knee   Procedures:none discussed many   Discussed with patient about risks with procedure including infection, reaction to medication, increased pain, or bleeding. Medications:Butrans patch LEEROY Tramadol  BIOMED  Neurontin    Meds. Prescribed:   Orders Placed This Encounter   Medications    buprenorphine (BUPRENEX) 7.5 MCG/HR PTWK     Sig: Place 1 patch onto the skin once a week for 28 days. Max Daily Amount: 1 patch     Dispense:  4 patch     Refill:  0    traMADol (ULTRAM) 50 MG tablet     Sig: Take 1 tablet by mouth 2 times daily as needed for Pain for up to 30 days. Max Daily Amount: 100 mg     Dispense:  60 tablet     Refill:  0     Reduce doses taken as pain becomes manageable         Return in about 3 months (around 4/30/2023).                Electronically signed by MEGHANN Huggins CNP on1/30/2023 at 1:43 PM

## 2023-01-30 NOTE — PROGRESS NOTES
[address] [facility] [address]  Dept: [phone]  Dept Fax: [phone]: [phone]    Visit Date: 1/30/2023    Functionality Assessment/Goals Worksheet     On a scale of 0 (Does not Interfere) to 10 (Completely Interferes)     1. Which number describes how during the past week pain has interfered with       the following:  A. General Activity:  6  B. Mood: 1  C. Walking Ability:  5  D. Normal Work (Includes both work outside the home and housework):  5  E. Relations with Other People:   1  F. Sleep:   3  G. Enjoyment of Life:   5    2. Patient Prefers to Take their Pain Medications:     []  On a regular basis   [x]  Only when necessary    []  Does not take pain medications    3. What are the Patient's Goals/Expectations for Visiting Pain Management?      []  Learn about my pain    []  Receive Medication   []  Physical Therapy     []  Treat Depression   []  Receive Injections    []  Treat Sleep   []  Deal with Anxiety and Stress   []  Treat Opoid Dependence/Addiction   []  Other:

## 2023-03-23 DIAGNOSIS — M54.17 LUMBOSACRAL RADICULITIS: ICD-10-CM

## 2023-03-23 DIAGNOSIS — G89.4 CHRONIC PAIN SYNDROME: ICD-10-CM

## 2023-03-23 RX ORDER — BUPRENORPHINE 7.5 UG/H
1 PATCH TRANSDERMAL WEEKLY
Qty: 4 PATCH | Refills: 0 | Status: SHIPPED | OUTPATIENT
Start: 2023-03-23 | End: 2023-04-20

## 2023-03-23 NOTE — TELEPHONE ENCOUNTER
Patient calling in for this also. Patient was last seen 1/30/2023 and his next appt is 5/1/2023. Please advise.

## 2023-03-23 NOTE — TELEPHONE ENCOUNTER
OARRS reviewed. UDS: + for  buprenorphine tramadol consistent. Last seen: 1/30/2023.  Follow-up:   Future Appointments   Date Time Provider Pancho Clark   5/1/2023  1:20 PM MEGHANN Cadet - CNP N [facility] Pain [facility] [address] [facility]

## 2023-05-19 DIAGNOSIS — M54.17 LUMBOSACRAL RADICULITIS: ICD-10-CM

## 2023-05-19 DIAGNOSIS — G89.4 CHRONIC PAIN SYNDROME: ICD-10-CM

## 2023-05-22 RX ORDER — BUPRENORPHINE 7.5 UG/H
1 PATCH TRANSDERMAL WEEKLY
Qty: 4 PATCH | Refills: 0 | OUTPATIENT
Start: 2023-05-22 | End: 2023-06-19

## 2023-06-01 ENCOUNTER — OFFICE VISIT (OUTPATIENT)
Dept: PHYSICAL MEDICINE AND REHAB | Age: 88
End: 2023-06-01
Payer: MEDICARE

## 2023-06-01 VITALS
HEIGHT: 68 IN | SYSTOLIC BLOOD PRESSURE: 108 MMHG | DIASTOLIC BLOOD PRESSURE: 58 MMHG | BODY MASS INDEX: 26.52 KG/M2 | WEIGHT: 175 LBS | HEART RATE: 68 BPM

## 2023-06-01 DIAGNOSIS — G89.4 CHRONIC PAIN SYNDROME: ICD-10-CM

## 2023-06-01 DIAGNOSIS — M48.061 SPINAL STENOSIS OF LUMBAR REGION WITHOUT NEUROGENIC CLAUDICATION: ICD-10-CM

## 2023-06-01 DIAGNOSIS — F11.90 CHRONIC, CONTINUOUS USE OF OPIOIDS: ICD-10-CM

## 2023-06-01 DIAGNOSIS — M54.17 LUMBOSACRAL RADICULITIS: Primary | ICD-10-CM

## 2023-06-01 DIAGNOSIS — M47.816 SPONDYLOSIS OF LUMBAR REGION WITHOUT MYELOPATHY OR RADICULOPATHY: ICD-10-CM

## 2023-06-01 DIAGNOSIS — M53.3 SACROILIAC JOINT DYSFUNCTION: ICD-10-CM

## 2023-06-01 PROCEDURE — 1123F ACP DISCUSS/DSCN MKR DOCD: CPT | Performed by: NURSE PRACTITIONER

## 2023-06-01 PROCEDURE — 99214 OFFICE O/P EST MOD 30 MIN: CPT | Performed by: NURSE PRACTITIONER

## 2023-06-01 PROCEDURE — 1036F TOBACCO NON-USER: CPT | Performed by: NURSE PRACTITIONER

## 2023-06-01 PROCEDURE — G8427 DOCREV CUR MEDS BY ELIG CLIN: HCPCS | Performed by: NURSE PRACTITIONER

## 2023-06-01 PROCEDURE — G8417 CALC BMI ABV UP PARAM F/U: HCPCS | Performed by: NURSE PRACTITIONER

## 2023-06-01 RX ORDER — BUPRENORPHINE 7.5 UG/H
1 PATCH TRANSDERMAL WEEKLY
Qty: 4 PATCH | Refills: 0 | Status: SHIPPED | OUTPATIENT
Start: 2023-06-01 | End: 2023-06-29

## 2023-06-01 RX ORDER — TRAMADOL HYDROCHLORIDE 50 MG/1
50 TABLET ORAL 2 TIMES DAILY PRN
Qty: 60 TABLET | Refills: 0 | Status: SHIPPED | OUTPATIENT
Start: 2023-06-01 | End: 2023-07-01

## 2023-06-01 ASSESSMENT — ENCOUNTER SYMPTOMS
NAUSEA: 0
SORE THROAT: 0
PHOTOPHOBIA: 0
COLOR CHANGE: 0
SHORTNESS OF BREATH: 0
ABDOMINAL PAIN: 0
DIARRHEA: 0
COUGH: 0
WHEEZING: 0
VOMITING: 0
CONSTIPATION: 0
EYE PAIN: 0
CHEST TIGHTNESS: 0
BACK PAIN: 1
RHINORRHEA: 0
SINUS PRESSURE: 0

## 2023-06-01 NOTE — PROGRESS NOTES
[address] [facility] [address]  Dept: [phone]  Dept Fax: [phone]: [phone]    Visit Date: 6/1/2023    Functionality Assessment/Goals Worksheet     On a scale of 0 (Does not Interfere) to 10 (Completely Interferes)     1. Which number describes how during the past week pain has interfered with       the following:  A. General Activity:  3  B. Mood: 2  C. Walking Ability:  4  D. Normal Work (Includes both work outside the home and housework):  6  E. Relations with Other People:   0  F. Sleep:   3  G. Enjoyment of Life:   3    2. Patient Prefers to Take their Pain Medications:     [x]  On a regular basis   []  Only when necessary    []  Does not take pain medications    3. What are the Patient's Goals/Expectations for Visiting Pain Management?      []  Learn about my pain    [x]  Receive Medication   []  Physical Therapy     []  Treat Depression   []  Receive Injections    []  Treat Sleep   []  Deal with Anxiety and Stress   []  Treat Opoid Dependence/Addiction   []  Other:

## 2023-06-01 NOTE — PROGRESS NOTES
HPI:   Cathy Blood is a 80 y.o. male is here today for    Chief Complaint: Lumbar back pain, Hip pain, Knee pain , and SI pain      F/U   here with rollator walker. Has not been here since Jan 2023  he had  pacer battery change and wife has been in hospital for 3 months she broke her wrist. She  is home now. He continues to have low back mid back hip SI  BLE pain. He has increased pain with walking standing bending lifting twisting turning. Pain increases with bending, lifting, twisting , reaching, pushing, pulling, walking, standing, stairs and getting up and down. Treatments Tried ice, heat, NSAIDS, narcotics, muscle relaxer, OTC rubs creams patches, steroid burst and injections  Pain Description sharp, stabbing, burning, throbbing, aching and numbness     He denies any ER visits or new health issues since last visit. Pain scale with out pain medications or at its worst is 3/10. Pain scale with pain medications or at its best is denies/10. Last dose of Tramadol  today  Butrans 3 weeks ago has been out   Drug screen reviewed from 1/2023 and was appropriate  Pill count completed  today and WNL: No         Prior Injections:    He has had LESI L5 left in past with great releif for 18 months. He has had other injections  without relief      Radiology:            The patienthas No Known Allergies. Subjective:      Review of Systems   Constitutional:  Positive for activity change. Negative for appetite change, chills, diaphoresis, fatigue, fever and unexpected weight change. HENT:  Negative for congestion, ear pain, hearing loss, mouth sores, nosebleeds, rhinorrhea, sinus pressure and sore throat. Eyes:  Negative for photophobia, pain and visual disturbance. Respiratory:  Negative for cough, chest tightness, shortness of breath and wheezing. PATRICIA has ASTHMA   Cardiovascular:  Negative for chest pain and palpitations.         MVR 1997 CAD HTN   Gastrointestinal:  Negative for

## 2023-06-29 DIAGNOSIS — M54.17 LUMBOSACRAL RADICULITIS: ICD-10-CM

## 2023-06-29 DIAGNOSIS — G89.4 CHRONIC PAIN SYNDROME: ICD-10-CM

## 2023-06-29 RX ORDER — BUPRENORPHINE 7.5 UG/H
1 PATCH TRANSDERMAL WEEKLY
Qty: 4 PATCH | Refills: 0 | Status: SHIPPED | OUTPATIENT
Start: 2023-06-29 | End: 2023-07-27

## 2023-08-15 DIAGNOSIS — M54.17 LUMBOSACRAL RADICULITIS: ICD-10-CM

## 2023-08-15 DIAGNOSIS — G89.4 CHRONIC PAIN SYNDROME: ICD-10-CM

## 2023-08-15 RX ORDER — BUPRENORPHINE 7.5 UG/H
1 PATCH TRANSDERMAL WEEKLY
Qty: 4 PATCH | Refills: 0 | Status: SHIPPED | OUTPATIENT
Start: 2023-08-15 | End: 2023-09-14

## 2023-08-15 NOTE — TELEPHONE ENCOUNTER
OARRS reviewed. UDS: + for  Tramadol. Last seen: 6/1/2023.  Follow-up:   Future Appointments   Date Time Provider [address]   8/31/2023  3:00 PM Rosa Maye, MEGHANN - CNP N SRPX Pain [facility] - [place]

## 2023-08-31 ENCOUNTER — OFFICE VISIT (OUTPATIENT)
Dept: PHYSICAL MEDICINE AND REHAB | Age: 88
End: 2023-08-31
Payer: MEDICARE

## 2023-08-31 VITALS
SYSTOLIC BLOOD PRESSURE: 112 MMHG | BODY MASS INDEX: 25.01 KG/M2 | HEART RATE: 74 BPM | WEIGHT: 165 LBS | HEIGHT: 68 IN | DIASTOLIC BLOOD PRESSURE: 60 MMHG

## 2023-08-31 DIAGNOSIS — G89.4 CHRONIC PAIN SYNDROME: ICD-10-CM

## 2023-08-31 DIAGNOSIS — M53.3 SACROILIAC JOINT DYSFUNCTION: ICD-10-CM

## 2023-08-31 DIAGNOSIS — M47.816 SPONDYLOSIS OF LUMBAR REGION WITHOUT MYELOPATHY OR RADICULOPATHY: ICD-10-CM

## 2023-08-31 DIAGNOSIS — M48.061 SPINAL STENOSIS OF LUMBAR REGION WITHOUT NEUROGENIC CLAUDICATION: ICD-10-CM

## 2023-08-31 DIAGNOSIS — M54.17 LUMBOSACRAL RADICULITIS: Primary | ICD-10-CM

## 2023-08-31 PROCEDURE — 99214 OFFICE O/P EST MOD 30 MIN: CPT | Performed by: NURSE PRACTITIONER

## 2023-08-31 PROCEDURE — 1036F TOBACCO NON-USER: CPT | Performed by: NURSE PRACTITIONER

## 2023-08-31 PROCEDURE — 1123F ACP DISCUSS/DSCN MKR DOCD: CPT | Performed by: NURSE PRACTITIONER

## 2023-08-31 PROCEDURE — G8417 CALC BMI ABV UP PARAM F/U: HCPCS | Performed by: NURSE PRACTITIONER

## 2023-08-31 PROCEDURE — G8427 DOCREV CUR MEDS BY ELIG CLIN: HCPCS | Performed by: NURSE PRACTITIONER

## 2023-08-31 ASSESSMENT — ENCOUNTER SYMPTOMS
SORE THROAT: 0
CONSTIPATION: 0
COLOR CHANGE: 0
SHORTNESS OF BREATH: 0
BACK PAIN: 1
CHEST TIGHTNESS: 0
RHINORRHEA: 0
PHOTOPHOBIA: 0
COUGH: 0
WHEEZING: 0
SINUS PRESSURE: 0
ABDOMINAL PAIN: 0
DIARRHEA: 0
NAUSEA: 0
EYE PAIN: 0
VOMITING: 0

## 2023-08-31 NOTE — PROGRESS NOTES
bruise/bleed easily. Psychiatric/Behavioral:  Negative for agitation, behavioral problems, confusion, decreased concentration, dysphoric mood, hallucinations, self-injury, sleep disturbance and suicidal ideas. The patient is not nervous/anxious and is not hyperactive. Objective:     Vitals:    08/31/23 1456   BP: 112/60   Site: Right Upper Arm   Position: Sitting   Cuff Size: Medium Adult   Pulse: 74   Weight: 165 lb (74.8 kg)   Height: 5' 8\" (1.727 m)       Physical Exam  Vitals and nursing note reviewed. Constitutional:       General: He is not in acute distress. Appearance: He is well-developed. He is not diaphoretic. HENT:      Head: Normocephalic and atraumatic. Right Ear: External ear normal.      Left Ear: External ear normal.      Nose: Nose normal.      Mouth/Throat:      Pharynx: No oropharyngeal exudate. Eyes:      General: No scleral icterus. Right eye: No discharge. Left eye: No discharge. Conjunctiva/sclera: Conjunctivae normal.      Pupils: Pupils are equal, round, and reactive to light. Neck:      Thyroid: No thyromegaly. Cardiovascular:      Rate and Rhythm: Normal rate and regular rhythm. Heart sounds: Normal heart sounds. No murmur heard. No friction rub. No gallop. Pulmonary:      Effort: Pulmonary effort is normal. No respiratory distress. Breath sounds: Normal breath sounds. No wheezing or rales. Chest:      Chest wall: No tenderness. Abdominal:      General: Bowel sounds are normal. There is no distension. Palpations: Abdomen is soft. Tenderness: There is no abdominal tenderness. There is no guarding or rebound. Musculoskeletal:         General: Tenderness present. Right shoulder: Normal.      Left shoulder: Normal.      Right wrist: Normal.      Right hand: Normal.      Left hand: Normal.      Cervical back: Full passive range of motion without pain, normal range of motion and neck supple.  No edema, erythema or

## 2023-10-06 DIAGNOSIS — G89.4 CHRONIC PAIN SYNDROME: ICD-10-CM

## 2023-10-06 DIAGNOSIS — M54.17 LUMBOSACRAL RADICULITIS: ICD-10-CM

## 2023-10-09 RX ORDER — BUPRENORPHINE 7.5 UG/H
1 PATCH TRANSDERMAL WEEKLY
Qty: 4 PATCH | Refills: 0 | Status: SHIPPED | OUTPATIENT
Start: 2023-10-09 | End: 2023-11-06

## 2023-10-09 NOTE — TELEPHONE ENCOUNTER
OARRS reviewed. UDS: + for  buprenorphine tramadol consistent. Last seen: 8/31/2023.  Follow-up:   Future Appointments   Date Time Provider [address]   12/7/2023  3:00 PM Pancho Briones, APRN - CNP N [facility] Pain [address]

## 2023-11-16 DIAGNOSIS — F11.90 CHRONIC, CONTINUOUS USE OF OPIOIDS: ICD-10-CM

## 2023-11-16 DIAGNOSIS — M47.816 SPONDYLOSIS OF LUMBAR REGION WITHOUT MYELOPATHY OR RADICULOPATHY: ICD-10-CM

## 2023-11-16 DIAGNOSIS — M48.061 SPINAL STENOSIS OF LUMBAR REGION WITHOUT NEUROGENIC CLAUDICATION: ICD-10-CM

## 2023-11-16 DIAGNOSIS — M53.3 SACROILIAC JOINT DYSFUNCTION: ICD-10-CM

## 2023-11-16 DIAGNOSIS — G89.4 CHRONIC PAIN SYNDROME: ICD-10-CM

## 2023-11-16 DIAGNOSIS — M54.17 LUMBOSACRAL RADICULITIS: ICD-10-CM

## 2023-11-16 RX ORDER — BUPRENORPHINE 7.5 UG/H
1 PATCH TRANSDERMAL WEEKLY
Qty: 4 PATCH | Refills: 0 | Status: SHIPPED | OUTPATIENT
Start: 2023-11-16 | End: 2023-12-14

## 2023-11-16 NOTE — TELEPHONE ENCOUNTER
OARRS reviewed. UDS: + for  . Tramadol, buprenorphine. Negative gabapentin  Last seen: 8/31/2023. Follow-up:   Future Appointments   Date Time Provider [address]   12/7/2023  3:00 PM Bin Briones, APRN - CNP N [facility] Pain [facility] [place]    Pt.  At [facility] and they requesting script for butran patch

## 2023-11-16 NOTE — TELEPHONE ENCOUNTER
[facility] called and now need order for tramadol. Pt. Has a supply and they will not actually fill. . Setting up order.

## 2023-11-17 RX ORDER — TRAMADOL HYDROCHLORIDE 50 MG/1
50 TABLET ORAL 2 TIMES DAILY PRN
Qty: 60 TABLET | Refills: 0 | Status: SHIPPED | OUTPATIENT
Start: 2023-11-17 | End: 2023-12-17

## 2023-12-07 ENCOUNTER — OFFICE VISIT (OUTPATIENT)
Dept: PHYSICAL MEDICINE AND REHAB | Age: 88
End: 2023-12-07
Payer: MEDICARE

## 2023-12-07 VITALS
DIASTOLIC BLOOD PRESSURE: 48 MMHG | WEIGHT: 162 LBS | SYSTOLIC BLOOD PRESSURE: 88 MMHG | HEIGHT: 68 IN | HEART RATE: 68 BPM | BODY MASS INDEX: 24.55 KG/M2

## 2023-12-07 DIAGNOSIS — M47.816 SPONDYLOSIS OF LUMBAR REGION WITHOUT MYELOPATHY OR RADICULOPATHY: ICD-10-CM

## 2023-12-07 DIAGNOSIS — G62.9 NEUROPATHY: Primary | ICD-10-CM

## 2023-12-07 DIAGNOSIS — F11.90 CHRONIC, CONTINUOUS USE OF OPIOIDS: ICD-10-CM

## 2023-12-07 DIAGNOSIS — G89.4 CHRONIC PAIN SYNDROME: ICD-10-CM

## 2023-12-07 DIAGNOSIS — M48.061 SPINAL STENOSIS OF LUMBAR REGION WITHOUT NEUROGENIC CLAUDICATION: ICD-10-CM

## 2023-12-07 DIAGNOSIS — M54.17 LUMBOSACRAL RADICULITIS: ICD-10-CM

## 2023-12-07 DIAGNOSIS — M47.816 LUMBAR SPONDYLOSIS: ICD-10-CM

## 2023-12-07 DIAGNOSIS — M53.3 SACROILIAC JOINT DYSFUNCTION: ICD-10-CM

## 2023-12-07 PROCEDURE — G8484 FLU IMMUNIZE NO ADMIN: HCPCS | Performed by: NURSE PRACTITIONER

## 2023-12-07 PROCEDURE — G8420 CALC BMI NORM PARAMETERS: HCPCS | Performed by: NURSE PRACTITIONER

## 2023-12-07 PROCEDURE — 99214 OFFICE O/P EST MOD 30 MIN: CPT | Performed by: NURSE PRACTITIONER

## 2023-12-07 PROCEDURE — 1036F TOBACCO NON-USER: CPT | Performed by: NURSE PRACTITIONER

## 2023-12-07 PROCEDURE — 1123F ACP DISCUSS/DSCN MKR DOCD: CPT | Performed by: NURSE PRACTITIONER

## 2023-12-07 PROCEDURE — G8427 DOCREV CUR MEDS BY ELIG CLIN: HCPCS | Performed by: NURSE PRACTITIONER

## 2023-12-07 RX ORDER — TRAMADOL HYDROCHLORIDE 50 MG/1
50 TABLET ORAL 2 TIMES DAILY PRN
Qty: 60 TABLET | Refills: 0 | Status: SHIPPED | OUTPATIENT
Start: 2023-12-17 | End: 2024-01-16

## 2023-12-07 RX ORDER — BUPRENORPHINE 7.5 UG/H
1 PATCH TRANSDERMAL WEEKLY
Qty: 4 PATCH | Refills: 0 | Status: SHIPPED | OUTPATIENT
Start: 2023-12-14 | End: 2024-01-11

## 2023-12-07 RX ORDER — GABAPENTIN 300 MG/1
300 CAPSULE ORAL NIGHTLY
Qty: 90 CAPSULE | Refills: 0 | Status: SHIPPED | OUTPATIENT
Start: 2023-12-07 | End: 2024-03-06

## 2023-12-07 ASSESSMENT — ENCOUNTER SYMPTOMS
SHORTNESS OF BREATH: 0
CONSTIPATION: 0
WHEEZING: 0
ABDOMINAL PAIN: 0
DIARRHEA: 0
COLOR CHANGE: 0
CHEST TIGHTNESS: 0
SINUS PRESSURE: 0
SORE THROAT: 0
VOMITING: 0
PHOTOPHOBIA: 0
EYE PAIN: 0
RHINORRHEA: 0
NAUSEA: 0
COUGH: 0
BACK PAIN: 1

## 2023-12-20 DIAGNOSIS — M54.17 LUMBOSACRAL RADICULITIS: ICD-10-CM

## 2023-12-20 DIAGNOSIS — G89.4 CHRONIC PAIN SYNDROME: ICD-10-CM

## 2023-12-20 RX ORDER — BUPRENORPHINE 7.5 UG/H
1 PATCH TRANSDERMAL WEEKLY
Qty: 4 PATCH | Refills: 0 | OUTPATIENT
Start: 2023-12-20 | End: 2024-01-17

## 2023-12-20 NOTE — TELEPHONE ENCOUNTER
Aiden Oconnor called requesting a refill on the following medications:  Requested Prescriptions     Pending Prescriptions Disp Refills    buprenorphine (BUPRENEX) 7.5 MCG/HR PTWK 4 patch 0     Sig: Place 1 patch onto the skin once a week for 28 days. Max Daily Amount: 1 patch     Pharmacy verified: [place] [facility]   .pv      Date of last visit: 12/07/2023  Date of next visit (if applicable): 3/7/2024

## 2024-02-08 DIAGNOSIS — G89.4 CHRONIC PAIN SYNDROME: Primary | ICD-10-CM

## 2024-02-08 RX ORDER — BUPRENORPHINE 7.5 UG/H
1 PATCH TRANSDERMAL WEEKLY
Qty: 4 PATCH | Refills: 0 | Status: SHIPPED | OUTPATIENT
Start: 2024-02-08 | End: 2024-03-07

## 2024-02-08 NOTE — TELEPHONE ENCOUNTER
OARRS reviewed. UDS: + for  tramadol consistent.   Last seen: 12/7/2023. Follow-up:   Future Appointments   Date Time Provider Department Center   3/7/2024  3:20 PM Fabby Briones APRN - CNP N [facility] Pain [facility] - [place]

## 2024-03-07 ENCOUNTER — OFFICE VISIT (OUTPATIENT)
Dept: PHYSICAL MEDICINE AND REHAB | Age: 89
End: 2024-03-07
Payer: MEDICARE

## 2024-03-07 VITALS
BODY MASS INDEX: 24.86 KG/M2 | HEIGHT: 68 IN | DIASTOLIC BLOOD PRESSURE: 70 MMHG | WEIGHT: 164 LBS | SYSTOLIC BLOOD PRESSURE: 114 MMHG | HEART RATE: 72 BPM

## 2024-03-07 DIAGNOSIS — M53.3 SACROILIAC JOINT DYSFUNCTION: ICD-10-CM

## 2024-03-07 DIAGNOSIS — M47.816 LUMBAR SPONDYLOSIS: Primary | ICD-10-CM

## 2024-03-07 DIAGNOSIS — M54.17 LUMBOSACRAL RADICULITIS: ICD-10-CM

## 2024-03-07 DIAGNOSIS — G89.4 CHRONIC PAIN SYNDROME: ICD-10-CM

## 2024-03-07 DIAGNOSIS — G62.9 NEUROPATHY: ICD-10-CM

## 2024-03-07 PROCEDURE — G8427 DOCREV CUR MEDS BY ELIG CLIN: HCPCS | Performed by: NURSE PRACTITIONER

## 2024-03-07 PROCEDURE — G8484 FLU IMMUNIZE NO ADMIN: HCPCS | Performed by: NURSE PRACTITIONER

## 2024-03-07 PROCEDURE — 99214 OFFICE O/P EST MOD 30 MIN: CPT | Performed by: NURSE PRACTITIONER

## 2024-03-07 PROCEDURE — G8420 CALC BMI NORM PARAMETERS: HCPCS | Performed by: NURSE PRACTITIONER

## 2024-03-07 PROCEDURE — 1123F ACP DISCUSS/DSCN MKR DOCD: CPT | Performed by: NURSE PRACTITIONER

## 2024-03-07 PROCEDURE — 1036F TOBACCO NON-USER: CPT | Performed by: NURSE PRACTITIONER

## 2024-03-07 RX ORDER — GABAPENTIN 300 MG/1
300 CAPSULE ORAL NIGHTLY
Qty: 90 CAPSULE | Refills: 1 | Status: SHIPPED | OUTPATIENT
Start: 2024-03-07 | End: 2024-09-03

## 2024-03-07 RX ORDER — BUPRENORPHINE 7.5 UG/H
1 PATCH TRANSDERMAL WEEKLY
Qty: 4 PATCH | Refills: 0 | Status: SHIPPED | OUTPATIENT
Start: 2024-03-07 | End: 2024-04-04

## 2024-03-07 RX ORDER — TRAMADOL HYDROCHLORIDE 50 MG/1
50 TABLET ORAL 2 TIMES DAILY PRN
Qty: 60 TABLET | Refills: 0 | Status: SHIPPED | OUTPATIENT
Start: 2024-03-07 | End: 2024-04-06

## 2024-03-07 ASSESSMENT — ENCOUNTER SYMPTOMS
COUGH: 0
ABDOMINAL PAIN: 0
WHEEZING: 0
RHINORRHEA: 0
CONSTIPATION: 0
PHOTOPHOBIA: 0
NAUSEA: 0
SORE THROAT: 0
DIARRHEA: 0
CHEST TIGHTNESS: 0
COLOR CHANGE: 0
SINUS PRESSURE: 0
VOMITING: 0
EYE PAIN: 0
SHORTNESS OF BREATH: 0
BACK PAIN: 1

## 2024-03-07 NOTE — PROGRESS NOTES
Functionality Assessment/Goals Worksheet     On a scale of 0 (Does not Interfere) to 10 (Completely Interferes)     1.  Which number describes how during the past week pain has interfered with           the following:  A.  General Activity:  4  B.  Mood: 2  C.  Walking Ability:  4  D.  Normal Work (Includes both work outside the home and housework):  3  E.  Relations with Other People:   1  F.  Sleep:   2  G.  Enjoyment of Life:   3    2.  Patient Prefers to Take their Pain Medications:     [x]  On a regular basis   []  Only when necessary    []  Does not take pain medications    3.  What are the Patient's Goals/Expectations for Visiting Pain Management?     []  Learn about my pain    [x]  Receive Medication   []  Physical Therapy     []  Treat Depression   []  Receive Injections    []  Treat Sleep   []  Deal with Anxiety and Stress   []  Treat Opoid Dependence/Addiction   []  Other:     HPI:   Aiden Oconnor is a 89 y.o. male is here today for    Chief Complaint: Lumbar back pain, Hip pain, Knee pain , and SI pain      F/U No new health issues. Here with rollator walker. Moved to assisted living apartment last week at [facility]. Taking Tramadol  1-2 a week  maybe takes sparingly. Also using Butrans patch states helping make pian  tolerable manageable.   He continues to have low back mid back hip SI  BLE pain.  He has increased pain with walking standing bending lifting twisting turning. The pain resolves with rest sitting laying,   Pain increases with bending, lifting, twisting , reaching, pushing, pulling, walking, standing, stairs and getting up and down.  Treatments Tried ice, heat, NSAIDS, narcotics, muscle relaxer, OTC rubs creams patches, steroid burst and injections  Pain Description sharp, stabbing, burning, throbbing, aching and numbness     He denies any ER visits or new health issues since last visit.    Pain scale with out pain medications or at its worst is 5/10. Lumbar SI   Pain scale with pain

## 2024-03-07 NOTE — PROGRESS NOTES
Functionality Assessment/Goals Worksheet     On a scale of 0 (Does not Interfere) to 10 (Completely Interferes)     1.  Which number describes how during the past week pain has interfered with           the following:  A.  General Activity:  3  B.  Mood: 2  C.  Walking Ability:  5  D.  Normal Work (Includes both work outside the home and housework):  4  E.  Relations with Other People:   2  F.  Sleep:   1  G.  Enjoyment of Life:   3    2.  Patient Prefers to Take their Pain Medications:     [x]  On a regular basis   []  Only when necessary    []  Does not take pain medications    3.  What are the Patient's Goals/Expectations for Visiting Pain Management?     [x]  Learn about my pain    []  Receive Medication   []  Physical Therapy     []  Treat Depression   []  Receive Injections    []  Treat Sleep   []  Deal with Anxiety and Stress   []  Treat Opoid Dependence/Addiction   []  Other:

## 2024-03-11 ENCOUNTER — TELEPHONE (OUTPATIENT)
Dept: PHYSICAL MEDICINE AND REHAB | Age: 89
End: 2024-03-11

## 2024-03-11 NOTE — TELEPHONE ENCOUNTER
[facility] nursing staff called and pt. Says that he was told to stop the gabapentin by the neurologist and he does not want to take. He has been refusing. The nursing home is sending over a form that needs signed that ok to stop.

## 2024-04-08 DIAGNOSIS — G89.4 CHRONIC PAIN SYNDROME: ICD-10-CM

## 2024-04-09 DIAGNOSIS — G89.4 CHRONIC PAIN SYNDROME: ICD-10-CM

## 2024-04-09 RX ORDER — BUPRENORPHINE 7.5 UG/H
1 PATCH TRANSDERMAL WEEKLY
Qty: 4 PATCH | Refills: 0 | Status: SHIPPED | OUTPATIENT
Start: 2024-04-09 | End: 2024-05-07

## 2024-04-09 NOTE — TELEPHONE ENCOUNTER
OARRS reviewed. UDS: + for  buprenorphine consistent.   Last seen: 3/7/2024. Follow-up:   Future Appointments   Date Time Provider Department Center   6/6/2024  3:00 PM Fabby Briones APRN - CNP N [facility] - [place]

## 2024-04-09 NOTE — TELEPHONE ENCOUNTER
OARRS reviewed. UDS: + for  buprenorphine. Gabapentin and tramadol is non-compliant.  Last seen: 3/7/2024. Follow-up: 6/6/24

## 2024-04-10 RX ORDER — BUPRENORPHINE 7.5 UG/H
1 PATCH TRANSDERMAL WEEKLY
Qty: 4 PATCH | Refills: 0 | Status: SHIPPED | OUTPATIENT
Start: 2024-04-10 | End: 2024-05-08

## 2024-06-05 DIAGNOSIS — G89.4 CHRONIC PAIN SYNDROME: ICD-10-CM

## 2024-06-05 DIAGNOSIS — M54.17 LUMBOSACRAL RADICULITIS: ICD-10-CM

## 2024-06-05 DIAGNOSIS — M53.3 SACROILIAC JOINT DYSFUNCTION: ICD-10-CM

## 2024-06-05 DIAGNOSIS — G62.9 NEUROPATHY: ICD-10-CM

## 2024-06-05 DIAGNOSIS — M47.816 LUMBAR SPONDYLOSIS: ICD-10-CM

## 2024-06-05 NOTE — TELEPHONE ENCOUNTER
OARRS reviewed. UDS: + for  Buprenorphine.   Last seen: 3/7/2024. Follow-up:   Future Appointments   Date Time Provider Department Center   6/6/2024  3:00 PM Fabby Briones APRN - CNP N [facility] - [place]

## 2024-06-05 NOTE — TELEPHONE ENCOUNTER
Aiden Oconnor called requesting a refill on the following medications:  Requested Prescriptions     Pending Prescriptions Disp Refills    buprenorphine (BUPRENEX) 7.5 MCG/HR PTWK 4 patch 0     Sig: Place 1 patch onto the skin once a week for 28 days. Max Daily Amount: 1 patch     Pharmacy verified: [place] [facility] in [place]  .      Date of last visit: 3/07/2024  Date of next visit (if applicable): Visit date not found

## 2024-06-06 ENCOUNTER — OFFICE VISIT (OUTPATIENT)
Dept: PHYSICAL MEDICINE AND REHAB | Age: 89
End: 2024-06-06
Payer: MEDICARE

## 2024-06-06 VITALS
DIASTOLIC BLOOD PRESSURE: 84 MMHG | HEIGHT: 68 IN | WEIGHT: 164.02 LBS | BODY MASS INDEX: 24.86 KG/M2 | SYSTOLIC BLOOD PRESSURE: 128 MMHG

## 2024-06-06 DIAGNOSIS — M53.3 SACROILIAC JOINT DYSFUNCTION: ICD-10-CM

## 2024-06-06 DIAGNOSIS — M54.17 LUMBOSACRAL RADICULITIS: ICD-10-CM

## 2024-06-06 DIAGNOSIS — G62.9 NEUROPATHY: ICD-10-CM

## 2024-06-06 DIAGNOSIS — G89.4 CHRONIC PAIN SYNDROME: ICD-10-CM

## 2024-06-06 DIAGNOSIS — M47.816 LUMBAR SPONDYLOSIS: Primary | ICD-10-CM

## 2024-06-06 PROCEDURE — 1036F TOBACCO NON-USER: CPT | Performed by: NURSE PRACTITIONER

## 2024-06-06 PROCEDURE — G8420 CALC BMI NORM PARAMETERS: HCPCS | Performed by: NURSE PRACTITIONER

## 2024-06-06 PROCEDURE — 1123F ACP DISCUSS/DSCN MKR DOCD: CPT | Performed by: NURSE PRACTITIONER

## 2024-06-06 PROCEDURE — 99214 OFFICE O/P EST MOD 30 MIN: CPT | Performed by: NURSE PRACTITIONER

## 2024-06-06 PROCEDURE — G8427 DOCREV CUR MEDS BY ELIG CLIN: HCPCS | Performed by: NURSE PRACTITIONER

## 2024-06-06 RX ORDER — TRAMADOL HYDROCHLORIDE 50 MG/1
50 TABLET ORAL 2 TIMES DAILY PRN
Qty: 60 TABLET | Refills: 0 | Status: SHIPPED | OUTPATIENT
Start: 2024-06-06 | End: 2024-07-06

## 2024-06-06 RX ORDER — BUPRENORPHINE 7.5 UG/H
1 PATCH TRANSDERMAL WEEKLY
Qty: 4 PATCH | Refills: 0 | Status: SHIPPED | OUTPATIENT
Start: 2024-06-06 | End: 2024-07-04

## 2024-06-06 ASSESSMENT — ENCOUNTER SYMPTOMS
PHOTOPHOBIA: 0
COUGH: 0
EYE PAIN: 0
SHORTNESS OF BREATH: 0
CHEST TIGHTNESS: 0
VOMITING: 0
SORE THROAT: 0
COLOR CHANGE: 0
RHINORRHEA: 0
CONSTIPATION: 0
WHEEZING: 0
SINUS PRESSURE: 0
ABDOMINAL PAIN: 0
DIARRHEA: 0
BACK PAIN: 1
NAUSEA: 0

## 2024-07-01 DIAGNOSIS — G89.4 CHRONIC PAIN SYNDROME: ICD-10-CM

## 2024-07-01 RX ORDER — BUPRENORPHINE 7.5 UG/H
1 PATCH TRANSDERMAL WEEKLY
Qty: 4 PATCH | Refills: 0 | OUTPATIENT
Start: 2024-07-01 | End: 2024-07-29

## 2024-07-08 DIAGNOSIS — G89.4 CHRONIC PAIN SYNDROME: ICD-10-CM

## 2024-07-08 RX ORDER — BUPRENORPHINE 7.5 UG/H
1 PATCH TRANSDERMAL WEEKLY
Qty: 4 PATCH | Refills: 0 | Status: SHIPPED | OUTPATIENT
Start: 2024-07-08 | End: 2024-07-11 | Stop reason: SDUPTHER

## 2024-07-08 NOTE — TELEPHONE ENCOUNTER
OARRS reviewed. UDS: + for  Tramadol, Non Compliant Gabapentin, Buprenorphine  Last seen: 6/6/2024. Follow-up: 9-19-24

## 2024-07-09 DIAGNOSIS — G62.9 NEUROPATHY: ICD-10-CM

## 2024-07-09 DIAGNOSIS — M54.17 LUMBOSACRAL RADICULITIS: ICD-10-CM

## 2024-07-09 DIAGNOSIS — G89.4 CHRONIC PAIN SYNDROME: ICD-10-CM

## 2024-07-09 DIAGNOSIS — M47.816 LUMBAR SPONDYLOSIS: ICD-10-CM

## 2024-07-09 DIAGNOSIS — M53.3 SACROILIAC JOINT DYSFUNCTION: ICD-10-CM

## 2024-07-09 RX ORDER — BUPRENORPHINE 7.5 UG/H
1 PATCH TRANSDERMAL WEEKLY
Qty: 4 PATCH | Refills: 0 | Status: CANCELLED | OUTPATIENT
Start: 2024-07-09 | End: 2024-08-06

## 2024-07-09 RX ORDER — TRAMADOL HYDROCHLORIDE 50 MG/1
50 TABLET ORAL 2 TIMES DAILY PRN
Qty: 60 TABLET | Refills: 0 | Status: CANCELLED | OUTPATIENT
Start: 2024-07-09 | End: 2024-08-08

## 2024-07-09 NOTE — TELEPHONE ENCOUNTER
Buprenorphine 7.5. MCG/HR PTWK  traMADol 50 MG tablet     [facility]  Last seen: 6/6/24     Feli from the Nursing Home called for these Medication Refills

## 2024-07-10 RX ORDER — TRAMADOL HYDROCHLORIDE 50 MG/1
50 TABLET ORAL EVERY 8 HOURS PRN
Qty: 90 TABLET | Refills: 0 | Status: SHIPPED | OUTPATIENT
Start: 2024-07-10 | End: 2024-08-09

## 2024-07-10 NOTE — TELEPHONE ENCOUNTER
OARRS reviewed. UDS: + for  tramadol. Gabapentin and buprenorphine are non-compliant.   Last seen: 6/6/2024. Follow-up: 9/19/2024

## 2024-07-11 DIAGNOSIS — G89.4 CHRONIC PAIN SYNDROME: ICD-10-CM

## 2024-07-11 RX ORDER — BUPRENORPHINE 7.5 UG/H
1 PATCH TRANSDERMAL WEEKLY
Qty: 4 PATCH | Refills: 0 | Status: SHIPPED | OUTPATIENT
Start: 2024-07-11 | End: 2024-08-08

## 2024-07-11 NOTE — TELEPHONE ENCOUNTER
Script was approved on 7-8-24, sent to [facility] pharmacy. Needs to go to [facility]. Script cancelled at [facility].

## 2024-07-11 NOTE — TELEPHONE ENCOUNTER
Script was approved on 7-8-24, it was sent to the wrong pharmacy. Needs to go to [facility] pharmacy, cancelled at [facility]

## 2024-07-11 NOTE — TELEPHONE ENCOUNTER
This is Fabby's patient. I filled it because I was covering on Monday. I did send in please cancel prescription at other pharmacy

## 2024-08-05 DIAGNOSIS — G89.4 CHRONIC PAIN SYNDROME: ICD-10-CM

## 2024-08-05 RX ORDER — BUPRENORPHINE 7.5 UG/H
1 PATCH TRANSDERMAL WEEKLY
Qty: 4 PATCH | Refills: 0 | Status: SHIPPED | OUTPATIENT
Start: 2024-08-05 | End: 2024-09-02

## 2024-08-05 NOTE — TELEPHONE ENCOUNTER
Pt. Still in [facility]. They request refill  OARRS reviewed. UDS: + for  . Tramadol. Negative gabapentin and buprenorphine  Last seen: 6/6/24. Follow-up:   Future Appointments   Date Time Provider Department Center   9/19/2024  1:40 PM Fabby Briones, APRN - CNP N [facility] Pain [facility] [place]

## 2024-08-20 ENCOUNTER — LAB (OUTPATIENT)
Dept: LAB | Age: 89
End: 2024-08-20

## 2024-08-20 LAB
BACTERIA URNS QL MICRO: ABNORMAL /HPF
BILIRUB UR QL STRIP.AUTO: NEGATIVE
CASTS #/AREA URNS LPF: ABNORMAL /LPF
CASTS 2: ABNORMAL /LPF
CHARACTER UR: CLEAR
COLOR, UA: YELLOW
CRYSTALS URNS MICRO: ABNORMAL
EPITHELIAL CELLS, UA: ABNORMAL /HPF
GLUCOSE UR QL STRIP.AUTO: NEGATIVE MG/DL
HGB UR QL STRIP.AUTO: NEGATIVE
KETONES UR QL STRIP.AUTO: ABNORMAL
MISCELLANEOUS 2: ABNORMAL
NITRITE UR QL STRIP: NEGATIVE
PH UR STRIP.AUTO: 5.5 [PH] (ref 5–9)
PROT UR STRIP.AUTO-MCNC: ABNORMAL MG/DL
RBC URINE: ABNORMAL /HPF
RENAL EPI CELLS #/AREA URNS HPF: ABNORMAL /[HPF]
SP GR UR REFRACT.AUTO: 1.02 (ref 1–1.03)
UROBILINOGEN, URINE: 0.2 EU/DL (ref 0–1)
WBC #/AREA URNS HPF: ABNORMAL /HPF
WBC #/AREA URNS HPF: NEGATIVE /[HPF]
YEAST LIKE FUNGI URNS QL MICRO: ABNORMAL

## 2024-08-27 DIAGNOSIS — G89.4 CHRONIC PAIN SYNDROME: Primary | ICD-10-CM

## 2024-08-27 RX ORDER — TRAMADOL HYDROCHLORIDE 50 MG/1
50 TABLET ORAL EVERY 8 HOURS PRN
Qty: 90 TABLET | Refills: 0 | Status: SHIPPED | OUTPATIENT
Start: 2024-08-27 | End: 2024-09-26

## 2024-09-03 DIAGNOSIS — G89.4 CHRONIC PAIN SYNDROME: ICD-10-CM

## 2024-09-03 RX ORDER — BUPRENORPHINE 7.5 UG/H
1 PATCH TRANSDERMAL WEEKLY
Qty: 4 PATCH | Refills: 0 | Status: SHIPPED | OUTPATIENT
Start: 2024-09-03 | End: 2024-10-01

## 2024-09-03 NOTE — TELEPHONE ENCOUNTER
OARRS reviewed. UDS: + for  tramadol consistent.   Last seen: 6/6/2024. Follow-up:   Future Appointments   Date Time Provider Department Center   9/19/2024  1:40 PM Fabby Briones APRN - CNP N [facility] Pain [facility] - [place]

## 2024-09-24 ENCOUNTER — OFFICE VISIT (OUTPATIENT)
Dept: PHYSICAL MEDICINE AND REHAB | Age: 89
End: 2024-09-24
Payer: MEDICARE

## 2024-09-24 VITALS
WEIGHT: 165 LBS | DIASTOLIC BLOOD PRESSURE: 56 MMHG | BODY MASS INDEX: 25.01 KG/M2 | HEIGHT: 68 IN | HEART RATE: 64 BPM | SYSTOLIC BLOOD PRESSURE: 108 MMHG

## 2024-09-24 DIAGNOSIS — M54.17 LUMBOSACRAL RADICULITIS: ICD-10-CM

## 2024-09-24 DIAGNOSIS — M53.3 SACROILIAC JOINT DYSFUNCTION: ICD-10-CM

## 2024-09-24 DIAGNOSIS — G89.4 CHRONIC PAIN SYNDROME: Primary | ICD-10-CM

## 2024-09-24 DIAGNOSIS — M47.816 LUMBAR SPONDYLOSIS: ICD-10-CM

## 2024-09-24 DIAGNOSIS — M47.812 CERVICAL SPONDYLOSIS: ICD-10-CM

## 2024-09-24 DIAGNOSIS — G62.9 NEUROPATHY: ICD-10-CM

## 2024-09-24 PROCEDURE — G8419 CALC BMI OUT NRM PARAM NOF/U: HCPCS | Performed by: NURSE PRACTITIONER

## 2024-09-24 PROCEDURE — 1036F TOBACCO NON-USER: CPT | Performed by: NURSE PRACTITIONER

## 2024-09-24 PROCEDURE — G8427 DOCREV CUR MEDS BY ELIG CLIN: HCPCS | Performed by: NURSE PRACTITIONER

## 2024-09-24 PROCEDURE — 1123F ACP DISCUSS/DSCN MKR DOCD: CPT | Performed by: NURSE PRACTITIONER

## 2024-09-24 PROCEDURE — 99214 OFFICE O/P EST MOD 30 MIN: CPT | Performed by: NURSE PRACTITIONER

## 2024-09-24 RX ORDER — TRAMADOL HYDROCHLORIDE 50 MG/1
50 TABLET ORAL 2 TIMES DAILY PRN
Qty: 60 TABLET | Refills: 0 | Status: SHIPPED | OUTPATIENT
Start: 2024-09-26 | End: 2024-10-26

## 2024-09-24 RX ORDER — BUPRENORPHINE 7.5 UG/H
1 PATCH TRANSDERMAL WEEKLY
Qty: 4 PATCH | Refills: 0 | Status: SHIPPED | OUTPATIENT
Start: 2024-09-30 | End: 2024-10-28

## 2024-09-24 ASSESSMENT — ENCOUNTER SYMPTOMS
COUGH: 0
SINUS PRESSURE: 0
DIARRHEA: 0
ABDOMINAL PAIN: 0
CONSTIPATION: 0
SORE THROAT: 0
VOMITING: 0
SHORTNESS OF BREATH: 0
CHEST TIGHTNESS: 0
RHINORRHEA: 0
WHEEZING: 0
PHOTOPHOBIA: 0
BACK PAIN: 1
NAUSEA: 0
COLOR CHANGE: 0
EYE PAIN: 0

## 2024-10-30 DIAGNOSIS — G89.4 CHRONIC PAIN SYNDROME: ICD-10-CM

## 2024-10-30 NOTE — TELEPHONE ENCOUNTER
OARRS reviewed. UDS: + for  tramadol buprenorphine consistent.   Last seen: 9/24/2024. Follow-up:   Future Appointments   Date Time Provider Department Center   12/3/2024 11:00 AM Fabby Briones APRN - CNP N [facility] Pain [facility] [place]

## 2024-10-31 RX ORDER — BUPRENORPHINE 7.5 UG/H
1 PATCH TRANSDERMAL WEEKLY
Qty: 4 PATCH | Refills: 0 | Status: SHIPPED | OUTPATIENT
Start: 2024-10-31 | End: 2024-11-28

## (undated) DEVICE — GLOVE ORANGE PI 7 1/2   MSG9075

## (undated) DEVICE — 3 ML SYRINGE LUER-LOCK TIP: Brand: MONOJECT

## (undated) DEVICE — GLOVE SURG SZ 65 THK91MIL LTX FREE SYN POLYISOPRENE

## (undated) DEVICE — BANDAGE ADH W1XL3IN NAT FAB WVN FLX DURABLE N ADH PD SEAL

## (undated) DEVICE — TOWEL,OR,DSP,ST,BLUE,DLX,4/PK,20PK/CS: Brand: MEDLINE

## (undated) DEVICE — Device

## (undated) DEVICE — 6 ML SYRINGE LUER-LOCK TIP: Brand: MONOJECT